# Patient Record
Sex: MALE | Race: WHITE | NOT HISPANIC OR LATINO | URBAN - METROPOLITAN AREA
[De-identification: names, ages, dates, MRNs, and addresses within clinical notes are randomized per-mention and may not be internally consistent; named-entity substitution may affect disease eponyms.]

---

## 2020-04-10 ENCOUNTER — INPATIENT (INPATIENT)
Facility: HOSPITAL | Age: 85
LOS: 9 days | Discharge: NURSING, REHAB OR LONG-TERM CARE | DRG: 871 | End: 2020-04-20
Attending: INTERNAL MEDICINE | Admitting: INTERNAL MEDICINE
Payer: MEDICARE

## 2020-04-10 VITALS
RESPIRATION RATE: 26 BRPM | OXYGEN SATURATION: 97 % | DIASTOLIC BLOOD PRESSURE: 71 MMHG | HEART RATE: 107 BPM | SYSTOLIC BLOOD PRESSURE: 137 MMHG | WEIGHT: 175.05 LBS

## 2020-04-10 DIAGNOSIS — N39.0 URINARY TRACT INFECTION, SITE NOT SPECIFIED: ICD-10-CM

## 2020-04-10 LAB
ALBUMIN SERPL ELPH-MCNC: 3.4 G/DL — LOW (ref 3.5–5)
ALP SERPL-CCNC: 68 U/L — SIGNIFICANT CHANGE UP (ref 40–120)
ALT FLD-CCNC: 18 U/L DA — SIGNIFICANT CHANGE UP (ref 10–60)
ANION GAP SERPL CALC-SCNC: 11 MMOL/L — SIGNIFICANT CHANGE UP (ref 5–17)
APPEARANCE UR: ABNORMAL
APTT BLD: 37.8 SEC — HIGH (ref 27.5–36.3)
AST SERPL-CCNC: 27 U/L — SIGNIFICANT CHANGE UP (ref 10–40)
BACTERIA # UR AUTO: ABNORMAL /HPF
BASE EXCESS BLDV CALC-SCNC: -1.6 MMOL/L — SIGNIFICANT CHANGE UP (ref -2–2)
BASOPHILS # BLD AUTO: 0.02 K/UL — SIGNIFICANT CHANGE UP (ref 0–0.2)
BASOPHILS NFR BLD AUTO: 0.1 % — SIGNIFICANT CHANGE UP (ref 0–2)
BILIRUB SERPL-MCNC: 1.3 MG/DL — HIGH (ref 0.2–1.2)
BILIRUB UR-MCNC: NEGATIVE — SIGNIFICANT CHANGE UP
BUN SERPL-MCNC: 20 MG/DL — HIGH (ref 7–18)
CALCIUM SERPL-MCNC: 9 MG/DL — SIGNIFICANT CHANGE UP (ref 8.4–10.5)
CHLORIDE SERPL-SCNC: 103 MMOL/L — SIGNIFICANT CHANGE UP (ref 96–108)
CO2 SERPL-SCNC: 22 MMOL/L — SIGNIFICANT CHANGE UP (ref 22–31)
COLOR SPEC: YELLOW — SIGNIFICANT CHANGE UP
CREAT SERPL-MCNC: 1.35 MG/DL — HIGH (ref 0.5–1.3)
D DIMER BLD IA.RAPID-MCNC: 1394 NG/ML DDU — HIGH
DIFF PNL FLD: ABNORMAL
EOSINOPHIL # BLD AUTO: 0.01 K/UL — SIGNIFICANT CHANGE UP (ref 0–0.5)
EOSINOPHIL NFR BLD AUTO: 0.1 % — SIGNIFICANT CHANGE UP (ref 0–6)
EPI CELLS # UR: SIGNIFICANT CHANGE UP /HPF
GLUCOSE SERPL-MCNC: 135 MG/DL — HIGH (ref 70–99)
GLUCOSE UR QL: NEGATIVE — SIGNIFICANT CHANGE UP
HCO3 BLDV-SCNC: 22 MMOL/L — SIGNIFICANT CHANGE UP (ref 21–29)
HCT VFR BLD CALC: 39.8 % — SIGNIFICANT CHANGE UP (ref 39–50)
HGB BLD-MCNC: 13.1 G/DL — SIGNIFICANT CHANGE UP (ref 13–17)
HOROWITZ INDEX BLDV+IHG-RTO: 21 — SIGNIFICANT CHANGE UP
IMM GRANULOCYTES NFR BLD AUTO: 0.4 % — SIGNIFICANT CHANGE UP (ref 0–1.5)
INR BLD: 1.4 RATIO — HIGH (ref 0.88–1.16)
KETONES UR-MCNC: ABNORMAL
LACTATE SERPL-SCNC: 1.1 MMOL/L — SIGNIFICANT CHANGE UP (ref 0.7–2)
LDH SERPL L TO P-CCNC: 229 U/L — HIGH (ref 120–225)
LEUKOCYTE ESTERASE UR-ACNC: ABNORMAL
LYMPHOCYTES # BLD AUTO: 0.5 K/UL — LOW (ref 1–3.3)
LYMPHOCYTES # BLD AUTO: 2.7 % — LOW (ref 13–44)
MCHC RBC-ENTMCNC: 28.4 PG — SIGNIFICANT CHANGE UP (ref 27–34)
MCHC RBC-ENTMCNC: 32.9 GM/DL — SIGNIFICANT CHANGE UP (ref 32–36)
MCV RBC AUTO: 86.3 FL — SIGNIFICANT CHANGE UP (ref 80–100)
MONOCYTES # BLD AUTO: 1.17 K/UL — HIGH (ref 0–0.9)
MONOCYTES NFR BLD AUTO: 6.4 % — SIGNIFICANT CHANGE UP (ref 2–14)
NEUTROPHILS # BLD AUTO: 16.5 K/UL — HIGH (ref 1.8–7.4)
NEUTROPHILS NFR BLD AUTO: 90.3 % — HIGH (ref 43–77)
NITRITE UR-MCNC: POSITIVE
NRBC # BLD: 0 /100 WBCS — SIGNIFICANT CHANGE UP (ref 0–0)
NT-PROBNP SERPL-SCNC: 557 PG/ML — HIGH (ref 0–450)
PCO2 BLDV: 36 MMHG — SIGNIFICANT CHANGE UP (ref 35–50)
PH BLDV: 7.4 — SIGNIFICANT CHANGE UP (ref 7.35–7.45)
PH UR: 5 — SIGNIFICANT CHANGE UP (ref 5–8)
PLATELET # BLD AUTO: 200 K/UL — SIGNIFICANT CHANGE UP (ref 150–400)
PO2 BLDV: 31 MMHG — SIGNIFICANT CHANGE UP (ref 25–45)
POTASSIUM SERPL-MCNC: 3.2 MMOL/L — LOW (ref 3.5–5.3)
POTASSIUM SERPL-SCNC: 3.2 MMOL/L — LOW (ref 3.5–5.3)
PROT SERPL-MCNC: 8.1 G/DL — SIGNIFICANT CHANGE UP (ref 6–8.3)
PROT UR-MCNC: 100
PROTHROM AB SERPL-ACNC: 16 SEC — HIGH (ref 10–12.9)
RBC # BLD: 4.61 M/UL — SIGNIFICANT CHANGE UP (ref 4.2–5.8)
RBC # FLD: 14.5 % — SIGNIFICANT CHANGE UP (ref 10.3–14.5)
RBC CASTS # UR COMP ASSIST: ABNORMAL /HPF (ref 0–2)
SAO2 % BLDV: 98 % — HIGH (ref 67–88)
SODIUM SERPL-SCNC: 136 MMOL/L — SIGNIFICANT CHANGE UP (ref 135–145)
SP GR SPEC: 1.01 — SIGNIFICANT CHANGE UP (ref 1.01–1.02)
TROPONIN I SERPL-MCNC: 0.03 NG/ML — SIGNIFICANT CHANGE UP (ref 0–0.04)
UROBILINOGEN FLD QL: 1
WBC # BLD: 18.28 K/UL — HIGH (ref 3.8–10.5)
WBC # FLD AUTO: 18.28 K/UL — HIGH (ref 3.8–10.5)
WBC UR QL: >50 /HPF (ref 0–5)

## 2020-04-10 PROCEDURE — 74177 CT ABD & PELVIS W/CONTRAST: CPT | Mod: 26

## 2020-04-10 PROCEDURE — 99285 EMERGENCY DEPT VISIT HI MDM: CPT | Mod: CS

## 2020-04-10 PROCEDURE — 71045 X-RAY EXAM CHEST 1 VIEW: CPT | Mod: 26

## 2020-04-10 RX ORDER — CEFTRIAXONE 500 MG/1
1000 INJECTION, POWDER, FOR SOLUTION INTRAMUSCULAR; INTRAVENOUS ONCE
Refills: 0 | Status: COMPLETED | OUTPATIENT
Start: 2020-04-10 | End: 2020-04-10

## 2020-04-10 RX ORDER — SODIUM CHLORIDE 9 MG/ML
500 INJECTION INTRAMUSCULAR; INTRAVENOUS; SUBCUTANEOUS ONCE
Refills: 0 | Status: COMPLETED | OUTPATIENT
Start: 2020-04-10 | End: 2020-04-10

## 2020-04-10 RX ADMIN — CEFTRIAXONE 1000 MILLIGRAM(S): 500 INJECTION, POWDER, FOR SOLUTION INTRAMUSCULAR; INTRAVENOUS at 21:58

## 2020-04-10 RX ADMIN — CEFTRIAXONE 100 MILLIGRAM(S): 500 INJECTION, POWDER, FOR SOLUTION INTRAMUSCULAR; INTRAVENOUS at 20:58

## 2020-04-10 RX ADMIN — SODIUM CHLORIDE 1000 MILLILITER(S): 9 INJECTION INTRAMUSCULAR; INTRAVENOUS; SUBCUTANEOUS at 16:58

## 2020-04-10 NOTE — ED ADULT NURSE REASSESSMENT NOTE - NS ED NURSE REASSESS COMMENT FT1
Received pt from [facility]ERA Vincent, pt is observed laying in bed, breathing room air, in no respiratory distress at time of assessment. Pt is &AO x1-2, able to make needs known when asked, denies any distress/discomfort at this time except that he requests for water. Pt not observed ambulating at this time, meds administered and STAT labs sent as ordered. Skin intact, right wrist #[id number] in place.  Admitted to [facility], awaiting bed, endorsed to GUERA Nolasco for holding. No family at bedside at this time.

## 2020-04-10 NOTE — ED ADULT NURSE NOTE - NSIMPLEMENTINTERV_GEN_ALL_ED
Implemented All Universal Safety Interventions:  Hunt to call system. Call bell, personal items and telephone within reach. Instruct patient to call for assistance. Room bathroom lighting operational. Non-slip footwear when patient is off stretcher. Physically safe environment: no spills, clutter or unnecessary equipment. Stretcher in lowest position, wheels locked, appropriate side rails in place.

## 2020-04-10 NOTE — ED PROVIDER NOTE - PHYSICAL EXAMINATION
General: Patient awake alert NAD.   HEENT: normocephalic, atraumatic, EOMI, + dry MM   Cardiac: RRR, S1, S2, no murmur.   LUNGS: + crackles in b/l bases, + diffuse diminished breath sounds, no wheeze, rhonchi, + speaking in short phrases, + tachypnea.     Abdomen: soft + reducible umbilical hernia, +RUQ and periumbilical, no rebound no guarding.   EXT: Moving all extremities, no edema.   Neuro: A&Ox1, no focal neurological deficits, CN 2-12 grossly intact  Skin: warm, dry, no rash.

## 2020-04-10 NOTE — ED PROVIDER NOTE - CLINICAL SUMMARY MEDICAL DECISION MAKING FREE TEXT BOX
Elderly male with unknown PMHx, pw sob congestion and abd pain. Possible COVID PNA or intraabdominal pathology. Low threshold for abd CT given prior surgical history. COVID labs, CXR, CT abd and pelvis. Likely require admission for difficulty breathing.

## 2020-04-10 NOTE — ED PROVIDER NOTE - CARE PLAN
Principal Discharge DX:	Shortness of breath Principal Discharge DX:	UTI (urinary tract infection)  Secondary Diagnosis:	Shortness of breath

## 2020-04-10 NOTE — ED PROVIDER NOTE - ATTENDING CONTRIBUTION TO CARE
Attending MD Mendez.  Agree with above.  Pt sent reportedly for SOB/cough however disoriented to time/place and situation.  Pt is sating 96% ORA in ED.  NRB had been started on arrival or by EMS (unclear) however when O2 removed pt is maintaining sats without assistance.  Pt has diffuse abdominal TTP R>L.  Has umbilical hernia noted but this is reducible and minimally tender, no surrounding discoloration.  Extremities are cool and non-edematous.  Plan to eval for COVID and attempt to get collateral information.  Initially paperwork from whereever he came/EMS not able to be found.  Will continue to check in ED.

## 2020-04-10 NOTE — ED PROVIDER NOTE - OBJECTIVE STATEMENT
85 yo M, AOx2, poor historian, unable to recall his pmhx, meds, or a family member name or phone number, presents with congestion and SOB. Pt was on 15 L NRB in triage 98%, 95% on RA, increased WOB speaking in few words. Mild abd tenderness in RUQ and periumbilical on exam. No prior visits in chart. 87 yo M, AOx2, poor historian, unable to recall his pmhx, meds, or a family member name or phone number, presents with congestion and SOB. Pt was on 15 L NRB in triage 98%, 95% on RA, increased WOB speaking in few words. Mild abd tenderness in RUQ and periumbilical on exam. No prior visits in chart.    PMHX from transfer paperwork: Hypothyroid, BPH, Divertitifulitis, HLD, HTN, Glaucoma, Porastate CA,

## 2020-04-10 NOTE — ED PROVIDER NOTE - PROGRESS NOTE DETAILS
Cammie Salmon M.D. Resident  Pt off NRB, on RA, SPO2 96% with increased WOB Cammie Salmon M.D. Resident  Spoke to  Emmy from [facility] [place] patient was more confused today (usually able to answer questions, and knows what year it is). called health care proxy, will Serrano,  x 3 no answer. Attending MD Mendez.  Pt endorsed to Dr. Aiden Mejia for UTI +/- COVID. Ceftriaxone administered for UTI.  Pt not requiring O2 at this time.  Sating 96-97% ORA without resp distress.  Of note d-dimer obtained as part of COVID screening and was not obtained for concern for PE.  No CTA ordered or planned at this time.

## 2020-04-11 DIAGNOSIS — R06.02 SHORTNESS OF BREATH: ICD-10-CM

## 2020-04-11 DIAGNOSIS — N17.9 ACUTE KIDNEY FAILURE, UNSPECIFIED: ICD-10-CM

## 2020-04-11 DIAGNOSIS — N30.00 ACUTE CYSTITIS WITHOUT HEMATURIA: ICD-10-CM

## 2020-04-11 DIAGNOSIS — E03.9 HYPOTHYROIDISM, UNSPECIFIED: ICD-10-CM

## 2020-04-11 DIAGNOSIS — N40.0 BENIGN PROSTATIC HYPERPLASIA WITHOUT LOWER URINARY TRACT SYMPTOMS: ICD-10-CM

## 2020-04-11 DIAGNOSIS — Z29.9 ENCOUNTER FOR PROPHYLACTIC MEASURES, UNSPECIFIED: ICD-10-CM

## 2020-04-11 LAB
CRP SERPL-MCNC: 6.81 MG/DL — HIGH (ref 0–0.4)
E COLI DNA BLD POS QL NAA+NON-PROBE: SIGNIFICANT CHANGE UP
FERRITIN SERPL-MCNC: 523 NG/ML — HIGH (ref 30–400)
GRAM STN FLD: SIGNIFICANT CHANGE UP
GRAM STN FLD: SIGNIFICANT CHANGE UP
METHOD TYPE: SIGNIFICANT CHANGE UP
SARS-COV-2 RNA SPEC QL NAA+PROBE: DETECTED
SPECIMEN SOURCE: SIGNIFICANT CHANGE UP
SPECIMEN SOURCE: SIGNIFICANT CHANGE UP

## 2020-04-11 RX ORDER — ACETAMINOPHEN 500 MG
1000 TABLET ORAL ONCE
Refills: 0 | Status: DISCONTINUED | OUTPATIENT
Start: 2020-04-11 | End: 2020-04-11

## 2020-04-11 RX ORDER — LEVOTHYROXINE SODIUM 125 MCG
25 TABLET ORAL DAILY
Refills: 0 | Status: DISCONTINUED | OUTPATIENT
Start: 2020-04-11 | End: 2020-04-20

## 2020-04-11 RX ORDER — DEXTROSE MONOHYDRATE, SODIUM CHLORIDE, AND POTASSIUM CHLORIDE 50; .745; 4.5 G/1000ML; G/1000ML; G/1000ML
1000 INJECTION, SOLUTION INTRAVENOUS
Refills: 0 | Status: DISCONTINUED | OUTPATIENT
Start: 2020-04-11 | End: 2020-04-11

## 2020-04-11 RX ORDER — FINASTERIDE 5 MG/1
5 TABLET, FILM COATED ORAL DAILY
Refills: 0 | Status: DISCONTINUED | OUTPATIENT
Start: 2020-04-11 | End: 2020-04-20

## 2020-04-11 RX ORDER — CEFTRIAXONE 500 MG/1
1 INJECTION, POWDER, FOR SOLUTION INTRAMUSCULAR; INTRAVENOUS
Qty: 0 | Refills: 0 | DISCHARGE
Start: 2020-04-11

## 2020-04-11 RX ORDER — CEFTRIAXONE 500 MG/1
1000 INJECTION, POWDER, FOR SOLUTION INTRAMUSCULAR; INTRAVENOUS EVERY 24 HOURS
Refills: 0 | Status: DISCONTINUED | OUTPATIENT
Start: 2020-04-11 | End: 2020-04-12

## 2020-04-11 RX ORDER — POTASSIUM CHLORIDE 20 MEQ
40 PACKET (EA) ORAL ONCE
Refills: 0 | Status: COMPLETED | OUTPATIENT
Start: 2020-04-11 | End: 2020-04-11

## 2020-04-11 RX ORDER — PANTOPRAZOLE SODIUM 20 MG/1
40 TABLET, DELAYED RELEASE ORAL
Refills: 0 | Status: DISCONTINUED | OUTPATIENT
Start: 2020-04-11 | End: 2020-04-20

## 2020-04-11 RX ORDER — TAMSULOSIN HYDROCHLORIDE 0.4 MG/1
0.4 CAPSULE ORAL AT BEDTIME
Refills: 0 | Status: DISCONTINUED | OUTPATIENT
Start: 2020-04-11 | End: 2020-04-20

## 2020-04-11 RX ORDER — ACETAMINOPHEN 500 MG
650 TABLET ORAL EVERY 6 HOURS
Refills: 0 | Status: DISCONTINUED | OUTPATIENT
Start: 2020-04-11 | End: 2020-04-20

## 2020-04-11 RX ORDER — LORATADINE 10 MG/1
10 TABLET ORAL DAILY
Refills: 0 | Status: DISCONTINUED | OUTPATIENT
Start: 2020-04-11 | End: 2020-04-20

## 2020-04-11 RX ORDER — MONTELUKAST 4 MG/1
10 TABLET, CHEWABLE ORAL DAILY
Refills: 0 | Status: DISCONTINUED | OUTPATIENT
Start: 2020-04-11 | End: 2020-04-20

## 2020-04-11 RX ORDER — SIMVASTATIN 20 MG/1
20 TABLET, FILM COATED ORAL AT BEDTIME
Refills: 0 | Status: DISCONTINUED | OUTPATIENT
Start: 2020-04-11 | End: 2020-04-20

## 2020-04-11 RX ORDER — SODIUM CHLORIDE 9 MG/ML
1000 INJECTION INTRAMUSCULAR; INTRAVENOUS; SUBCUTANEOUS
Refills: 0 | Status: DISCONTINUED | OUTPATIENT
Start: 2020-04-11 | End: 2020-04-20

## 2020-04-11 RX ORDER — ENOXAPARIN SODIUM 100 MG/ML
40 INJECTION SUBCUTANEOUS DAILY
Refills: 0 | Status: DISCONTINUED | OUTPATIENT
Start: 2020-04-11 | End: 2020-04-20

## 2020-04-11 RX ADMIN — CEFTRIAXONE 100 MILLIGRAM(S): 500 INJECTION, POWDER, FOR SOLUTION INTRAMUSCULAR; INTRAVENOUS at 12:02

## 2020-04-11 RX ADMIN — DEXTROSE MONOHYDRATE, SODIUM CHLORIDE, AND POTASSIUM CHLORIDE 50 MILLILITER(S): 50; .745; 4.5 INJECTION, SOLUTION INTRAVENOUS at 12:03

## 2020-04-11 RX ADMIN — SODIUM CHLORIDE 75 MILLILITER(S): 9 INJECTION INTRAMUSCULAR; INTRAVENOUS; SUBCUTANEOUS at 15:50

## 2020-04-11 RX ADMIN — SIMVASTATIN 20 MILLIGRAM(S): 20 TABLET, FILM COATED ORAL at 21:19

## 2020-04-11 RX ADMIN — MONTELUKAST 10 MILLIGRAM(S): 4 TABLET, CHEWABLE ORAL at 12:02

## 2020-04-11 RX ADMIN — FINASTERIDE 5 MILLIGRAM(S): 5 TABLET, FILM COATED ORAL at 12:02

## 2020-04-11 RX ADMIN — ENOXAPARIN SODIUM 40 MILLIGRAM(S): 100 INJECTION SUBCUTANEOUS at 12:02

## 2020-04-11 RX ADMIN — Medication 650 MILLIGRAM(S): at 16:21

## 2020-04-11 RX ADMIN — LORATADINE 10 MILLIGRAM(S): 10 TABLET ORAL at 12:02

## 2020-04-11 RX ADMIN — Medication 40 MILLIEQUIVALENT(S): at 12:02

## 2020-04-11 RX ADMIN — TAMSULOSIN HYDROCHLORIDE 0.4 MILLIGRAM(S): 0.4 CAPSULE ORAL at 21:19

## 2020-04-11 NOTE — ACUTE INTERFACILITY TRANSFER NOTE - HOSPITAL COURSE
85 yo M, AOx2, poor historian, unable to recall his pmhx, meds, or a family member name or phone number, presents with congestion and SOB. Pt was on 15 L NRB in triage 98%, 95% on RA, increased WOB speaking in few words. Mild abd tenderness in RUQ and periumbilical on exam. No prior visits in chart.    Urinaylsis shows UTI  on rocephin IV  r/o COVID     blood cultures prelim showed gram variable rods  c/w Rocephin IV for now as per med attding Dr Aiden Mejia

## 2020-04-11 NOTE — H&P ADULT - PROBLEM SELECTOR PLAN 2
was sent from NH due to Shortness of breath  Pt. denies any shortness of breath  - Respiratory status- Not in distress, S02 97% on RA on my evaluation  - T- afebrile  - WBC: 18, lymphopenia 0.5  - CXR - Minimal left midlung reticular opacities secondary to discoid atelectasis or viral pneumonia.  - Ed course; Rocephin, IV NS 500ml.   - Will rule out covid 19 with contact and airborne isolation precaution   - FU CRP, ferritin   - Blood culture , COVID no

## 2020-04-11 NOTE — H&P ADULT - ASSESSMENT
86M from [facility], PMH of BPH, hemorrhoids, HLD, HTN, osteoarthritis, GERD was sent from NH for shortness of breath as per NH paper. Pt. is AAO X1 and thinks he is in [facility]. He states he does not know why he was sent to hospital. Denies any pain, fever, chills, flu like symptoms, urinary or bowel complaints. 86M from [facility], PMH of BPH, hemorrhoids, HLD, Hypothyroid, osteoarthritis, GERD was sent from NH for shortness of breath as per NH paper. Pt. is AAO X1 and thinks he is in [facility]. He states he does not know why he was sent to hospital. Denies any pain, fever, chills, flu like symptoms, urinary or bowel complaints. 86M from [facility], PMH of BPH, hemorrhoids, HLD, Hypothyroid, osteoarthritis, GERD was sent from NH for shortness of breath as per NH paper. Pt. is AAO X1 and thinks he is in [facility]. He states he does not know why he was sent to hospital. Denies any pain, fever, chills, flu like symptoms, urinary or bowel complaints.      GOC: Full code as per NH paper, unable to reach family

## 2020-04-11 NOTE — CHART NOTE - NSCHARTNOTEFT_GEN_A_CORE
medical addendum:    critical value returned  bld cx show gram variable rods    pt on rocephin iv- c/w with this abx as per dr beavers

## 2020-04-11 NOTE — H&P ADULT - HISTORY OF PRESENT ILLNESS
86M from [facility], PMH of BPH, hemorrhoids, HLD, HTN, osteoarthritis, GERD was sent from NH for shortness of breath as per NH paper. Pt. is AAO X1 and thinks he is in [facility]. He states he does not know why he was sent to hospital. Denies any pain, fever, chills, flu like symptoms, urinary or bowel complaints. 86M from [facility], PMH of BPH, hemorrhoids, HLD, Hypothyroid, osteoarthritis, GERD was sent from NH for shortness of breath as per NH paper. Pt. is AAO X1 and thinks he is in [facility]. He states he does not know why he was sent to hospital. Denies any pain, fever, chills, flu like symptoms, urinary or bowel complaints.

## 2020-04-11 NOTE — ACUTE INTERFACILITY TRANSFER NOTE - NS MD INTERFACILITY TRANSFER INST FT
85 yo M, AOx2, poor historian, unable to recall his pmhx, meds, or a family member name or phone number, presents with congestion and SOB. Pt was on 15 L NRB in triage 98%, 95% on RA, increased WOB speaking in few words. Mild abd tenderness in RUQ and periumbilical on exam. No prior visits in chart.    May transfer with 3L oxygen via nc during transport. R/o covid admission   being treated for UTI with rocephin iv

## 2020-04-11 NOTE — ACUTE INTERFACILITY TRANSFER NOTE - OTHER SIGNIFICANT FINDINGS
IMPRESSION:   Severe bladder wall thickening concerning for cystitis. Enlarged prostate compressing bladder base. No hydronephrosis. No renal calculus.  Appendix normal.

## 2020-04-11 NOTE — H&P ADULT - NSHPPHYSICALEXAM_GEN_ALL_CORE
Vital Signs Last 24 Hrs  T(C): 37.4 (11 Apr 2020 08:47), Max: 37.4 (10 Apr 2020 21:46)  T(F): 99.4 (11 Apr 2020 08:47), Max: 99.4 (10 Apr 2020 21:46)  HR: 113 (11 Apr 2020 08:47) (97 - 124)  BP: 165/77 (11 Apr 2020 08:47) (135/81 - 165/77)  BP(mean): --  RR: 22 (11 Apr 2020 08:47) (20 - 26)  SpO2: 97% (11 Apr 2020 13:37) (96% - 100%)    PHYSICAL EXAM:  GENERAL: NAD  HEENT: Normocephalic;  conjunctivae and sclerae clear; moist mucous membranes;   NECK : supple  CHEST/LUNG: Clear to auscultation bilaterally with good air entry   HEART: S1 S2  regular; no murmurs, gallops or rubs  ABDOMEN: Soft, Nontender, Nondistended; Bowel sounds present  EXTREMITIES: no cyanosis; no edema; no calf tenderness  SKIN: warm and dry; no rash  NERVOUS SYSTEM:  Awake and alert; no new deficits

## 2020-04-11 NOTE — ACUTE INTERFACILITY TRANSFER NOTE - CARE PROVIDER_API CALL
Aiden Mejia)  Cardiology  [address] [id number]  Phone: [phone]  Fax: [phone]  Follow Up Time:

## 2020-04-12 LAB
ALBUMIN SERPL ELPH-MCNC: 3 G/DL — LOW (ref 3.5–5)
ALP SERPL-CCNC: 69 U/L — SIGNIFICANT CHANGE UP (ref 40–120)
ALT FLD-CCNC: 34 U/L DA — SIGNIFICANT CHANGE UP (ref 10–60)
ANION GAP SERPL CALC-SCNC: 6 MMOL/L — SIGNIFICANT CHANGE UP (ref 5–17)
AST SERPL-CCNC: 80 U/L — HIGH (ref 10–40)
BASOPHILS # BLD AUTO: 0.02 K/UL — SIGNIFICANT CHANGE UP (ref 0–0.2)
BASOPHILS NFR BLD AUTO: 0.1 % — SIGNIFICANT CHANGE UP (ref 0–2)
BILIRUB SERPL-MCNC: 0.9 MG/DL — SIGNIFICANT CHANGE UP (ref 0.2–1.2)
BUN SERPL-MCNC: 22 MG/DL — HIGH (ref 7–18)
CALCIUM SERPL-MCNC: 8.7 MG/DL — SIGNIFICANT CHANGE UP (ref 8.4–10.5)
CHLORIDE SERPL-SCNC: 104 MMOL/L — SIGNIFICANT CHANGE UP (ref 96–108)
CHOLEST SERPL-MCNC: 90 MG/DL — SIGNIFICANT CHANGE UP (ref 10–199)
CO2 SERPL-SCNC: 24 MMOL/L — SIGNIFICANT CHANGE UP (ref 22–31)
CREAT SERPL-MCNC: 1.2 MG/DL — SIGNIFICANT CHANGE UP (ref 0.5–1.3)
EOSINOPHIL # BLD AUTO: 0 K/UL — SIGNIFICANT CHANGE UP (ref 0–0.5)
EOSINOPHIL NFR BLD AUTO: 0 % — SIGNIFICANT CHANGE UP (ref 0–6)
FERRITIN SERPL-MCNC: 591 NG/ML — HIGH (ref 30–400)
GLUCOSE SERPL-MCNC: 95 MG/DL — SIGNIFICANT CHANGE UP (ref 70–99)
HBA1C BLD-MCNC: 6.2 % — HIGH (ref 4–5.6)
HCT VFR BLD CALC: 40.6 % — SIGNIFICANT CHANGE UP (ref 39–50)
HDLC SERPL-MCNC: 28 MG/DL — LOW
HGB BLD-MCNC: 13 G/DL — SIGNIFICANT CHANGE UP (ref 13–17)
IMM GRANULOCYTES NFR BLD AUTO: 0.7 % — SIGNIFICANT CHANGE UP (ref 0–1.5)
LDH SERPL L TO P-CCNC: 274 U/L — HIGH (ref 120–225)
LIPID PNL WITH DIRECT LDL SERPL: 45 MG/DL — SIGNIFICANT CHANGE UP
LYMPHOCYTES # BLD AUTO: 0.56 K/UL — LOW (ref 1–3.3)
LYMPHOCYTES # BLD AUTO: 4.1 % — LOW (ref 13–44)
MAGNESIUM SERPL-MCNC: 2.1 MG/DL — SIGNIFICANT CHANGE UP (ref 1.6–2.6)
MCHC RBC-ENTMCNC: 28.3 PG — SIGNIFICANT CHANGE UP (ref 27–34)
MCHC RBC-ENTMCNC: 32 GM/DL — SIGNIFICANT CHANGE UP (ref 32–36)
MCV RBC AUTO: 88.3 FL — SIGNIFICANT CHANGE UP (ref 80–100)
MONOCYTES # BLD AUTO: 0.99 K/UL — HIGH (ref 0–0.9)
MONOCYTES NFR BLD AUTO: 7.3 % — SIGNIFICANT CHANGE UP (ref 2–14)
NEUTROPHILS # BLD AUTO: 11.84 K/UL — HIGH (ref 1.8–7.4)
NEUTROPHILS NFR BLD AUTO: 87.8 % — HIGH (ref 43–77)
NRBC # BLD: 0 /100 WBCS — SIGNIFICANT CHANGE UP (ref 0–0)
PHOSPHATE SERPL-MCNC: 2.4 MG/DL — LOW (ref 2.5–4.5)
PLATELET # BLD AUTO: 215 K/UL — SIGNIFICANT CHANGE UP (ref 150–400)
POTASSIUM SERPL-MCNC: 3.8 MMOL/L — SIGNIFICANT CHANGE UP (ref 3.5–5.3)
POTASSIUM SERPL-SCNC: 3.8 MMOL/L — SIGNIFICANT CHANGE UP (ref 3.5–5.3)
PROT SERPL-MCNC: 7.7 G/DL — SIGNIFICANT CHANGE UP (ref 6–8.3)
RBC # BLD: 4.6 M/UL — SIGNIFICANT CHANGE UP (ref 4.2–5.8)
RBC # FLD: 14.7 % — HIGH (ref 10.3–14.5)
SODIUM SERPL-SCNC: 134 MMOL/L — LOW (ref 135–145)
TOTAL CHOLESTEROL/HDL RATIO MEASUREMENT: 3.2 RATIO — LOW (ref 3.4–9.6)
TRIGL SERPL-MCNC: 83 MG/DL — SIGNIFICANT CHANGE UP (ref 10–149)
TSH SERPL-MCNC: 2.44 UU/ML — SIGNIFICANT CHANGE UP (ref 0.34–4.82)
VIT B12 SERPL-MCNC: 319 PG/ML — SIGNIFICANT CHANGE UP (ref 232–1245)
WBC # BLD: 13.51 K/UL — HIGH (ref 3.8–10.5)
WBC # FLD AUTO: 13.51 K/UL — HIGH (ref 3.8–10.5)

## 2020-04-12 RX ORDER — PIPERACILLIN AND TAZOBACTAM 4; .5 G/20ML; G/20ML
3.38 INJECTION, POWDER, LYOPHILIZED, FOR SOLUTION INTRAVENOUS ONCE
Refills: 0 | Status: COMPLETED | OUTPATIENT
Start: 2020-04-12 | End: 2020-04-12

## 2020-04-12 RX ORDER — PIPERACILLIN AND TAZOBACTAM 4; .5 G/20ML; G/20ML
3.38 INJECTION, POWDER, LYOPHILIZED, FOR SOLUTION INTRAVENOUS EVERY 8 HOURS
Refills: 0 | Status: DISCONTINUED | OUTPATIENT
Start: 2020-04-12 | End: 2020-04-20

## 2020-04-12 RX ADMIN — Medication 25 MICROGRAM(S): at 05:29

## 2020-04-12 RX ADMIN — PIPERACILLIN AND TAZOBACTAM 200 GRAM(S): 4; .5 INJECTION, POWDER, LYOPHILIZED, FOR SOLUTION INTRAVENOUS at 11:43

## 2020-04-12 RX ADMIN — TAMSULOSIN HYDROCHLORIDE 0.4 MILLIGRAM(S): 0.4 CAPSULE ORAL at 21:28

## 2020-04-12 RX ADMIN — LORATADINE 10 MILLIGRAM(S): 10 TABLET ORAL at 11:44

## 2020-04-12 RX ADMIN — PANTOPRAZOLE SODIUM 40 MILLIGRAM(S): 20 TABLET, DELAYED RELEASE ORAL at 05:29

## 2020-04-12 RX ADMIN — PIPERACILLIN AND TAZOBACTAM 25 GRAM(S): 4; .5 INJECTION, POWDER, LYOPHILIZED, FOR SOLUTION INTRAVENOUS at 14:00

## 2020-04-12 RX ADMIN — FINASTERIDE 5 MILLIGRAM(S): 5 TABLET, FILM COATED ORAL at 11:44

## 2020-04-12 RX ADMIN — SIMVASTATIN 20 MILLIGRAM(S): 20 TABLET, FILM COATED ORAL at 21:28

## 2020-04-12 RX ADMIN — MONTELUKAST 10 MILLIGRAM(S): 4 TABLET, CHEWABLE ORAL at 11:44

## 2020-04-12 RX ADMIN — ENOXAPARIN SODIUM 40 MILLIGRAM(S): 100 INJECTION SUBCUTANEOUS at 11:44

## 2020-04-12 RX ADMIN — PIPERACILLIN AND TAZOBACTAM 25 GRAM(S): 4; .5 INJECTION, POWDER, LYOPHILIZED, FOR SOLUTION INTRAVENOUS at 21:28

## 2020-04-12 NOTE — PROGRESS NOTE ADULT - SUBJECTIVE AND OBJECTIVE BOX
PRESENTING CC:Fever    SUBJ:       PMH -reviewed admission note, no change since admission  Heart failure: acute [ ] chronic [ ] acute or chronic [ ] diastolic [ ] systolic [ ] combined systolic and diastolic[ ]  RACHAEL: ATN[ ] renal medullary necrosis [ ] CKD I [ ]CKDII [ ]CKD III [ ]CKD IV [ ]CKD V [ ]Other pathological lesions [ ]    MEDICATIONS  (STANDING):  enoxaparin Injectable 40 milliGRAM(s) SubCutaneous daily  finasteride 5 milliGRAM(s) Oral daily  levothyroxine 25 MICROGram(s) Oral daily  loratadine 10 milliGRAM(s) Oral daily  montelukast 10 milliGRAM(s) Oral daily  pantoprazole    Tablet 40 milliGRAM(s) Oral before breakfast  piperacillin/tazobactam IVPB.. 3.375 Gram(s) IV Intermittent every 8 hours  simvastatin 20 milliGRAM(s) Oral at bedtime  sodium chloride 0.9%. 1000 milliLiter(s) (75 mL/Hr) IV Continuous <Continuous>  tamsulosin 0.4 milliGRAM(s) Oral at bedtime    MEDICATIONS  (PRN):  acetaminophen   Tablet .. 650 milliGRAM(s) Oral every 6 hours PRN Temp greater or equal to 38C (100.4F)                  PHYSICAL EXAM:  General: No acute distress BMI-  HEENT: EOMI, PERRL  Neck: Supple, [ ] JVD  Lungs: Equal air entry bilaterally; [ ] rales [ ] wheezing [ x] rhonchi  Heart: Regular rate and rhythm; [x ] murmur   2/6 [x ] systolic [ ] diastolic [ ] radiation[ ] rubs [ ]  gallops  Abdomen: Nontender, bowel sounds present  Extremities: No clubbing, cyanosis, [ ] edema  Nervous system:  Alert & Oriented X3, no focal deficits  Psychiatric: Normal affect  Skin: No rashes or lesions    LABS:  COVID-19 PCR . (04.10.20 @ 16:31)    COVID-19 PCR: Detected: This test has been validated by [employer] to be accurate;  though it has not been FDA cleared/approved by the usual pathway.  As with all laboratory tests, results should be correlated with clinical  findings.  https://www.fda.gov/media/076789/download  https://www.fda.gov/media/768810/download                   IMPRESSION AND PLAN:      Bacteremia 2/2 UTI-GNR  Start Zosyn

## 2020-04-12 NOTE — PROGRESS NOTE ADULT - ATTENDING COMMENTS
Patient was seen and examined by me on 04/12/2020,interim events noted,labs and radiology studies reviewed.  Aiden Mejia MD,FACC.  [address] [place] [address].  [facility].  [phone]

## 2020-04-13 LAB
-  AMIKACIN: SIGNIFICANT CHANGE UP
-  AMIKACIN: SIGNIFICANT CHANGE UP
-  AMPICILLIN/SULBACTAM: SIGNIFICANT CHANGE UP
-  AMPICILLIN/SULBACTAM: SIGNIFICANT CHANGE UP
-  AMPICILLIN: SIGNIFICANT CHANGE UP
-  AMPICILLIN: SIGNIFICANT CHANGE UP
-  AZTREONAM: SIGNIFICANT CHANGE UP
-  AZTREONAM: SIGNIFICANT CHANGE UP
-  CEFAZOLIN: SIGNIFICANT CHANGE UP
-  CEFAZOLIN: SIGNIFICANT CHANGE UP
-  CEFEPIME: SIGNIFICANT CHANGE UP
-  CEFEPIME: SIGNIFICANT CHANGE UP
-  CEFOXITIN: SIGNIFICANT CHANGE UP
-  CEFOXITIN: SIGNIFICANT CHANGE UP
-  CEFTRIAXONE: SIGNIFICANT CHANGE UP
-  CEFTRIAXONE: SIGNIFICANT CHANGE UP
-  CIPROFLOXACIN: SIGNIFICANT CHANGE UP
-  CIPROFLOXACIN: SIGNIFICANT CHANGE UP
-  ERTAPENEM: SIGNIFICANT CHANGE UP
-  ERTAPENEM: SIGNIFICANT CHANGE UP
-  GENTAMICIN: SIGNIFICANT CHANGE UP
-  GENTAMICIN: SIGNIFICANT CHANGE UP
-  IMIPENEM: SIGNIFICANT CHANGE UP
-  IMIPENEM: SIGNIFICANT CHANGE UP
-  LEVOFLOXACIN: SIGNIFICANT CHANGE UP
-  LEVOFLOXACIN: SIGNIFICANT CHANGE UP
-  MEROPENEM: SIGNIFICANT CHANGE UP
-  MEROPENEM: SIGNIFICANT CHANGE UP
-  NITROFURANTOIN: SIGNIFICANT CHANGE UP
-  PIPERACILLIN/TAZOBACTAM: SIGNIFICANT CHANGE UP
-  PIPERACILLIN/TAZOBACTAM: SIGNIFICANT CHANGE UP
-  TIGECYCLINE: SIGNIFICANT CHANGE UP
-  TOBRAMYCIN: SIGNIFICANT CHANGE UP
-  TOBRAMYCIN: SIGNIFICANT CHANGE UP
-  TRIMETHOPRIM/SULFAMETHOXAZOLE: SIGNIFICANT CHANGE UP
-  TRIMETHOPRIM/SULFAMETHOXAZOLE: SIGNIFICANT CHANGE UP
ALBUMIN SERPL ELPH-MCNC: 2.6 G/DL — LOW (ref 3.5–5)
ALP SERPL-CCNC: 60 U/L — SIGNIFICANT CHANGE UP (ref 40–120)
ALT FLD-CCNC: 36 U/L DA — SIGNIFICANT CHANGE UP (ref 10–60)
ANION GAP SERPL CALC-SCNC: 9 MMOL/L — SIGNIFICANT CHANGE UP (ref 5–17)
AST SERPL-CCNC: 57 U/L — HIGH (ref 10–40)
BASOPHILS # BLD AUTO: 0.02 K/UL — SIGNIFICANT CHANGE UP (ref 0–0.2)
BASOPHILS NFR BLD AUTO: 0.2 % — SIGNIFICANT CHANGE UP (ref 0–2)
BILIRUB SERPL-MCNC: 0.8 MG/DL — SIGNIFICANT CHANGE UP (ref 0.2–1.2)
BUN SERPL-MCNC: 21 MG/DL — HIGH (ref 7–18)
CALCIUM SERPL-MCNC: 8.7 MG/DL — SIGNIFICANT CHANGE UP (ref 8.4–10.5)
CHLORIDE SERPL-SCNC: 103 MMOL/L — SIGNIFICANT CHANGE UP (ref 96–108)
CO2 SERPL-SCNC: 22 MMOL/L — SIGNIFICANT CHANGE UP (ref 22–31)
CREAT SERPL-MCNC: 1.12 MG/DL — SIGNIFICANT CHANGE UP (ref 0.5–1.3)
CULTURE RESULTS: SIGNIFICANT CHANGE UP
EOSINOPHIL # BLD AUTO: 0.04 K/UL — SIGNIFICANT CHANGE UP (ref 0–0.5)
EOSINOPHIL NFR BLD AUTO: 0.5 % — SIGNIFICANT CHANGE UP (ref 0–6)
GLUCOSE SERPL-MCNC: 94 MG/DL — SIGNIFICANT CHANGE UP (ref 70–99)
HCT VFR BLD CALC: 37.1 % — LOW (ref 39–50)
HGB BLD-MCNC: 11.8 G/DL — LOW (ref 13–17)
IMM GRANULOCYTES NFR BLD AUTO: 0.9 % — SIGNIFICANT CHANGE UP (ref 0–1.5)
LYMPHOCYTES # BLD AUTO: 0.62 K/UL — LOW (ref 1–3.3)
LYMPHOCYTES # BLD AUTO: 7.1 % — LOW (ref 13–44)
MAGNESIUM SERPL-MCNC: 2.4 MG/DL — SIGNIFICANT CHANGE UP (ref 1.6–2.6)
MCHC RBC-ENTMCNC: 27.9 PG — SIGNIFICANT CHANGE UP (ref 27–34)
MCHC RBC-ENTMCNC: 31.8 GM/DL — LOW (ref 32–36)
MCV RBC AUTO: 87.7 FL — SIGNIFICANT CHANGE UP (ref 80–100)
METHOD TYPE: SIGNIFICANT CHANGE UP
METHOD TYPE: SIGNIFICANT CHANGE UP
MONOCYTES # BLD AUTO: 0.93 K/UL — HIGH (ref 0–0.9)
MONOCYTES NFR BLD AUTO: 10.7 % — SIGNIFICANT CHANGE UP (ref 2–14)
NEUTROPHILS # BLD AUTO: 7.02 K/UL — SIGNIFICANT CHANGE UP (ref 1.8–7.4)
NEUTROPHILS NFR BLD AUTO: 80.6 % — HIGH (ref 43–77)
NRBC # BLD: 0 /100 WBCS — SIGNIFICANT CHANGE UP (ref 0–0)
ORGANISM # SPEC MICROSCOPIC CNT: SIGNIFICANT CHANGE UP
PHOSPHATE SERPL-MCNC: 2.7 MG/DL — SIGNIFICANT CHANGE UP (ref 2.5–4.5)
PLATELET # BLD AUTO: 242 K/UL — SIGNIFICANT CHANGE UP (ref 150–400)
POTASSIUM SERPL-MCNC: 3.7 MMOL/L — SIGNIFICANT CHANGE UP (ref 3.5–5.3)
POTASSIUM SERPL-SCNC: 3.7 MMOL/L — SIGNIFICANT CHANGE UP (ref 3.5–5.3)
PROT SERPL-MCNC: 7 G/DL — SIGNIFICANT CHANGE UP (ref 6–8.3)
RBC # BLD: 4.23 M/UL — SIGNIFICANT CHANGE UP (ref 4.2–5.8)
RBC # FLD: 14.7 % — HIGH (ref 10.3–14.5)
SODIUM SERPL-SCNC: 134 MMOL/L — LOW (ref 135–145)
SPECIMEN SOURCE: SIGNIFICANT CHANGE UP
WBC # BLD: 8.71 K/UL — SIGNIFICANT CHANGE UP (ref 3.8–10.5)
WBC # FLD AUTO: 8.71 K/UL — SIGNIFICANT CHANGE UP (ref 3.8–10.5)

## 2020-04-13 RX ADMIN — PIPERACILLIN AND TAZOBACTAM 25 GRAM(S): 4; .5 INJECTION, POWDER, LYOPHILIZED, FOR SOLUTION INTRAVENOUS at 05:22

## 2020-04-13 RX ADMIN — TAMSULOSIN HYDROCHLORIDE 0.4 MILLIGRAM(S): 0.4 CAPSULE ORAL at 21:50

## 2020-04-13 RX ADMIN — PIPERACILLIN AND TAZOBACTAM 25 GRAM(S): 4; .5 INJECTION, POWDER, LYOPHILIZED, FOR SOLUTION INTRAVENOUS at 13:14

## 2020-04-13 RX ADMIN — PIPERACILLIN AND TAZOBACTAM 25 GRAM(S): 4; .5 INJECTION, POWDER, LYOPHILIZED, FOR SOLUTION INTRAVENOUS at 21:50

## 2020-04-13 RX ADMIN — PANTOPRAZOLE SODIUM 40 MILLIGRAM(S): 20 TABLET, DELAYED RELEASE ORAL at 05:20

## 2020-04-13 RX ADMIN — FINASTERIDE 5 MILLIGRAM(S): 5 TABLET, FILM COATED ORAL at 14:18

## 2020-04-13 RX ADMIN — ENOXAPARIN SODIUM 40 MILLIGRAM(S): 100 INJECTION SUBCUTANEOUS at 13:16

## 2020-04-13 RX ADMIN — SIMVASTATIN 20 MILLIGRAM(S): 20 TABLET, FILM COATED ORAL at 21:49

## 2020-04-13 RX ADMIN — MONTELUKAST 10 MILLIGRAM(S): 4 TABLET, CHEWABLE ORAL at 13:14

## 2020-04-13 RX ADMIN — LORATADINE 10 MILLIGRAM(S): 10 TABLET ORAL at 13:14

## 2020-04-13 RX ADMIN — Medication 25 MICROGRAM(S): at 05:20

## 2020-04-13 NOTE — PROGRESS NOTE ADULT - SUBJECTIVE AND OBJECTIVE BOX
Pt s- new complaints. States respiration is improved but "not back to normal' No c/o pain, no n/v  ICU Vital Signs Last 24 Hrs  T(C): 36.6 (13 Apr 2020 00:56), Max: 36.6 (13 Apr 2020 00:56)  T(F): 97.8 (13 Apr 2020 00:56), Max: 97.8 (13 Apr 2020 00:56)  HR: 98 (13 Apr 2020 00:56) (89 - 98)  BP: 135/65 (13 Apr 2020 00:56) (114/73 - 135/65)  BP(mean): --  ABP: --  ABP(mean): --  RR: 29 (13 Apr 2020 00:56) (18 - 29)  SpO2: 99% NC. 3L  Alert nad.  No WRR  No calf tenderness or erythema                          11.8   8.71  )-----------( 242      ( 13 Apr 2020 06:54 )             37.1     04-13    134<L>  |  103  |  21<H>  ----------------------------<  94  3.7   |  22  |  1.12    Ca    8.7      13 Apr 2020 06:54  Phos  2.7     04-13  Mg     2.4     04-13    TPro  7.0  /  Alb  2.6<L>  /  TBili  0.8  /  DBili  x   /  AST  57<H>  /  ALT  36  /  AlkPhos  60  04-13

## 2020-04-13 NOTE — PROGRESS NOTE ADULT - ASSESSMENT
covid pneumonitis  uti/cystitis  leukocytosis resolving on abx  Blood cultures positive    continue flomax  Abx.  trend labs  adjust O2 via nc.  observation

## 2020-04-14 LAB
ALBUMIN SERPL ELPH-MCNC: 2.5 G/DL — LOW (ref 3.5–5)
ALP SERPL-CCNC: 59 U/L — SIGNIFICANT CHANGE UP (ref 40–120)
ALT FLD-CCNC: 38 U/L DA — SIGNIFICANT CHANGE UP (ref 10–60)
ANION GAP SERPL CALC-SCNC: 8 MMOL/L — SIGNIFICANT CHANGE UP (ref 5–17)
AST SERPL-CCNC: 62 U/L — HIGH (ref 10–40)
BILIRUB SERPL-MCNC: 0.9 MG/DL — SIGNIFICANT CHANGE UP (ref 0.2–1.2)
BUN SERPL-MCNC: 17 MG/DL — SIGNIFICANT CHANGE UP (ref 7–18)
CALCIUM SERPL-MCNC: 8.8 MG/DL — SIGNIFICANT CHANGE UP (ref 8.4–10.5)
CHLORIDE SERPL-SCNC: 102 MMOL/L — SIGNIFICANT CHANGE UP (ref 96–108)
CO2 SERPL-SCNC: 26 MMOL/L — SIGNIFICANT CHANGE UP (ref 22–31)
CREAT SERPL-MCNC: 1.05 MG/DL — SIGNIFICANT CHANGE UP (ref 0.5–1.3)
GLUCOSE SERPL-MCNC: 90 MG/DL — SIGNIFICANT CHANGE UP (ref 70–99)
HCT VFR BLD CALC: 41.5 % — SIGNIFICANT CHANGE UP (ref 39–50)
HGB BLD-MCNC: 13 G/DL — SIGNIFICANT CHANGE UP (ref 13–17)
MCHC RBC-ENTMCNC: 27.8 PG — SIGNIFICANT CHANGE UP (ref 27–34)
MCHC RBC-ENTMCNC: 31.3 GM/DL — LOW (ref 32–36)
MCV RBC AUTO: 88.7 FL — SIGNIFICANT CHANGE UP (ref 80–100)
NRBC # BLD: 0 /100 WBCS — SIGNIFICANT CHANGE UP (ref 0–0)
PLATELET # BLD AUTO: 276 K/UL — SIGNIFICANT CHANGE UP (ref 150–400)
POTASSIUM SERPL-MCNC: 4.3 MMOL/L — SIGNIFICANT CHANGE UP (ref 3.5–5.3)
POTASSIUM SERPL-SCNC: 4.3 MMOL/L — SIGNIFICANT CHANGE UP (ref 3.5–5.3)
PROT SERPL-MCNC: 7.4 G/DL — SIGNIFICANT CHANGE UP (ref 6–8.3)
RBC # BLD: 4.68 M/UL — SIGNIFICANT CHANGE UP (ref 4.2–5.8)
RBC # FLD: 14.6 % — HIGH (ref 10.3–14.5)
SODIUM SERPL-SCNC: 136 MMOL/L — SIGNIFICANT CHANGE UP (ref 135–145)
WBC # BLD: 5.65 K/UL — SIGNIFICANT CHANGE UP (ref 3.8–10.5)
WBC # FLD AUTO: 5.65 K/UL — SIGNIFICANT CHANGE UP (ref 3.8–10.5)

## 2020-04-14 RX ADMIN — TAMSULOSIN HYDROCHLORIDE 0.4 MILLIGRAM(S): 0.4 CAPSULE ORAL at 21:28

## 2020-04-14 RX ADMIN — MONTELUKAST 10 MILLIGRAM(S): 4 TABLET, CHEWABLE ORAL at 12:55

## 2020-04-14 RX ADMIN — SIMVASTATIN 20 MILLIGRAM(S): 20 TABLET, FILM COATED ORAL at 21:28

## 2020-04-14 RX ADMIN — PIPERACILLIN AND TAZOBACTAM 25 GRAM(S): 4; .5 INJECTION, POWDER, LYOPHILIZED, FOR SOLUTION INTRAVENOUS at 21:28

## 2020-04-14 RX ADMIN — PIPERACILLIN AND TAZOBACTAM 25 GRAM(S): 4; .5 INJECTION, POWDER, LYOPHILIZED, FOR SOLUTION INTRAVENOUS at 12:57

## 2020-04-14 RX ADMIN — ENOXAPARIN SODIUM 40 MILLIGRAM(S): 100 INJECTION SUBCUTANEOUS at 15:12

## 2020-04-14 RX ADMIN — Medication 25 MICROGRAM(S): at 06:07

## 2020-04-14 RX ADMIN — LORATADINE 10 MILLIGRAM(S): 10 TABLET ORAL at 12:55

## 2020-04-14 RX ADMIN — PIPERACILLIN AND TAZOBACTAM 25 GRAM(S): 4; .5 INJECTION, POWDER, LYOPHILIZED, FOR SOLUTION INTRAVENOUS at 06:06

## 2020-04-14 RX ADMIN — PANTOPRAZOLE SODIUM 40 MILLIGRAM(S): 20 TABLET, DELAYED RELEASE ORAL at 06:07

## 2020-04-14 RX ADMIN — FINASTERIDE 5 MILLIGRAM(S): 5 TABLET, FILM COATED ORAL at 12:55

## 2020-04-14 NOTE — PROGRESS NOTE ADULT - ASSESSMENT
86M from [facility], PMH of BPH, hemorrhoids, HLD, Hypothyroid, osteoarthritis, GERD was sent from NH for shortness of breath as per NH paper. Pt. is AAO X1 and thinks he is in [facility]. He states he does not know why he was sent to hospital. Denies any pain, fever, chills, flu like symptoms, urinary or bowel complaints.      GOC: Full code as per NH paper, unable to reach family

## 2020-04-14 NOTE — PROGRESS NOTE ADULT - SUBJECTIVE AND OBJECTIVE BOX
PGY 1 Note discussed with supervising resident and primary attending    Patient is a 86y old  Male who presents with a chief complaint of shortness of breath (13 Apr 2020 08:17)      INTERVAL HPI/OVERNIGHT EVENTS: offers no new complaints; current symptoms resolving    MEDICATIONS  (STANDING):  enoxaparin Injectable 40 milliGRAM(s) SubCutaneous daily  finasteride 5 milliGRAM(s) Oral daily  levothyroxine 25 MICROGram(s) Oral daily  loratadine 10 milliGRAM(s) Oral daily  montelukast 10 milliGRAM(s) Oral daily  pantoprazole    Tablet 40 milliGRAM(s) Oral before breakfast  piperacillin/tazobactam IVPB.. 3.375 Gram(s) IV Intermittent every 8 hours  simvastatin 20 milliGRAM(s) Oral at bedtime  sodium chloride 0.9%. 1000 milliLiter(s) (75 mL/Hr) IV Continuous <Continuous>  tamsulosin 0.4 milliGRAM(s) Oral at bedtime    MEDICATIONS  (PRN):  acetaminophen   Tablet .. 650 milliGRAM(s) Oral every 6 hours PRN Temp greater or equal to 38C (100.4F)      __________________________________________________  REVIEW OF SYSTEMS:    CONSTITUTIONAL: No fever,   EYES: no acute visual disturbances  NECK: No pain or stiffness  RESPIRATORY: No cough; No shortness of breath  CARDIOVASCULAR: No chest pain, no palpitations  GASTROINTESTINAL: No pain. No nausea or vomiting; No diarrhea   NEUROLOGICAL: No headache or numbness, no tremors  MUSCULOSKELETAL: No joint pain, no muscle pain  GENITOURINARY: no dysuria, no frequency, no hesitancy  PSYCHIATRY: no depression , no anxiety  ALL OTHER  ROS negative        Vital Signs Last 24 Hrs  T(C): 36.3 (13 Apr 2020 23:48), Max: 36.6 (13 Apr 2020 16:08)  T(F): 97.3 (13 Apr 2020 23:48), Max: 97.8 (13 Apr 2020 16:08)  HR: 98 (13 Apr 2020 23:48) (90 - 98)  BP: 104/65 (13 Apr 2020 23:48) (104/65 - 130/77)  BP(mean): --  RR: 22 (13 Apr 2020 23:48) (20 - 22)  SpO2: 95% (13 Apr 2020 23:48) (95% - 97%)    ________________________________________________  PHYSICAL EXAM:  GENERAL: NAD  HEENT: Normocephalic;  conjunctivae and sclerae clear; moist mucous membranes;   NECK : supple  CHEST/LUNG: Clear to auscultation bilaterally with good air entry   HEART: S1 S2  regular; no murmurs, gallops or rubs  ABDOMEN: Soft, Nontender, Nondistended; Bowel sounds present  EXTREMITIES: no cyanosis; no edema; no calf tenderness  SKIN: warm and dry; no rash  NERVOUS SYSTEM:  Awake and alert; Oriented  to place, person and time ; no new deficits    _________________________________________________  LABS:                        13.0   5.65  )-----------( 276      ( 14 Apr 2020 06:17 )             41.5     04-14    136  |  102  |  17  ----------------------------<  90  4.3   |  26  |  1.05    Ca    8.8      14 Apr 2020 06:17  Phos  2.7     04-13  Mg     2.4     04-13    TPro  7.4  /  Alb  2.5<L>  /  TBili  0.9  /  DBili  x   /  AST  62<H>  /  ALT  38  /  AlkPhos  59  04-14        CAPILLARY BLOOD GLUCOSE        < from: Xray Chest 1 View-PORTABLE IMMEDIATE (04.10.20 @ 16:42) >    EXAM:  XR CHEST PORTABLE IMMED 1V                            PROCEDURE DATE:  04/10/2020          INTERPRETATION:  CLINICAL INDICATION: 86 years  Male with Shortness of Breath, Cough,  Fever.    COMPARISON: None    AP view of the chest demonstratesminimal left midlung reticular opacities. There is no pleural effusion. There is no pneumothorax.    The heart is normal in size. The aorta is atherosclerotic. There is no mediastinal or hilar mass.     The pulmonary vasculature is normal.     Mild thoracic degenerative changes are present.    IMPRESSION:    Minimal left midlung reticular opacities secondary to discoid atelectasis or viral pneumonia.                JANETTE NUGENT M.D., ATTENDING RADIOLOGIST  This document has been electronically signed. Apr 10 2020  4:09PM        < end of copied text >      RADIOLOGY & ADDITIONAL TESTS:    Imaging Personally Reviewed:  YES    Consultant(s) Notes Reviewed:   YES    Care Discussed with Consultants :     Plan of care was discussed with patient and /or primary care giver; all questions and concerns were addressed and care was aligned with patient's wishes.

## 2020-04-14 NOTE — PROGRESS NOTE ADULT - PROBLEM SELECTOR PLAN 2
was sent from NH due to Shortness of breath  Pt. denies any shortness of breath  - Respiratory status- 95% on 3L NC  - T- afebrile  - WBC: trending down , lymphopenia 0.5  - CXR - Minimal left midlung reticular opacities secondary to discoid atelectasis or viral pneumonia.   -covid 19 with contact and airborne isolation precaution   - Blood culture + e.coli; continue Zosyn

## 2020-04-15 LAB
ALBUMIN SERPL ELPH-MCNC: 2.7 G/DL — LOW (ref 3.5–5)
ALP SERPL-CCNC: 64 U/L — SIGNIFICANT CHANGE UP (ref 40–120)
ALT FLD-CCNC: 35 U/L DA — SIGNIFICANT CHANGE UP (ref 10–60)
ANION GAP SERPL CALC-SCNC: 8 MMOL/L — SIGNIFICANT CHANGE UP (ref 5–17)
AST SERPL-CCNC: 44 U/L — HIGH (ref 10–40)
BILIRUB SERPL-MCNC: 0.7 MG/DL — SIGNIFICANT CHANGE UP (ref 0.2–1.2)
BUN SERPL-MCNC: 13 MG/DL — SIGNIFICANT CHANGE UP (ref 7–18)
CALCIUM SERPL-MCNC: 9.2 MG/DL — SIGNIFICANT CHANGE UP (ref 8.4–10.5)
CHLORIDE SERPL-SCNC: 103 MMOL/L — SIGNIFICANT CHANGE UP (ref 96–108)
CO2 SERPL-SCNC: 28 MMOL/L — SIGNIFICANT CHANGE UP (ref 22–31)
CREAT SERPL-MCNC: 0.94 MG/DL — SIGNIFICANT CHANGE UP (ref 0.5–1.3)
GLUCOSE SERPL-MCNC: 92 MG/DL — SIGNIFICANT CHANGE UP (ref 70–99)
HCT VFR BLD CALC: 38.4 % — LOW (ref 39–50)
HGB BLD-MCNC: 12.2 G/DL — LOW (ref 13–17)
MCHC RBC-ENTMCNC: 28 PG — SIGNIFICANT CHANGE UP (ref 27–34)
MCHC RBC-ENTMCNC: 31.8 GM/DL — LOW (ref 32–36)
MCV RBC AUTO: 88.1 FL — SIGNIFICANT CHANGE UP (ref 80–100)
NRBC # BLD: 0 /100 WBCS — SIGNIFICANT CHANGE UP (ref 0–0)
PLATELET # BLD AUTO: 329 K/UL — SIGNIFICANT CHANGE UP (ref 150–400)
POTASSIUM SERPL-MCNC: 3.4 MMOL/L — LOW (ref 3.5–5.3)
POTASSIUM SERPL-SCNC: 3.4 MMOL/L — LOW (ref 3.5–5.3)
PROT SERPL-MCNC: 7.4 G/DL — SIGNIFICANT CHANGE UP (ref 6–8.3)
RBC # BLD: 4.36 M/UL — SIGNIFICANT CHANGE UP (ref 4.2–5.8)
RBC # FLD: 14.4 % — SIGNIFICANT CHANGE UP (ref 10.3–14.5)
SODIUM SERPL-SCNC: 139 MMOL/L — SIGNIFICANT CHANGE UP (ref 135–145)
WBC # BLD: 6.64 K/UL — SIGNIFICANT CHANGE UP (ref 3.8–10.5)
WBC # FLD AUTO: 6.64 K/UL — SIGNIFICANT CHANGE UP (ref 3.8–10.5)

## 2020-04-15 RX ORDER — ANAKINRA 100MG/0.67
100 SYRINGE (ML) SUBCUTANEOUS EVERY 12 HOURS
Refills: 0 | Status: DISCONTINUED | OUTPATIENT
Start: 2020-04-18 | End: 2020-04-20

## 2020-04-15 RX ORDER — ANAKINRA 100MG/0.67
SYRINGE (ML) SUBCUTANEOUS
Refills: 0 | Status: DISCONTINUED | OUTPATIENT
Start: 2020-04-15 | End: 2020-04-20

## 2020-04-15 RX ORDER — ANAKINRA 100MG/0.67
100 SYRINGE (ML) SUBCUTANEOUS EVERY 6 HOURS
Refills: 0 | Status: COMPLETED | OUTPATIENT
Start: 2020-04-15 | End: 2020-04-18

## 2020-04-15 RX ORDER — ANAKINRA 100MG/0.67
100 SYRINGE (ML) SUBCUTANEOUS EVERY 24 HOURS
Refills: 0 | Status: CANCELLED | OUTPATIENT
Start: 2020-04-22 | End: 2020-04-20

## 2020-04-15 RX ADMIN — PIPERACILLIN AND TAZOBACTAM 25 GRAM(S): 4; .5 INJECTION, POWDER, LYOPHILIZED, FOR SOLUTION INTRAVENOUS at 05:33

## 2020-04-15 RX ADMIN — PIPERACILLIN AND TAZOBACTAM 25 GRAM(S): 4; .5 INJECTION, POWDER, LYOPHILIZED, FOR SOLUTION INTRAVENOUS at 21:24

## 2020-04-15 RX ADMIN — TAMSULOSIN HYDROCHLORIDE 0.4 MILLIGRAM(S): 0.4 CAPSULE ORAL at 21:24

## 2020-04-15 RX ADMIN — SIMVASTATIN 20 MILLIGRAM(S): 20 TABLET, FILM COATED ORAL at 21:24

## 2020-04-15 RX ADMIN — Medication 100 MILLIGRAM(S): at 18:04

## 2020-04-15 RX ADMIN — Medication 40 MILLIGRAM(S): at 18:04

## 2020-04-15 RX ADMIN — ENOXAPARIN SODIUM 40 MILLIGRAM(S): 100 INJECTION SUBCUTANEOUS at 12:41

## 2020-04-15 RX ADMIN — MONTELUKAST 10 MILLIGRAM(S): 4 TABLET, CHEWABLE ORAL at 12:41

## 2020-04-15 RX ADMIN — PIPERACILLIN AND TAZOBACTAM 25 GRAM(S): 4; .5 INJECTION, POWDER, LYOPHILIZED, FOR SOLUTION INTRAVENOUS at 12:40

## 2020-04-15 RX ADMIN — LORATADINE 10 MILLIGRAM(S): 10 TABLET ORAL at 12:41

## 2020-04-15 RX ADMIN — Medication 100 MILLIGRAM(S): at 12:41

## 2020-04-15 RX ADMIN — FINASTERIDE 5 MILLIGRAM(S): 5 TABLET, FILM COATED ORAL at 12:41

## 2020-04-15 RX ADMIN — Medication 25 MICROGRAM(S): at 05:33

## 2020-04-15 RX ADMIN — PANTOPRAZOLE SODIUM 40 MILLIGRAM(S): 20 TABLET, DELAYED RELEASE ORAL at 05:33

## 2020-04-15 NOTE — PROGRESS NOTE ADULT - PROBLEM SELECTOR PLAN 2
was sent from NH due to Shortness of breath  Pt. denies any shortness of breath  - Respiratory status- 100% on 3L NC  - T- afebrile  - WBC: trending down   - CXR - Minimal left midlung reticular opacities secondary to discoid atelectasis or viral pneumonia.   -covid 19 with contact and airborne isolation precaution   - Blood culture +esbl e.coli; continue Zosyn  f/u repeat blood cx was sent from NH due to Shortness of breath  Pt. denies any shortness of breath  - Respiratory status- 100% on 3L NC, wean to 1L today  - T- afebrile  - WBC: trending down   - CXR - Minimal left midlung reticular opacities secondary to discoid atelectasis or viral pneumonia.   -covid 19 with contact and airborne isolation precaution   - Blood culture +esbl e.coli; continue Zosyn  f/u repeat blood cx  -start Anakinra and solumedrol

## 2020-04-15 NOTE — PROGRESS NOTE ADULT - ASSESSMENT
86M from [facility], PMH of BPH, hemorrhoids, HLD, Hypothyroid, osteoarthritis, GERD was sent from NH for shortness of breath as per NH paper,Covid+, hypokalemia, ESBL ecoli blood cultures      GOC: Full code as per NH paper, unable to reach family

## 2020-04-15 NOTE — PROGRESS NOTE ADULT - SUBJECTIVE AND OBJECTIVE BOX
seen at bedside, NAD,   offers no new complaints; on 3L nasal canula 100% o2 sat    MEDICATIONS  (STANDING):  enoxaparin Injectable 40 milliGRAM(s) SubCutaneous daily  finasteride 5 milliGRAM(s) Oral daily  levothyroxine 25 MICROGram(s) Oral daily  loratadine 10 milliGRAM(s) Oral daily  montelukast 10 milliGRAM(s) Oral daily  pantoprazole    Tablet 40 milliGRAM(s) Oral before breakfast  piperacillin/tazobactam IVPB.. 3.375 Gram(s) IV Intermittent every 8 hours  simvastatin 20 milliGRAM(s) Oral at bedtime  sodium chloride 0.9%. 1000 milliLiter(s) (75 mL/Hr) IV Continuous <Continuous>  tamsulosin 0.4 milliGRAM(s) Oral at bedtime    MEDICATIONS  (PRN):  acetaminophen   Tablet .. 650 milliGRAM(s) Oral every 6 hours PRN Temp greater or equal to 38C (100.4F)      __________________________________________________  REVIEW OF SYSTEMS:    CONSTITUTIONAL: No fever,   EYES: no acute visual disturbances  NECK: No pain or stiffness  RESPIRATORY: No cough; No shortness of breath  CARDIOVASCULAR: No chest pain, no palpitations  GASTROINTESTINAL: No pain. No nausea or vomiting; No diarrhea   NEUROLOGICAL: No headache or numbness, no tremors  MUSCULOSKELETAL: No joint pain, no muscle pain  GENITOURINARY: no dysuria, no frequency, no hesitancy  PSYCHIATRY: no depression , no anxiety  ALL OTHER  ROS negative        Vital Signs Last 24 Hrs  T(C): 36.5 (15 Apr 2020 08:14), Max: 36.5 (14 Apr 2020 16:15)  T(F): 97.7 (15 Apr 2020 08:14), Max: 97.7 (14 Apr 2020 16:15)  HR: 85 (15 Apr 2020 08:14) (79 - 94)  BP: 139/65 (15 Apr 2020 08:14) (126/65 - 158/73)  BP(mean): --  RR: 22 (15 Apr 2020 08:14) (20 - 22)  SpO2: 100% (15 Apr 2020 08:14) (97% - 100%)    ________________________________________________  PHYSICAL EXAM:  GENERAL: NAD  HEENT: Normocephalic;  conjunctivae and sclerae clear; moist mucous membranes;   NECK : supple  CHEST/LUNG: Clear to auscultation bilaterally with good air entry   HEART: S1 S2  regular; no murmurs, gallops or rubs  ABDOMEN: Soft, Nontender, Nondistended; Bowel sounds present  EXTREMITIES: no cyanosis; no edema; no calf tenderness  SKIN: warm and dry; no rash  NERVOUS SYSTEM:  Awake and alert; Oriented  to place, person and time ; no new deficits    _________________________________________________  LABS:                                     12.2   6.64  )-----------( 329      ( 15 Apr 2020 06:50 )             38.4     04-15    139  |  103  |  13  ----------------------------<  92  3.4<L>   |  28  |  0.94    Ca    9.2      15 Apr 2020 06:50    TPro  7.4  /  Alb  2.7<L>  /  TBili  0.7  /  DBili  x   /  AST  44<H>  /  ALT  35  /  AlkPhos  64  04-15

## 2020-04-16 LAB
ALBUMIN SERPL ELPH-MCNC: 2.8 G/DL — LOW (ref 3.5–5)
ALP SERPL-CCNC: 68 U/L — SIGNIFICANT CHANGE UP (ref 40–120)
ALT FLD-CCNC: 44 U/L DA — SIGNIFICANT CHANGE UP (ref 10–60)
ANION GAP SERPL CALC-SCNC: 11 MMOL/L — SIGNIFICANT CHANGE UP (ref 5–17)
AST SERPL-CCNC: 48 U/L — HIGH (ref 10–40)
BASOPHILS # BLD AUTO: 0.02 K/UL — SIGNIFICANT CHANGE UP (ref 0–0.2)
BASOPHILS NFR BLD AUTO: 0.4 % — SIGNIFICANT CHANGE UP (ref 0–2)
BILIRUB SERPL-MCNC: 0.6 MG/DL — SIGNIFICANT CHANGE UP (ref 0.2–1.2)
BUN SERPL-MCNC: 12 MG/DL — SIGNIFICANT CHANGE UP (ref 7–18)
CALCIUM SERPL-MCNC: 9.2 MG/DL — SIGNIFICANT CHANGE UP (ref 8.4–10.5)
CHLORIDE SERPL-SCNC: 105 MMOL/L — SIGNIFICANT CHANGE UP (ref 96–108)
CO2 SERPL-SCNC: 25 MMOL/L — SIGNIFICANT CHANGE UP (ref 22–31)
CREAT SERPL-MCNC: 0.92 MG/DL — SIGNIFICANT CHANGE UP (ref 0.5–1.3)
CRP SERPL-MCNC: 2.31 MG/DL — HIGH (ref 0–0.4)
D DIMER BLD IA.RAPID-MCNC: 944 NG/ML DDU — HIGH
EOSINOPHIL # BLD AUTO: 0 K/UL — SIGNIFICANT CHANGE UP (ref 0–0.5)
EOSINOPHIL NFR BLD AUTO: 0 % — SIGNIFICANT CHANGE UP (ref 0–6)
FERRITIN SERPL-MCNC: 759 NG/ML — HIGH (ref 30–400)
GLUCOSE SERPL-MCNC: 121 MG/DL — HIGH (ref 70–99)
HCT VFR BLD CALC: 42.7 % — SIGNIFICANT CHANGE UP (ref 39–50)
HGB BLD-MCNC: 13.6 G/DL — SIGNIFICANT CHANGE UP (ref 13–17)
IMM GRANULOCYTES NFR BLD AUTO: 1.6 % — HIGH (ref 0–1.5)
LYMPHOCYTES # BLD AUTO: 0.61 K/UL — LOW (ref 1–3.3)
LYMPHOCYTES # BLD AUTO: 12.5 % — LOW (ref 13–44)
MAGNESIUM SERPL-MCNC: 2.6 MG/DL — SIGNIFICANT CHANGE UP (ref 1.6–2.6)
MCHC RBC-ENTMCNC: 27.8 PG — SIGNIFICANT CHANGE UP (ref 27–34)
MCHC RBC-ENTMCNC: 31.9 GM/DL — LOW (ref 32–36)
MCV RBC AUTO: 87.1 FL — SIGNIFICANT CHANGE UP (ref 80–100)
MONOCYTES # BLD AUTO: 0.16 K/UL — SIGNIFICANT CHANGE UP (ref 0–0.9)
MONOCYTES NFR BLD AUTO: 3.3 % — SIGNIFICANT CHANGE UP (ref 2–14)
NEUTROPHILS # BLD AUTO: 4 K/UL — SIGNIFICANT CHANGE UP (ref 1.8–7.4)
NEUTROPHILS NFR BLD AUTO: 82.2 % — HIGH (ref 43–77)
NRBC # BLD: 0 /100 WBCS — SIGNIFICANT CHANGE UP (ref 0–0)
PHOSPHATE SERPL-MCNC: 3 MG/DL — SIGNIFICANT CHANGE UP (ref 2.5–4.5)
PLATELET # BLD AUTO: 348 K/UL — SIGNIFICANT CHANGE UP (ref 150–400)
POTASSIUM SERPL-MCNC: 3.6 MMOL/L — SIGNIFICANT CHANGE UP (ref 3.5–5.3)
POTASSIUM SERPL-SCNC: 3.6 MMOL/L — SIGNIFICANT CHANGE UP (ref 3.5–5.3)
PROT SERPL-MCNC: 7.9 G/DL — SIGNIFICANT CHANGE UP (ref 6–8.3)
RBC # BLD: 4.9 M/UL — SIGNIFICANT CHANGE UP (ref 4.2–5.8)
RBC # FLD: 13.9 % — SIGNIFICANT CHANGE UP (ref 10.3–14.5)
SODIUM SERPL-SCNC: 141 MMOL/L — SIGNIFICANT CHANGE UP (ref 135–145)
WBC # BLD: 4.87 K/UL — SIGNIFICANT CHANGE UP (ref 3.8–10.5)
WBC # FLD AUTO: 4.87 K/UL — SIGNIFICANT CHANGE UP (ref 3.8–10.5)

## 2020-04-16 RX ADMIN — Medication 40 MILLIGRAM(S): at 05:45

## 2020-04-16 RX ADMIN — ENOXAPARIN SODIUM 40 MILLIGRAM(S): 100 INJECTION SUBCUTANEOUS at 12:16

## 2020-04-16 RX ADMIN — MONTELUKAST 10 MILLIGRAM(S): 4 TABLET, CHEWABLE ORAL at 12:16

## 2020-04-16 RX ADMIN — PIPERACILLIN AND TAZOBACTAM 25 GRAM(S): 4; .5 INJECTION, POWDER, LYOPHILIZED, FOR SOLUTION INTRAVENOUS at 13:49

## 2020-04-16 RX ADMIN — Medication 100 MILLIGRAM(S): at 07:19

## 2020-04-16 RX ADMIN — Medication 100 MILLIGRAM(S): at 12:23

## 2020-04-16 RX ADMIN — PIPERACILLIN AND TAZOBACTAM 25 GRAM(S): 4; .5 INJECTION, POWDER, LYOPHILIZED, FOR SOLUTION INTRAVENOUS at 21:42

## 2020-04-16 RX ADMIN — SIMVASTATIN 20 MILLIGRAM(S): 20 TABLET, FILM COATED ORAL at 21:42

## 2020-04-16 RX ADMIN — LORATADINE 10 MILLIGRAM(S): 10 TABLET ORAL at 12:16

## 2020-04-16 RX ADMIN — TAMSULOSIN HYDROCHLORIDE 0.4 MILLIGRAM(S): 0.4 CAPSULE ORAL at 21:42

## 2020-04-16 RX ADMIN — Medication 100 MILLIGRAM(S): at 17:05

## 2020-04-16 RX ADMIN — Medication 40 MILLIGRAM(S): at 17:00

## 2020-04-16 RX ADMIN — FINASTERIDE 5 MILLIGRAM(S): 5 TABLET, FILM COATED ORAL at 12:16

## 2020-04-16 RX ADMIN — PIPERACILLIN AND TAZOBACTAM 25 GRAM(S): 4; .5 INJECTION, POWDER, LYOPHILIZED, FOR SOLUTION INTRAVENOUS at 05:45

## 2020-04-16 RX ADMIN — Medication 25 MICROGRAM(S): at 05:46

## 2020-04-16 RX ADMIN — Medication 100 MILLIGRAM(S): at 02:38

## 2020-04-16 RX ADMIN — PANTOPRAZOLE SODIUM 40 MILLIGRAM(S): 20 TABLET, DELAYED RELEASE ORAL at 05:46

## 2020-04-16 NOTE — PROGRESS NOTE ADULT - SUBJECTIVE AND OBJECTIVE BOX
CRISTY MAGAÑA  86y  [id number]    Patient is a 86y old  Male who presents with a chief complaint of shortness of breath (15 Apr 2020 11:04)    HPI:  86M from [facility], [facility] of BPH, hemorrhoids, HLD, Hypothyroid, osteoarthritis, GERD was sent from NH for shortness of breath as per NH paper. Pt. is AAO X1 and thinks he is in [facility]. He states he does not know why he was sent to hospital. Denies any pain, fever, chills, flu like symptoms, urinary or bowel complaints. (11 Apr 2020 09:30)   (on admission)    LOS day#6  Events last 24 hours: 85yo, full code, patient +covid19, currently on zosyn for esbl + UTI  today patient on day 5/10 of Zosyn IV  to be discharged on [facility] upon completion of abx, likely monday  pt is confused, unable to obtain ros      PAST MEDICAL & SURGICAL HISTORY:  HLD (hyperlipidemia)  HTN (hypertension)  Hypothyroidism  No significant past surgical history        ROS:  EYES: No eye pain, visual disturbances, or discharge  NECK: No pain or stiffness  RESPIRATORY: No cough, wheezing, chills or hemoptysis; No shortness of breath  CARDIOVASCULAR: No chest pain, palpitations, dizziness, or leg swelling  GASTROINTESTINAL: No abdominal or epigastric pain. No nausea, vomiting, or hematemesis; No diarrhea or constipation. No melena or hematochezia.  GENITOURINARY: No dysuria, frequency, hematuria, or incontinence  NEUROLOGICAL: No headaches, memory loss, loss of strength, numbness, or tremors  SKIN: No itching, burning, rashes, or lesions   MUSCULOSKELETAL: No joint pain or swelling; No muscle, back, or extremity pain  PSYCHIATRIC: No depression, anxiety, mood swings, or difficulty sleeping      MEDICATIONS:  piperacillin/tazobactam IVPB.. 3.375 Gram(s) IV Intermittent every 8 hours    tamsulosin 0.4 milliGRAM(s) Oral at bedtime    loratadine 10 milliGRAM(s) Oral daily  montelukast 10 milliGRAM(s) Oral daily    acetaminophen   Tablet .. 650 milliGRAM(s) Oral every 6 hours PRN      enoxaparin Injectable 40 milliGRAM(s) SubCutaneous daily    pantoprazole    Tablet 40 milliGRAM(s) Oral before breakfast      finasteride 5 milliGRAM(s) Oral daily  levothyroxine 25 MICROGram(s) Oral daily  methylPREDNISolone sodium succinate Injectable 40 milliGRAM(s) IV Push every 12 hours  simvastatin 20 milliGRAM(s) Oral at bedtime    sodium chloride 0.9%. 1000 milliLiter(s) IV Continuous <Continuous>        anakinra Injectable 100 milliGRAM(s) SubCutaneous every 6 hours  anakinra Injectable   SubCutaneous       VITALS:  T(C): 36.4 (04-16-20 @ 08:15), Max: 36.4 (04-15-20 @ 16:09)  HR: 78 (04-16-20 @ 08:15) (78 - 84)  BP: 158/75 (04-16-20 @ 08:15) (130/62 - 158/75)  RR: 20 (04-16-20 @ 08:15) (20 - 22)  SpO2: 98% (04-16-20 @ 08:15) (95% - 98%)  I&O's Detail      PHYSICAL EXAM:  General: No acute distress.  Alert, oriented, interactive, nonfocal  HEENT: Pupils equal, reactive to light.  Symmetric.  PULM: diminished bilaterally, no significant sputum production  CVS: Regular rate and rhythm, no murmurs, rubs, or gallops  ABD: Soft, nondistended, nontender, normoactive bowel sounds, no masses  EXT: No edema, nontender. 2+pulses upper/lower ext  SKIN: Warm and well perfused, no rashes noted.  NEURO: A&Ox2 strength 5/5 all extremities, cranial nerves grossly intact, no focal deficits, not following verbal commands    LABS:                        13.6   4.87  )-----------( 348      ( 16 Apr 2020 06:22 )             42.7     04-16    141  |  105  |  12  ----------------------------<  121<H>  3.6   |  25  |  0.92    Ca    9.2      16 Apr 2020 06:22  Phos  3.0     04-16  Mg     2.6     04-16    TPro  7.9  /  Alb  2.8<L>  /  TBili  0.6  /  DBili  x   /  AST  48<H>  /  ALT  44  /  AlkPhos  68  04-16          CAPILLARY BLOOD GLUCOSE            CULTURES:  Culture Results:   No growth to date. (04-14-20 @ 10:14)  Culture Results:   Growth in aerobic bottle: Escherichia coli ESBL  "Due to technical problems, Proteus sp. will Not be reported as part of  the BCID panel until further notice"  ***Blood Panel PCR results on this specimen are available  approximately 3 hours after the Gram stain result.***  Gram stain, PCR, and/or culture results may not always  correspond due to difference in methodologies.  ************************************************************  This PCR assay was performed using [employer].  The following targets are tested for: Enterococcus,  vancomycin resistant enterococci, Listeria monocytogenes,  coagulase negative staphylococci, S. aureus,  methicillin resistant S. aureus, Streptococcus agalactiae  (Group B), S. pneumoniae, S. pyogenes (Group A),  Acinetobacter baumannii, Enterobacter cloacae, E. coli,  Klebsiella oxytoca, K. pneumoniae, Proteus sp.,  Serratia marcescens, Haemophilus influenzae,  Neisseria meningitidis, Pseudomonas aeruginosa, Candida  albicans, C. glabrata, C krusei, C parapsilosis,  C. tropicalis and the KPC resistance gene. (04-11-20 @ 00:58)  Culture Results:   Growth in anaerobic bottle: Escherichia coli ESBL  See previous culture [id number] (04-11-20 @ 00:58)  Culture Results:   >100,000 CFU/ml Escherichia coli ESBL (04-11-20 @ 00:49)      RADIOLOGY: ***  < from: Xray Chest 1 View-PORTABLE IMMEDIATE (04.10.20 @ 16:42) >    EXAM:  XR CHEST PORTABLE IMMED 1V                            PROCEDURE DATE:  04/10/2020          INTERPRETATION:  CLINICAL INDICATION: 86 years  Male with Shortness of Breath, Cough,  Fever.    COMPARISON: None    AP view of the chest demonstratesminimal left midlung reticular opacities. There is no pleural effusion. There is no pneumothorax.    The heart is normal in size. The aorta is atherosclerotic. There is no mediastinal or hilar mass.     The pulmonary vasculature is normal.     Mild thoracic degenerative changes are present.    IMPRESSION:    Minimal left midlung reticular opacities secondary to discoid atelectasis or viral pneumonia.                JANETTE NUGENT M.D., ATTENDING RADIOLOGIST  This document has been electronically signed. Apr 10 2020  4:09PM                < end of copied text >      A/P: 86yMalewith        #acute hypoxemic respiratory failure, ARDS, COVID-19 viral pneumonia       - c/w oxygenation->  [  XXXXX   ]    Nasal cannula 3L satting 98%                  [      ]  NRB mask __L       - c/w anakinra (day#2/3)       / c/w steroids       - airbourne isolation       - vitals q4hr        - kathya resolved  #ESBL- c/w zosyn (day 5 today) needs 5 more days. for discharge possibly monday to nursing home  case geri beavers

## 2020-04-16 NOTE — DIETITIAN INITIAL EVALUATION ADULT. - OTHER INFO
PT W covid +. Pt is on Airborne isolation Pt seen for LOS. Pt is from NH Diet- TRACIE, NCS diet. Pt w Poor  appetite per NSG. Pt with Stage 2 PU @ L Buttocks May benefit from Pureed  Diet. PT is A & O x 1

## 2020-04-16 NOTE — DIETITIAN INITIAL EVALUATION ADULT. - PROBLEM SELECTOR PLAN 5
Airway  Urgency: elective    Airway not difficult    General Information and Staff    Patient location during procedure: pre-op  CRNA: NIRMAL SMITH    Indications and Patient Condition  Indications for airway management: airway protection    Preoxygenated: yes  Mask difficulty assessment: 1 - vent by mask    Final Airway Details  Final airway type: endotracheal airway      Successful airway: ETT    Successful intubation technique: direct laryngoscopy  Endotracheal tube insertion site: oral  Blade: Mendoza  Blade size: 3.5.  ETT size: 8.5 mm  Cormack-Lehane Classification: grade I - full view of glottis  Placement verified by: chest auscultation and capnometry   Measured from: lips  ETT to lips (cm): 21  Number of attempts at approach: 1               c/w flomax and proscar

## 2020-04-16 NOTE — DIETITIAN INITIAL EVALUATION ADULT. - PROBLEM SELECTOR PLAN 2
was sent from NH due to Shortness of breath  Pt. denies any shortness of breath  - Respiratory status- Not in distress, S02 97% on RA on my evaluation  - T- afebrile  - WBC: 18, lymphopenia 0.5  - CXR - Minimal left midlung reticular opacities secondary to discoid atelectasis or viral pneumonia.  - Ed course; Rocephin, IV NS 500ml.   - Will rule out covid 19 with contact and airborne isolation precaution   - FU CRP, ferritin   - Blood culture , COVID

## 2020-04-17 LAB
ALBUMIN SERPL ELPH-MCNC: 2.7 G/DL — LOW (ref 3.5–5)
ALP SERPL-CCNC: 63 U/L — SIGNIFICANT CHANGE UP (ref 40–120)
ALT FLD-CCNC: 68 U/L DA — HIGH (ref 10–60)
ANION GAP SERPL CALC-SCNC: 8 MMOL/L — SIGNIFICANT CHANGE UP (ref 5–17)
AST SERPL-CCNC: 70 U/L — HIGH (ref 10–40)
BASOPHILS # BLD AUTO: 0.02 K/UL — SIGNIFICANT CHANGE UP (ref 0–0.2)
BASOPHILS NFR BLD AUTO: 0.2 % — SIGNIFICANT CHANGE UP (ref 0–2)
BILIRUB SERPL-MCNC: 0.6 MG/DL — SIGNIFICANT CHANGE UP (ref 0.2–1.2)
BUN SERPL-MCNC: 17 MG/DL — SIGNIFICANT CHANGE UP (ref 7–18)
CALCIUM SERPL-MCNC: 9.2 MG/DL — SIGNIFICANT CHANGE UP (ref 8.4–10.5)
CHLORIDE SERPL-SCNC: 105 MMOL/L — SIGNIFICANT CHANGE UP (ref 96–108)
CO2 SERPL-SCNC: 28 MMOL/L — SIGNIFICANT CHANGE UP (ref 22–31)
CREAT SERPL-MCNC: 0.91 MG/DL — SIGNIFICANT CHANGE UP (ref 0.5–1.3)
CRP SERPL-MCNC: 1.27 MG/DL — HIGH (ref 0–0.4)
EOSINOPHIL # BLD AUTO: 0 K/UL — SIGNIFICANT CHANGE UP (ref 0–0.5)
EOSINOPHIL NFR BLD AUTO: 0 % — SIGNIFICANT CHANGE UP (ref 0–6)
GLUCOSE SERPL-MCNC: 131 MG/DL — HIGH (ref 70–99)
HCT VFR BLD CALC: 41.6 % — SIGNIFICANT CHANGE UP (ref 39–50)
HGB BLD-MCNC: 13.4 G/DL — SIGNIFICANT CHANGE UP (ref 13–17)
IMM GRANULOCYTES NFR BLD AUTO: 1.9 % — HIGH (ref 0–1.5)
LYMPHOCYTES # BLD AUTO: 0.84 K/UL — LOW (ref 1–3.3)
LYMPHOCYTES # BLD AUTO: 9.2 % — LOW (ref 13–44)
MAGNESIUM SERPL-MCNC: 2.4 MG/DL — SIGNIFICANT CHANGE UP (ref 1.6–2.6)
MCHC RBC-ENTMCNC: 27.5 PG — SIGNIFICANT CHANGE UP (ref 27–34)
MCHC RBC-ENTMCNC: 32.2 GM/DL — SIGNIFICANT CHANGE UP (ref 32–36)
MCV RBC AUTO: 85.4 FL — SIGNIFICANT CHANGE UP (ref 80–100)
MONOCYTES # BLD AUTO: 0.38 K/UL — SIGNIFICANT CHANGE UP (ref 0–0.9)
MONOCYTES NFR BLD AUTO: 4.2 % — SIGNIFICANT CHANGE UP (ref 2–14)
NEUTROPHILS # BLD AUTO: 7.68 K/UL — HIGH (ref 1.8–7.4)
NEUTROPHILS NFR BLD AUTO: 84.5 % — HIGH (ref 43–77)
NRBC # BLD: 0 /100 WBCS — SIGNIFICANT CHANGE UP (ref 0–0)
PHOSPHATE SERPL-MCNC: 2.9 MG/DL — SIGNIFICANT CHANGE UP (ref 2.5–4.5)
PLATELET # BLD AUTO: 371 K/UL — SIGNIFICANT CHANGE UP (ref 150–400)
POTASSIUM SERPL-MCNC: 3.4 MMOL/L — LOW (ref 3.5–5.3)
POTASSIUM SERPL-SCNC: 3.4 MMOL/L — LOW (ref 3.5–5.3)
PROT SERPL-MCNC: 7.5 G/DL — SIGNIFICANT CHANGE UP (ref 6–8.3)
RBC # BLD: 4.87 M/UL — SIGNIFICANT CHANGE UP (ref 4.2–5.8)
RBC # FLD: 14.2 % — SIGNIFICANT CHANGE UP (ref 10.3–14.5)
SODIUM SERPL-SCNC: 141 MMOL/L — SIGNIFICANT CHANGE UP (ref 135–145)
WBC # BLD: 9.09 K/UL — SIGNIFICANT CHANGE UP (ref 3.8–10.5)
WBC # FLD AUTO: 9.09 K/UL — SIGNIFICANT CHANGE UP (ref 3.8–10.5)

## 2020-04-17 RX ADMIN — Medication 25 MICROGRAM(S): at 06:01

## 2020-04-17 RX ADMIN — Medication 100 MILLIGRAM(S): at 12:27

## 2020-04-17 RX ADMIN — ENOXAPARIN SODIUM 40 MILLIGRAM(S): 100 INJECTION SUBCUTANEOUS at 12:25

## 2020-04-17 RX ADMIN — PIPERACILLIN AND TAZOBACTAM 25 GRAM(S): 4; .5 INJECTION, POWDER, LYOPHILIZED, FOR SOLUTION INTRAVENOUS at 21:07

## 2020-04-17 RX ADMIN — TAMSULOSIN HYDROCHLORIDE 0.4 MILLIGRAM(S): 0.4 CAPSULE ORAL at 21:07

## 2020-04-17 RX ADMIN — PIPERACILLIN AND TAZOBACTAM 25 GRAM(S): 4; .5 INJECTION, POWDER, LYOPHILIZED, FOR SOLUTION INTRAVENOUS at 06:00

## 2020-04-17 RX ADMIN — Medication 40 MILLIGRAM(S): at 06:01

## 2020-04-17 RX ADMIN — PANTOPRAZOLE SODIUM 40 MILLIGRAM(S): 20 TABLET, DELAYED RELEASE ORAL at 06:00

## 2020-04-17 RX ADMIN — FINASTERIDE 5 MILLIGRAM(S): 5 TABLET, FILM COATED ORAL at 12:25

## 2020-04-17 RX ADMIN — Medication 100 MILLIGRAM(S): at 17:54

## 2020-04-17 RX ADMIN — MONTELUKAST 10 MILLIGRAM(S): 4 TABLET, CHEWABLE ORAL at 12:25

## 2020-04-17 RX ADMIN — Medication 100 MILLIGRAM(S): at 23:09

## 2020-04-17 RX ADMIN — SIMVASTATIN 20 MILLIGRAM(S): 20 TABLET, FILM COATED ORAL at 21:07

## 2020-04-17 RX ADMIN — Medication 100 MILLIGRAM(S): at 00:01

## 2020-04-17 RX ADMIN — Medication 100 MILLIGRAM(S): at 06:01

## 2020-04-17 RX ADMIN — LORATADINE 10 MILLIGRAM(S): 10 TABLET ORAL at 12:25

## 2020-04-17 RX ADMIN — Medication 40 MILLIGRAM(S): at 17:53

## 2020-04-17 RX ADMIN — PIPERACILLIN AND TAZOBACTAM 25 GRAM(S): 4; .5 INJECTION, POWDER, LYOPHILIZED, FOR SOLUTION INTRAVENOUS at 14:29

## 2020-04-17 NOTE — PROGRESS NOTE ADULT - SUBJECTIVE AND OBJECTIVE BOX
CRISTY MAGAÑA  86y  [id number]    Patient is a 86y old  Male who presents with a chief complaint of shortness of breath (15 Apr 2020 11:04)    HPI:  86M from [facility], [facility] of BPH, hemorrhoids, HLD, Hypothyroid, osteoarthritis, GERD was sent from NH for shortness of breath as per NH paper. Pt. is AAO X1 and thinks he is in [facility]. He states he does not know why he was sent to hospital. Denies any pain, fever, chills, flu like symptoms, urinary or bowel complaints. (11 Apr 2020 09:30)   (on admission)    LOS day#7  Events last 24 hours: 85yo, full code, patient +covid19, currently on zosyn for esbl + UTI  today patient on day 6/10 of Zosyn IV  to be discharged on [facility] upon completion of abx, likely monday  satting 98% RA  pt is confused, unable to obtain ros      PAST MEDICAL & SURGICAL HISTORY:  HLD (hyperlipidemia)  HTN (hypertension)  Hypothyroidism  No significant past surgical history        ROS:  EYES: No eye pain, visual disturbances, or discharge  NECK: No pain or stiffness  RESPIRATORY: No cough, wheezing, chills or hemoptysis; No shortness of breath  CARDIOVASCULAR: No chest pain, palpitations, dizziness, or leg swelling  GASTROINTESTINAL: No abdominal or epigastric pain. No nausea, vomiting, or hematemesis; No diarrhea or constipation. No melena or hematochezia.  GENITOURINARY: No dysuria, frequency, hematuria, or incontinence  NEUROLOGICAL: No headaches, memory loss, loss of strength, numbness, or tremors  SKIN: No itching, burning, rashes, or lesions   MUSCULOSKELETAL: No joint pain or swelling; No muscle, back, or extremity pain  PSYCHIATRIC: No depression, anxiety, mood swings, or difficulty sleeping      MEDICATIONS:  piperacillin/tazobactam IVPB.. 3.375 Gram(s) IV Intermittent every 8 hours    tamsulosin 0.4 milliGRAM(s) Oral at bedtime    loratadine 10 milliGRAM(s) Oral daily  montelukast 10 milliGRAM(s) Oral daily    acetaminophen   Tablet .. 650 milliGRAM(s) Oral every 6 hours PRN      enoxaparin Injectable 40 milliGRAM(s) SubCutaneous daily    pantoprazole    Tablet 40 milliGRAM(s) Oral before breakfast      finasteride 5 milliGRAM(s) Oral daily  levothyroxine 25 MICROGram(s) Oral daily  methylPREDNISolone sodium succinate Injectable 40 milliGRAM(s) IV Push every 12 hours  simvastatin 20 milliGRAM(s) Oral at bedtime    sodium chloride 0.9%. 1000 milliLiter(s) IV Continuous <Continuous>        anakinra Injectable 100 milliGRAM(s) SubCutaneous every 6 hours  anakinra Injectable   SubCutaneous     Vital Signs Last 24 Hrs  T(C): 36.4 (17 Apr 2020 08:43), Max: 36.7 (17 Apr 2020 06:18)  T(F): 97.6 (17 Apr 2020 08:43), Max: 98 (17 Apr 2020 06:18)  HR: 83 (17 Apr 2020 08:43) (73 - 94)  BP: 154/83 (17 Apr 2020 08:43) (131/65 - 156/81)  BP(mean): --  RR: 20 (17 Apr 2020 08:43) (17 - 20)  SpO2: 98% (17 Apr 2020 08:43) (97% - 98%)    PHYSICAL EXAM:  General: No acute distress.  Alert, oriented, interactive, nonfocal  HEENT: Pupils equal, reactive to light.  Symmetric.  PULM: diminished bilaterally, no significant sputum production  CVS: Regular rate and rhythm, no murmurs, rubs, or gallops  ABD: Soft, nondistended, nontender, normoactive bowel sounds, no masses  EXT: No edema, nontender. 2+pulses upper/lower ext  SKIN: Warm and well perfused, no rashes noted.  NEURO: A&Ox2 strength 5/5 all extremities, cranial nerves grossly intact, no focal deficits, not following verbal commands    LABS:                                   13.4   9.09  )-----------( 371      ( 17 Apr 2020 06:18 )             41.6   04-17    141  |  105  |  17  ----------------------------<  131<H>  3.4<L>   |  28  |  0.91    Ca    9.2      17 Apr 2020 06:18  Phos  2.9     04-17    Mg     2.4     04-17    TPro  7.5  /  Alb  2.7<L>  /  TBili  0.6  /  DBili  x   /  AST  70<H>  /  ALT  68<H>  /  AlkPhos  63  04-17        CULTURES:  Culture Results:   No growth to date. (04-14-20 @ 10:14)  Culture Results:   Growth in aerobic bottle: Escherichia coli ESBL  "Due to technical problems, Proteus sp. will Not be reported as part of  the BCID panel until further notice"  ***Blood Panel PCR results on this specimen are available  approximately 3 hours after the Gram stain result.***  Gram stain, PCR, and/or culture results may not always  correspond due to difference in methodologies.  ************************************************************  This PCR assay was performed using [employer].  The following targets are tested for: Enterococcus,  vancomycin resistant enterococci, Listeria monocytogenes,  coagulase negative staphylococci, S. aureus,  methicillin resistant S. aureus, Streptococcus agalactiae  (Group B), S. pneumoniae, S. pyogenes (Group A),  Acinetobacter baumannii, Enterobacter cloacae, E. coli,  Klebsiella oxytoca, K. pneumoniae, Proteus sp.,  Serratia marcescens, Haemophilus influenzae,  Neisseria meningitidis, Pseudomonas aeruginosa, Candida  albicans, C. glabrata, C krusei, C parapsilosis,  C. tropicalis and the KPC resistance gene. (04-11-20 @ 00:58)  Culture Results:   Growth in anaerobic bottle: Escherichia coli ESBL  See previous culture [id number] (04-11-20 @ 00:58)  Culture Results:   >100,000 CFU/ml Escherichia coli ESBL (04-11-20 @ 00:49)      RADIOLOGY: ***  < from: Xray Chest 1 View-PORTABLE IMMEDIATE (04.10.20 @ 16:42) >    EXAM:  XR CHEST PORTABLE IMMED 1V                            PROCEDURE DATE:  04/10/2020          INTERPRETATION:  CLINICAL INDICATION: 86 years  Male with Shortness of Breath, Cough,  Fever.    COMPARISON: None    AP view of the chest demonstratesminimal left midlung reticular opacities. There is no pleural effusion. There is no pneumothorax.    The heart is normal in size. The aorta is atherosclerotic. There is no mediastinal or hilar mass.     The pulmonary vasculature is normal.     Mild thoracic degenerative changes are present.    IMPRESSION:    Minimal left midlung reticular opacities secondary to discoid atelectasis or viral pneumonia.                JANETTE NUGENT M.D., ATTENDING RADIOLOGIST  This document has been electronically signed. Apr 10 2020  4:09PM                < end of copied text >      A/P: 86yMalewith        #acute hypoxemic respiratory failure, ARDS, COVID-19 viral pneumonia       - satting well room air 98%       - c/w anakinra (day#3/3)       / c/w steroids       - airbourne isolation       - vitals q4hr        - kathya resolved  #ESBL- c/w zosyn (day 6 today) needs 5 more days. for discharge possibly monday to nursing home        - case dw dr beavers and dr pedraza ID

## 2020-04-18 ENCOUNTER — TRANSCRIPTION ENCOUNTER (OUTPATIENT)
Age: 85
End: 2020-04-18

## 2020-04-18 RX ADMIN — TAMSULOSIN HYDROCHLORIDE 0.4 MILLIGRAM(S): 0.4 CAPSULE ORAL at 22:41

## 2020-04-18 RX ADMIN — MONTELUKAST 10 MILLIGRAM(S): 4 TABLET, CHEWABLE ORAL at 12:02

## 2020-04-18 RX ADMIN — Medication 100 MILLIGRAM(S): at 17:01

## 2020-04-18 RX ADMIN — LORATADINE 10 MILLIGRAM(S): 10 TABLET ORAL at 12:02

## 2020-04-18 RX ADMIN — PANTOPRAZOLE SODIUM 40 MILLIGRAM(S): 20 TABLET, DELAYED RELEASE ORAL at 05:20

## 2020-04-18 RX ADMIN — ENOXAPARIN SODIUM 40 MILLIGRAM(S): 100 INJECTION SUBCUTANEOUS at 12:02

## 2020-04-18 RX ADMIN — Medication 100 MILLIGRAM(S): at 05:20

## 2020-04-18 RX ADMIN — PIPERACILLIN AND TAZOBACTAM 25 GRAM(S): 4; .5 INJECTION, POWDER, LYOPHILIZED, FOR SOLUTION INTRAVENOUS at 05:19

## 2020-04-18 RX ADMIN — Medication 25 MICROGRAM(S): at 05:19

## 2020-04-18 RX ADMIN — SIMVASTATIN 20 MILLIGRAM(S): 20 TABLET, FILM COATED ORAL at 22:41

## 2020-04-18 RX ADMIN — PIPERACILLIN AND TAZOBACTAM 25 GRAM(S): 4; .5 INJECTION, POWDER, LYOPHILIZED, FOR SOLUTION INTRAVENOUS at 14:31

## 2020-04-18 RX ADMIN — PIPERACILLIN AND TAZOBACTAM 25 GRAM(S): 4; .5 INJECTION, POWDER, LYOPHILIZED, FOR SOLUTION INTRAVENOUS at 22:41

## 2020-04-18 RX ADMIN — FINASTERIDE 5 MILLIGRAM(S): 5 TABLET, FILM COATED ORAL at 12:02

## 2020-04-19 LAB
CULTURE RESULTS: SIGNIFICANT CHANGE UP
SPECIMEN SOURCE: SIGNIFICANT CHANGE UP

## 2020-04-19 RX ADMIN — Medication 100 MILLIGRAM(S): at 18:01

## 2020-04-19 RX ADMIN — MONTELUKAST 10 MILLIGRAM(S): 4 TABLET, CHEWABLE ORAL at 11:53

## 2020-04-19 RX ADMIN — SIMVASTATIN 20 MILLIGRAM(S): 20 TABLET, FILM COATED ORAL at 22:10

## 2020-04-19 RX ADMIN — FINASTERIDE 5 MILLIGRAM(S): 5 TABLET, FILM COATED ORAL at 11:53

## 2020-04-19 RX ADMIN — ENOXAPARIN SODIUM 40 MILLIGRAM(S): 100 INJECTION SUBCUTANEOUS at 11:53

## 2020-04-19 RX ADMIN — PIPERACILLIN AND TAZOBACTAM 25 GRAM(S): 4; .5 INJECTION, POWDER, LYOPHILIZED, FOR SOLUTION INTRAVENOUS at 13:03

## 2020-04-19 RX ADMIN — PANTOPRAZOLE SODIUM 40 MILLIGRAM(S): 20 TABLET, DELAYED RELEASE ORAL at 05:18

## 2020-04-19 RX ADMIN — PIPERACILLIN AND TAZOBACTAM 25 GRAM(S): 4; .5 INJECTION, POWDER, LYOPHILIZED, FOR SOLUTION INTRAVENOUS at 05:18

## 2020-04-19 RX ADMIN — PIPERACILLIN AND TAZOBACTAM 25 GRAM(S): 4; .5 INJECTION, POWDER, LYOPHILIZED, FOR SOLUTION INTRAVENOUS at 22:10

## 2020-04-19 RX ADMIN — LORATADINE 10 MILLIGRAM(S): 10 TABLET ORAL at 11:53

## 2020-04-19 RX ADMIN — Medication 25 MICROGRAM(S): at 05:17

## 2020-04-19 RX ADMIN — Medication 100 MILLIGRAM(S): at 05:18

## 2020-04-19 RX ADMIN — TAMSULOSIN HYDROCHLORIDE 0.4 MILLIGRAM(S): 0.4 CAPSULE ORAL at 22:10

## 2020-04-19 NOTE — DISCHARGE NOTE PROVIDER - NSDCCPCAREPLAN_GEN_ALL_CORE_FT
PRINCIPAL DISCHARGE DIAGNOSIS  Diagnosis: Bacteremia, escherichia coli  Assessment and Plan of Treatment: you were admitted for fever and shortness of breath and found to have blood stream infection and UTI with ESBL Ecoli bacteria, you were started on IV antibiotics and blood cultures were repeated and were negative.  antibiotics were completed      SECONDARY DISCHARGE DIAGNOSES  Diagnosis: Pneumonia due to COVID-19 virus  Assessment and Plan of Treatment: You were found to have Covid 19. You were treated with Anakinra. Seek immediate medical attention if you develop shortness of breath. See additional instructions below.   CORONAVIRUS INSTRUCTIONS:   Based on your current clinical status and stability, it has been determined that you no longer need hospitalization and can recover while remaining in self-quarantine at home. You should follow the prevention steps below until a healthcare provider or local or state health department says you can return to your normal activities.   1. You should restrict activities outside your home, except for getting medical care.   2. Do not go to work, school, or public areas.   3. Avoid using public transportation, ride-sharing, or taxis.   4. Separate yourself from other people and animals in your home as much as possible.  When you are around other people (e.g., sharing a room or vehicle) you should wear a facemask.  5. Wash your hands often with soap and water for at least 20 seconds, especially after blowing your nose, coughing, or sneezing; going to the bathroom; and before eating or preparing food.  6. Cover your mouth and nose with a tissue when you cough or sneeze. Throw used tissues in a lined trash can. Immediately wash your hands with soap and water for at least 20 seconds  7. High touch surfaces include counters, tabletops, doorknobs, bathroom fixtures, toilets, phones, keyboards, tablets, and bedside tables.  8. Avoid sharing dishes, drinking glasses, cups, eating utensils, towels, or bedding with other people or pets in your home. After using these items, they should be washed thoroughly with soap and water.  You are strongly advised to seek prompt medical attention if your illness worsens or you develop new symptoms like fever or difficulty breathing.   Please call   to speak with your discharging physician if you have any questions.      Diagnosis: BPH (benign prostatic hyperplasia)  Assessment and Plan of Treatment: continue home medications

## 2020-04-19 NOTE — DISCHARGE NOTE PROVIDER - CARE PROVIDER_API CALL
Jd Ray; MBBS)  Internal Medicine  [address] [place] [address]  Phone: [phone]  Fax: [phone]  Follow Up Time: 1 week

## 2020-04-19 NOTE — DISCHARGE NOTE PROVIDER - HOSPITAL COURSE
86M from [facility], PMH of BPH, hemorrhoids, HLD, Hypothyroid, osteoarthritis, GERD was sent from NH for shortness of breath as per NH paper. found to have UTI and bacteremia  sec to ESBL ecoli, started on Zosyn for total of 10 days. also found be positive for COVID 19 with acute hypoxemic respiratory failure and COVID-19 viral pneumonia, started on Anakinra and steroids, airborne precautions and supportive treatment.     clinically improving, sats >93% on room air, medically optimized for discharge back to facility 86M from [facility], PMH of BPH, hemorrhoids, HLD, Hypothyroid, osteoarthritis, GERD was sent from NH for shortness of breath as per NH paper. found to have UTI and bacteremia  sec to ESBL ecoli, started on Zosyn for total of 10 days. also found be positive for COVID 19 with acute hypoxemic respiratory failure and COVID-19 viral pneumonia, started on Anakinra and steroids, airborne precautions and supportive treatment.     clinically improving, sats >93% on room air, medically optimized for discharge to facility

## 2020-04-19 NOTE — DISCHARGE NOTE PROVIDER - NSDCMRMEDTOKEN_GEN_ALL_CORE_FT
cefTRIAXone 1 g intravenous injection: 1 gram(s) intravenous every 24 hours  finasteride 5 mg oral tablet: 1 tab(s) orally once a day  levothyroxine 25 mcg (0.025 mg) oral tablet: 1 tab(s) orally once a day  loratadine 10 mg oral tablet: 1 tab(s) orally once a day  omeprazole 20 mg oral delayed release capsule: 1 cap(s) orally once a day  simvastatin 20 mg oral tablet: 1 tab(s) orally once a day (at bedtime)  Singulair 10 mg oral tablet: 1 tab(s) orally once a day  tamsulosin 0.4 mg oral capsule: 1 cap(s) orally once a day

## 2020-04-20 ENCOUNTER — TRANSCRIPTION ENCOUNTER (OUTPATIENT)
Age: 85
End: 2020-04-20

## 2020-04-20 VITALS
SYSTOLIC BLOOD PRESSURE: 124 MMHG | OXYGEN SATURATION: 97 % | RESPIRATION RATE: 18 BRPM | HEART RATE: 82 BPM | DIASTOLIC BLOOD PRESSURE: 62 MMHG

## 2020-04-20 LAB
ALBUMIN SERPL ELPH-MCNC: 2.8 G/DL — LOW (ref 3.5–5)
ALP SERPL-CCNC: 57 U/L — SIGNIFICANT CHANGE UP (ref 40–120)
ALT FLD-CCNC: 104 U/L DA — HIGH (ref 10–60)
ANION GAP SERPL CALC-SCNC: 9 MMOL/L — SIGNIFICANT CHANGE UP (ref 5–17)
ANISOCYTOSIS BLD QL: SLIGHT — SIGNIFICANT CHANGE UP
AST SERPL-CCNC: 55 U/L — HIGH (ref 10–40)
BASOPHILS # BLD AUTO: 0 K/UL — SIGNIFICANT CHANGE UP (ref 0–0.2)
BASOPHILS NFR BLD AUTO: 0 % — SIGNIFICANT CHANGE UP (ref 0–2)
BILIRUB SERPL-MCNC: 0.7 MG/DL — SIGNIFICANT CHANGE UP (ref 0.2–1.2)
BUN SERPL-MCNC: 22 MG/DL — HIGH (ref 7–18)
CALCIUM SERPL-MCNC: 9 MG/DL — SIGNIFICANT CHANGE UP (ref 8.4–10.5)
CHLORIDE SERPL-SCNC: 104 MMOL/L — SIGNIFICANT CHANGE UP (ref 96–108)
CO2 SERPL-SCNC: 27 MMOL/L — SIGNIFICANT CHANGE UP (ref 22–31)
CREAT SERPL-MCNC: 1.14 MG/DL — SIGNIFICANT CHANGE UP (ref 0.5–1.3)
CRP SERPL-MCNC: 0.45 MG/DL — HIGH (ref 0–0.4)
ELLIPTOCYTES BLD QL SMEAR: SLIGHT — SIGNIFICANT CHANGE UP
EOSINOPHIL # BLD AUTO: 0.19 K/UL — SIGNIFICANT CHANGE UP (ref 0–0.5)
EOSINOPHIL NFR BLD AUTO: 3 % — SIGNIFICANT CHANGE UP (ref 0–6)
FERRITIN SERPL-MCNC: 614 NG/ML — HIGH (ref 30–400)
GLUCOSE SERPL-MCNC: 111 MG/DL — HIGH (ref 70–99)
HCT VFR BLD CALC: 43 % — SIGNIFICANT CHANGE UP (ref 39–50)
HGB BLD-MCNC: 13.5 G/DL — SIGNIFICANT CHANGE UP (ref 13–17)
LDH SERPL L TO P-CCNC: 158 U/L — SIGNIFICANT CHANGE UP (ref 120–225)
LYMPHOCYTES # BLD AUTO: 1.29 K/UL — SIGNIFICANT CHANGE UP (ref 1–3.3)
LYMPHOCYTES # BLD AUTO: 20 % — SIGNIFICANT CHANGE UP (ref 13–44)
MAGNESIUM SERPL-MCNC: 2.7 MG/DL — HIGH (ref 1.6–2.6)
MANUAL SMEAR VERIFICATION: SIGNIFICANT CHANGE UP
MCHC RBC-ENTMCNC: 27.5 PG — SIGNIFICANT CHANGE UP (ref 27–34)
MCHC RBC-ENTMCNC: 31.4 GM/DL — LOW (ref 32–36)
MCV RBC AUTO: 87.6 FL — SIGNIFICANT CHANGE UP (ref 80–100)
MONOCYTES # BLD AUTO: 0.52 K/UL — SIGNIFICANT CHANGE UP (ref 0–0.9)
MONOCYTES NFR BLD AUTO: 8 % — SIGNIFICANT CHANGE UP (ref 2–14)
MYELOCYTES NFR BLD: 1 % — HIGH (ref 0–0)
NEUTROPHILS # BLD AUTO: 4.39 K/UL — SIGNIFICANT CHANGE UP (ref 1.8–7.4)
NEUTROPHILS NFR BLD AUTO: 68 % — SIGNIFICANT CHANGE UP (ref 43–77)
NRBC # BLD: 0 /100 — SIGNIFICANT CHANGE UP (ref 0–0)
PHOSPHATE SERPL-MCNC: 3.3 MG/DL — SIGNIFICANT CHANGE UP (ref 2.5–4.5)
PLAT MORPH BLD: NORMAL — SIGNIFICANT CHANGE UP
PLATELET # BLD AUTO: 282 K/UL — SIGNIFICANT CHANGE UP (ref 150–400)
POTASSIUM SERPL-MCNC: 3.5 MMOL/L — SIGNIFICANT CHANGE UP (ref 3.5–5.3)
POTASSIUM SERPL-SCNC: 3.5 MMOL/L — SIGNIFICANT CHANGE UP (ref 3.5–5.3)
PROT SERPL-MCNC: 7.1 G/DL — SIGNIFICANT CHANGE UP (ref 6–8.3)
RBC # BLD: 4.91 M/UL — SIGNIFICANT CHANGE UP (ref 4.2–5.8)
RBC # FLD: 14.6 % — HIGH (ref 10.3–14.5)
RBC BLD AUTO: ABNORMAL
SODIUM SERPL-SCNC: 140 MMOL/L — SIGNIFICANT CHANGE UP (ref 135–145)
WBC # BLD: 6.46 K/UL — SIGNIFICANT CHANGE UP (ref 3.8–10.5)
WBC # FLD AUTO: 6.46 K/UL — SIGNIFICANT CHANGE UP (ref 3.8–10.5)

## 2020-04-20 RX ADMIN — FINASTERIDE 5 MILLIGRAM(S): 5 TABLET, FILM COATED ORAL at 12:22

## 2020-04-20 RX ADMIN — Medication 100 MILLIGRAM(S): at 05:18

## 2020-04-20 RX ADMIN — LORATADINE 10 MILLIGRAM(S): 10 TABLET ORAL at 12:22

## 2020-04-20 RX ADMIN — Medication 25 MICROGRAM(S): at 05:18

## 2020-04-20 RX ADMIN — Medication 100 MILLIGRAM(S): at 17:58

## 2020-04-20 RX ADMIN — PIPERACILLIN AND TAZOBACTAM 25 GRAM(S): 4; .5 INJECTION, POWDER, LYOPHILIZED, FOR SOLUTION INTRAVENOUS at 05:18

## 2020-04-20 RX ADMIN — PANTOPRAZOLE SODIUM 40 MILLIGRAM(S): 20 TABLET, DELAYED RELEASE ORAL at 08:28

## 2020-04-20 RX ADMIN — ENOXAPARIN SODIUM 40 MILLIGRAM(S): 100 INJECTION SUBCUTANEOUS at 12:22

## 2020-04-20 RX ADMIN — PIPERACILLIN AND TAZOBACTAM 25 GRAM(S): 4; .5 INJECTION, POWDER, LYOPHILIZED, FOR SOLUTION INTRAVENOUS at 12:22

## 2020-04-20 RX ADMIN — MONTELUKAST 10 MILLIGRAM(S): 4 TABLET, CHEWABLE ORAL at 12:22

## 2020-04-20 NOTE — DISCHARGE NOTE NURSING/CASE MANAGEMENT/SOCIAL WORK - PATIENT PORTAL LINK FT
You can access the FollowMyHealth Patient Portal offered by [facility] by registering at the following website: [email or web address]. By joining [facility]’s FollowMyHealth portal, you will also be able to view your health information using other applications (apps) compatible with our system.

## 2020-04-20 NOTE — PROGRESS NOTE ADULT - ASSESSMENT
86 year old male with PMH HTN, HLD, hypothyroidism. COVID19+. ESBL E.coli UTI and bacteremia    - discharge planning to rehab  - no further antibiotic needs after discharge  - Discuss with Dr. Mejia

## 2020-04-20 NOTE — PROGRESS NOTE ADULT - SUBJECTIVE AND OBJECTIVE BOX
Patient seen and examined at bedside with no complaints.     Vital Signs Last 24 Hrs  T(F): 97.7 (04-20-20 @ 08:34), Max: 98.4 (04-19-20 @ 17:11)  HR: 87 (04-20-20 @ 08:34)  BP: 131/76 (04-20-20 @ 08:34)  RR: 20 (04-20-20 @ 08:34)  SpO2: 96% (04-20-20 @ 08:34) on RA    GENERAL: Alert, NAD  CHEST/LUNG: Clear to auscultation bilaterally, respirations nonlabored  ABDOMEN: soft, Nontender, Nondistended    I&O's Detail    LABS:                        13.5   6.46  )-----------( 282      ( 20 Apr 2020 06:30 )             43.0     04-20    140  |  104  |  22<H>  ----------------------------<  111<H>  3.5   |  27  |  1.14    Ca    9.0      20 Apr 2020 06:30  Phos  3.3     04-20  Mg     2.7     04-20    TPro  7.1  /  Alb  2.8<L>  /  TBili  0.7  /  DBili  x   /  AST  55<H>  /  ALT  104<H>  /  AlkPhos  57  04-20

## 2020-06-02 PROCEDURE — 84100 ASSAY OF PHOSPHORUS: CPT

## 2020-06-02 PROCEDURE — 87635 SARS-COV-2 COVID-19 AMP PRB: CPT

## 2020-06-02 PROCEDURE — 85610 PROTHROMBIN TIME: CPT

## 2020-06-02 PROCEDURE — 83615 LACTATE (LD) (LDH) ENZYME: CPT

## 2020-06-02 PROCEDURE — 85027 COMPLETE CBC AUTOMATED: CPT

## 2020-06-02 PROCEDURE — 74177 CT ABD & PELVIS W/CONTRAST: CPT

## 2020-06-02 PROCEDURE — 36415 COLL VENOUS BLD VENIPUNCTURE: CPT

## 2020-06-02 PROCEDURE — 83880 ASSAY OF NATRIURETIC PEPTIDE: CPT

## 2020-06-02 PROCEDURE — 80053 COMPREHEN METABOLIC PANEL: CPT

## 2020-06-02 PROCEDURE — 87150 DNA/RNA AMPLIFIED PROBE: CPT

## 2020-06-02 PROCEDURE — 83735 ASSAY OF MAGNESIUM: CPT

## 2020-06-02 PROCEDURE — 87040 BLOOD CULTURE FOR BACTERIA: CPT

## 2020-06-02 PROCEDURE — 81001 URINALYSIS AUTO W/SCOPE: CPT

## 2020-06-02 PROCEDURE — 96374 THER/PROPH/DIAG INJ IV PUSH: CPT | Mod: XU

## 2020-06-02 PROCEDURE — 82607 VITAMIN B-12: CPT

## 2020-06-02 PROCEDURE — 71045 X-RAY EXAM CHEST 1 VIEW: CPT

## 2020-06-02 PROCEDURE — 80061 LIPID PANEL: CPT

## 2020-06-02 PROCEDURE — 82803 BLOOD GASES ANY COMBINATION: CPT

## 2020-06-02 PROCEDURE — 87086 URINE CULTURE/COLONY COUNT: CPT

## 2020-06-02 PROCEDURE — 84443 ASSAY THYROID STIM HORMONE: CPT

## 2020-06-02 PROCEDURE — 85730 THROMBOPLASTIN TIME PARTIAL: CPT

## 2020-06-02 PROCEDURE — 87186 SC STD MICRODIL/AGAR DIL: CPT

## 2020-06-02 PROCEDURE — 84484 ASSAY OF TROPONIN QUANT: CPT

## 2020-06-02 PROCEDURE — 99285 EMERGENCY DEPT VISIT HI MDM: CPT | Mod: 25

## 2020-06-02 PROCEDURE — 85379 FIBRIN DEGRADATION QUANT: CPT

## 2020-06-02 PROCEDURE — 86140 C-REACTIVE PROTEIN: CPT

## 2020-06-02 PROCEDURE — 83036 HEMOGLOBIN GLYCOSYLATED A1C: CPT

## 2020-06-02 PROCEDURE — 93005 ELECTROCARDIOGRAM TRACING: CPT

## 2020-06-02 PROCEDURE — 83605 ASSAY OF LACTIC ACID: CPT

## 2020-06-02 PROCEDURE — 82728 ASSAY OF FERRITIN: CPT

## 2021-01-03 ENCOUNTER — EMERGENCY (EMERGENCY)
Facility: HOSPITAL | Age: 86
LOS: 1 days | Discharge: HOME | End: 2021-01-03
Attending: EMERGENCY MEDICINE
Payer: MEDICARE

## 2021-01-03 VITALS
HEART RATE: 83 BPM | SYSTOLIC BLOOD PRESSURE: 150 MMHG | OXYGEN SATURATION: 96 % | DIASTOLIC BLOOD PRESSURE: 78 MMHG | TEMPERATURE: 98 F | RESPIRATION RATE: 17 BRPM

## 2021-01-03 PROCEDURE — 99285 EMERGENCY DEPT VISIT HI MDM: CPT

## 2021-01-04 VITALS
SYSTOLIC BLOOD PRESSURE: 176 MMHG | TEMPERATURE: 98 F | DIASTOLIC BLOOD PRESSURE: 86 MMHG | HEART RATE: 84 BPM | OXYGEN SATURATION: 99 % | RESPIRATION RATE: 18 BRPM

## 2021-01-04 PROBLEM — E03.9 HYPOTHYROIDISM, UNSPECIFIED: Chronic | Status: ACTIVE | Noted: 2020-04-11

## 2021-01-04 PROBLEM — I10 ESSENTIAL (PRIMARY) HYPERTENSION: Chronic | Status: ACTIVE | Noted: 2020-04-11

## 2021-01-04 PROBLEM — E78.5 HYPERLIPIDEMIA, UNSPECIFIED: Chronic | Status: ACTIVE | Noted: 2020-04-11

## 2021-01-04 LAB
ALBUMIN SERPL ELPH-MCNC: 4 G/DL — SIGNIFICANT CHANGE UP (ref 3.5–5)
ALP SERPL-CCNC: 61 U/L — SIGNIFICANT CHANGE UP (ref 40–120)
ALT FLD-CCNC: 26 U/L DA — SIGNIFICANT CHANGE UP (ref 10–60)
ANION GAP SERPL CALC-SCNC: 9 MMOL/L — SIGNIFICANT CHANGE UP (ref 5–17)
APPEARANCE UR: CLEAR — SIGNIFICANT CHANGE UP
AST SERPL-CCNC: 18 U/L — SIGNIFICANT CHANGE UP (ref 10–40)
BASOPHILS # BLD AUTO: 0.03 K/UL — SIGNIFICANT CHANGE UP (ref 0–0.2)
BASOPHILS NFR BLD AUTO: 0.3 % — SIGNIFICANT CHANGE UP (ref 0–2)
BILIRUB SERPL-MCNC: 0.5 MG/DL — SIGNIFICANT CHANGE UP (ref 0.2–1.2)
BILIRUB UR-MCNC: NEGATIVE — SIGNIFICANT CHANGE UP
BUN SERPL-MCNC: 15 MG/DL — SIGNIFICANT CHANGE UP (ref 7–18)
CALCIUM SERPL-MCNC: 9.6 MG/DL — SIGNIFICANT CHANGE UP (ref 8.4–10.5)
CHLORIDE SERPL-SCNC: 103 MMOL/L — SIGNIFICANT CHANGE UP (ref 96–108)
CO2 SERPL-SCNC: 26 MMOL/L — SIGNIFICANT CHANGE UP (ref 22–31)
COLOR SPEC: YELLOW — SIGNIFICANT CHANGE UP
CREAT SERPL-MCNC: 1.18 MG/DL — SIGNIFICANT CHANGE UP (ref 0.5–1.3)
DIFF PNL FLD: NEGATIVE — SIGNIFICANT CHANGE UP
EOSINOPHIL # BLD AUTO: 0.12 K/UL — SIGNIFICANT CHANGE UP (ref 0–0.5)
EOSINOPHIL NFR BLD AUTO: 1.1 % — SIGNIFICANT CHANGE UP (ref 0–6)
GLUCOSE SERPL-MCNC: 141 MG/DL — HIGH (ref 70–99)
GLUCOSE UR QL: NEGATIVE — SIGNIFICANT CHANGE UP
HCT VFR BLD CALC: 42.1 % — SIGNIFICANT CHANGE UP (ref 39–50)
HGB BLD-MCNC: 13.4 G/DL — SIGNIFICANT CHANGE UP (ref 13–17)
IMM GRANULOCYTES NFR BLD AUTO: 0.4 % — SIGNIFICANT CHANGE UP (ref 0–1.5)
KETONES UR-MCNC: NEGATIVE — SIGNIFICANT CHANGE UP
LEUKOCYTE ESTERASE UR-ACNC: NEGATIVE — SIGNIFICANT CHANGE UP
LIDOCAIN IGE QN: 309 U/L — SIGNIFICANT CHANGE UP (ref 73–393)
LYMPHOCYTES # BLD AUTO: 0.76 K/UL — LOW (ref 1–3.3)
LYMPHOCYTES # BLD AUTO: 7.2 % — LOW (ref 13–44)
MCHC RBC-ENTMCNC: 29.3 PG — SIGNIFICANT CHANGE UP (ref 27–34)
MCHC RBC-ENTMCNC: 31.8 GM/DL — LOW (ref 32–36)
MCV RBC AUTO: 91.9 FL — SIGNIFICANT CHANGE UP (ref 80–100)
MONOCYTES # BLD AUTO: 0.4 K/UL — SIGNIFICANT CHANGE UP (ref 0–0.9)
MONOCYTES NFR BLD AUTO: 3.8 % — SIGNIFICANT CHANGE UP (ref 2–14)
NEUTROPHILS # BLD AUTO: 9.27 K/UL — HIGH (ref 1.8–7.4)
NEUTROPHILS NFR BLD AUTO: 87.2 % — HIGH (ref 43–77)
NITRITE UR-MCNC: NEGATIVE — SIGNIFICANT CHANGE UP
NRBC # BLD: 0 /100 WBCS — SIGNIFICANT CHANGE UP (ref 0–0)
PH UR: 7 — SIGNIFICANT CHANGE UP (ref 5–8)
PLATELET # BLD AUTO: 260 K/UL — SIGNIFICANT CHANGE UP (ref 150–400)
POTASSIUM SERPL-MCNC: 3.6 MMOL/L — SIGNIFICANT CHANGE UP (ref 3.5–5.3)
POTASSIUM SERPL-SCNC: 3.6 MMOL/L — SIGNIFICANT CHANGE UP (ref 3.5–5.3)
PROT SERPL-MCNC: 7.7 G/DL — SIGNIFICANT CHANGE UP (ref 6–8.3)
PROT UR-MCNC: NEGATIVE — SIGNIFICANT CHANGE UP
RBC # BLD: 4.58 M/UL — SIGNIFICANT CHANGE UP (ref 4.2–5.8)
RBC # FLD: 14.6 % — HIGH (ref 10.3–14.5)
SODIUM SERPL-SCNC: 138 MMOL/L — SIGNIFICANT CHANGE UP (ref 135–145)
SP GR SPEC: 1 — LOW (ref 1.01–1.02)
TROPONIN I SERPL-MCNC: <0.015 NG/ML — SIGNIFICANT CHANGE UP (ref 0–0.04)
UROBILINOGEN FLD QL: NEGATIVE — SIGNIFICANT CHANGE UP
WBC # BLD: 10.62 K/UL — HIGH (ref 3.8–10.5)
WBC # FLD AUTO: 10.62 K/UL — HIGH (ref 3.8–10.5)

## 2021-01-04 PROCEDURE — 71045 X-RAY EXAM CHEST 1 VIEW: CPT | Mod: 26

## 2021-01-04 PROCEDURE — 70450 CT HEAD/BRAIN W/O DYE: CPT | Mod: 26

## 2021-01-04 PROCEDURE — 86901 BLOOD TYPING SEROLOGIC RH(D): CPT

## 2021-01-04 PROCEDURE — 36415 COLL VENOUS BLD VENIPUNCTURE: CPT

## 2021-01-04 PROCEDURE — 83690 ASSAY OF LIPASE: CPT

## 2021-01-04 PROCEDURE — 84484 ASSAY OF TROPONIN QUANT: CPT

## 2021-01-04 PROCEDURE — 71045 X-RAY EXAM CHEST 1 VIEW: CPT

## 2021-01-04 PROCEDURE — 86900 BLOOD TYPING SEROLOGIC ABO: CPT

## 2021-01-04 PROCEDURE — 81003 URINALYSIS AUTO W/O SCOPE: CPT

## 2021-01-04 PROCEDURE — 93005 ELECTROCARDIOGRAM TRACING: CPT

## 2021-01-04 PROCEDURE — 70450 CT HEAD/BRAIN W/O DYE: CPT

## 2021-01-04 PROCEDURE — 85025 COMPLETE CBC W/AUTO DIFF WBC: CPT

## 2021-01-04 PROCEDURE — 74176 CT ABD & PELVIS W/O CONTRAST: CPT

## 2021-01-04 PROCEDURE — 96374 THER/PROPH/DIAG INJ IV PUSH: CPT

## 2021-01-04 PROCEDURE — 86850 RBC ANTIBODY SCREEN: CPT

## 2021-01-04 PROCEDURE — 87086 URINE CULTURE/COLONY COUNT: CPT

## 2021-01-04 PROCEDURE — 80053 COMPREHEN METABOLIC PANEL: CPT

## 2021-01-04 PROCEDURE — 96361 HYDRATE IV INFUSION ADD-ON: CPT

## 2021-01-04 PROCEDURE — 96375 TX/PRO/DX INJ NEW DRUG ADDON: CPT

## 2021-01-04 PROCEDURE — 99284 EMERGENCY DEPT VISIT MOD MDM: CPT | Mod: 25

## 2021-01-04 PROCEDURE — 74176 CT ABD & PELVIS W/O CONTRAST: CPT | Mod: 26

## 2021-01-04 RX ORDER — KETOROLAC TROMETHAMINE 30 MG/ML
15 SYRINGE (ML) INJECTION ONCE
Refills: 0 | Status: DISCONTINUED | OUTPATIENT
Start: 2021-01-04 | End: 2021-01-04

## 2021-01-04 RX ORDER — ONDANSETRON 8 MG/1
4 TABLET, FILM COATED ORAL ONCE
Refills: 0 | Status: COMPLETED | OUTPATIENT
Start: 2021-01-04 | End: 2021-01-04

## 2021-01-04 RX ORDER — SODIUM CHLORIDE 9 MG/ML
1000 INJECTION INTRAMUSCULAR; INTRAVENOUS; SUBCUTANEOUS ONCE
Refills: 0 | Status: COMPLETED | OUTPATIENT
Start: 2021-01-04 | End: 2021-01-04

## 2021-01-04 RX ADMIN — Medication 15 MILLIGRAM(S): at 03:45

## 2021-01-04 RX ADMIN — ONDANSETRON 4 MILLIGRAM(S): 8 TABLET, FILM COATED ORAL at 01:00

## 2021-01-04 RX ADMIN — SODIUM CHLORIDE 1000 MILLILITER(S): 9 INJECTION INTRAMUSCULAR; INTRAVENOUS; SUBCUTANEOUS at 01:01

## 2021-01-04 RX ADMIN — SODIUM CHLORIDE 1000 MILLILITER(S): 9 INJECTION INTRAMUSCULAR; INTRAVENOUS; SUBCUTANEOUS at 03:45

## 2021-01-04 NOTE — ED PROVIDER NOTE - NSFOLLOWUPINSTRUCTIONS_ED_ALL_ED_FT
Take tylenol 650mg every 6 hours for headache.    General Headache    WHAT YOU NEED TO KNOW:    Headache pain may be mild or severe. Common causes include stress, medicines, and head injuries. Sleep problems, allergies, and hormone changes can also cause a headache. You may have frequent headaches that have no clear cause. Pain may start in another part of your body and move to your head. Headache pain can also move to other parts of your body. A headache can cause other symptoms, such as nausea and vomiting. A severe headache may be a sign of a stroke or other serious problem that needs immediate treatment.    DISCHARGE INSTRUCTIONS:    Call 911 for any of the following:   •You have any of the following signs of a stroke: ?Numbness or drooping on one side of your face       ?Weakness in an arm or leg      ?Confusion or difficulty speaking      ?Dizziness, a severe headache, or vision loss          Return to the emergency department if:   •You have a headache with neck stiffness and a fever.      •You have a constant headache and are vomiting.      •You have severe pain that does not get better after you take pain medicine.      •You have a headache and the pain worsens when you look into light.      •You have a headache and vision changes, such as blurred vision.      •You have a headache and are forgetful or confused.      Contact your healthcare provider if:   •You have a headache each day that does not get better, even after treatment.      •You have changes in your headaches, or new symptoms that occur when you have a headache.      •Others you live or work with also have headaches.      •You have questions or concerns about your condition or care.      Medicines: You may need any of the following:   •Medicines may be given to prevent or treat headache pain. Do not wait until the pain is severe to take your medicine. Ask your healthcare provider how to take the medicine safely.       •NSAIDs, such as ibuprofen, help decrease swelling, pain, and fever. This medicine is available with or without a doctor's order. NSAIDs can cause stomach bleeding or kidney problems in certain people. If you take blood thinner medicine, always ask if NSAIDs are safe for you. Always read the medicine label and follow directions. Do not give these medicines to children under 6 months of age without direction from your child's healthcare provider.      •Acetaminophen decreases pain and fever. It is available without a doctor's order. Ask how much to take and how often to take it. Follow directions. Read the labels of all other medicines you are using to see if they also contain acetaminophen, or ask your doctor or pharmacist. Acetaminophen can cause liver damage if not taken correctly. Do not use more than 4 grams (4,000 milligrams) total of acetaminophen in one day.       •Antinausea medicine may be given to calm your stomach and help prevent vomiting.      •Take your medicine as directed. Contact your healthcare provider if you think your medicine is not helping or if you have side effects. Tell him of her if you are allergic to any medicine. Keep a list of the medicines, vitamins, and herbs you take. Include the amounts, and when and why you take them. Bring the list or the pill bottles to follow-up visits. Carry your medicine list with you in case of an emergency.      Manage your symptoms:   •Rest in a dark and quiet room. This may help decrease your pain.      •Apply heat or ice as directed. Heat or ice may help decrease pain or muscle spasms. Apply heat or ice on the area for 20 minutes every 2 hours for as many days as directed. Your healthcare provider may recommend that you alternate heat and ice.      •Relax your muscles to help relieve a headache. Lie down in a comfortable position and close your eyes. Relax your muscles slowly. Start at your toes and work your way up your body. A massage or warm bath may also help relax your muscles.      Keep a headache record: Record the dates and times that you get headaches, and what you were doing before the headache started. Also record what you ate and drank in the 24 hours before the headache started. This might help your healthcare provider find the cause of your headaches and make a treatment plan. The record can also help you avoid headache triggers or manage your symptoms.    Get enough sleep: You should get 8 to 10 hours of sleep each night. Create a sleep schedule. Go to bed and wake up at the same times each day. It may be helpful to do something relaxing before bed. Do not watch television right before bed.    Do not smoke: Nicotine and other chemicals in cigarettes and cigars can trigger a headache or make it worse. Ask your healthcare provider for information if you currently smoke and need help to quit. E-cigarettes or smokeless tobacco still contain nicotine. Talk to your healthcare provider before you use these products.     Drink liquids as directed: You may need to drink more liquid to prevent dehydration. Dehydration can cause a headache. Ask your healthcare provider how much liquid to drink each day and which liquids are best for you.     Limit caffeine and alcohol as directed: Your headaches may be triggered by caffeine or alcohol. You may also develop a headache if you drink caffeine regularly and suddenly stop.    Eat a variety of healthy foods: Do not skip meals. Too little food can trigger a headache. Include fruits, vegetables, whole-grain breads, low-fat dairy products, beans, lean meat, and fish. Do not have trigger foods, such as chocolate and red wine. Foods that contain gluten, nitrates, MSG, or artificial sweeteners may also trigger a headache.    Follow up with your healthcare provider as directed: Write down your questions so you remember to ask them during your visits.

## 2021-01-04 NOTE — ED ADULT NURSE NOTE - OBJECTIVE STATEMENT
vomiting on and off and dizziness for a week then today severe headache denies syncope or recent travel or diarrhea

## 2021-01-04 NOTE — ED PROVIDER NOTE - CLINICAL SUMMARY MEDICAL DECISION MAKING FREE TEXT BOX
87 year old male with hypertension and hyperlipidemia presents to the ED with complaints of headache, dizziness, and vomiting. Will check EKG, labs, chest x-ray, CT head, and CT abdomen. Will reassess. 87 year old male with hypertension and hyperlipidemia presents to the ED with complaints of headache, dizziness, and vomiting. Will check EKG, labs, chest x-ray, CT head, and CT abdomen. Will reassess.    labs unremarkable, CXR shows left lower lobe atelectasis  CT head unremarkable, CT A/P shows moderate hiatal hernia, no evidence of obstruction  discussed above with patient. On reeval patient sleeping comfortably without recurrence of vomiting. patient stable for discharge to [facility].

## 2021-01-04 NOTE — ED PROVIDER NOTE - OBJECTIVE STATEMENT
87 year old male with PMHx of hypertension, hyperlipidemia. glaucoma, GERD, BPH, hypothyroidism, and prostate cancer and no significant PSHx sent into the ED from his assisted living facility for evaluation of a headache and dizziness. Patient reports having reflux-like symptoms including vomiting over the last couple of days. Patient states that tonight he then developed some dizziness and a headache, causing him to be sent to the ED for evaluation. In the ED, patient endorses that his symptoms have improved. Patient otherwise denies any focal numbness or weakness. Patient notes that he has developed similar headaches and dizziness in the past since suffering a traumatic head injury a few years ago. NKDA.

## 2021-01-04 NOTE — ED ADULT NURSE NOTE - ED STAT RN HANDOFF DETAILS
pt.remained  stable.  denies pain. re-eval by  with order to back to [facility].awaiting transportation pt.remained  stable.  denies pain. re-eval by  with order to back to NH.awaiting transportation.endorsed to rn irish

## 2021-01-04 NOTE — ED PROVIDER NOTE - PATIENT PORTAL LINK FT
You can access the FollowMyHealth Patient Portal offered by [facility] by registering at the following website: [email or web address]. By joining [employer]’s FollowMyHealth portal, you will also be able to view your health information using other applications (apps) compatible with our system.

## 2021-01-05 LAB
CULTURE RESULTS: SIGNIFICANT CHANGE UP
SPECIMEN SOURCE: SIGNIFICANT CHANGE UP

## 2021-07-20 NOTE — ED ADULT TRIAGE NOTE - CADM TRG TX PRIOR TO ARRIVAL
none
Detail Level: Detailed
Initiate Treatment: Take 1 Spironolactone PO QHS and apply Kalaron lotion prior to moisturizer in the mornings
Plan: Recommended OTC Differin gel to use nightly

## 2021-10-20 ENCOUNTER — EMERGENCY (EMERGENCY)
Facility: HOSPITAL | Age: 86
LOS: 1 days | Discharge: HOME | End: 2021-10-20
Attending: EMERGENCY MEDICINE
Payer: MEDICARE

## 2021-10-20 VITALS
WEIGHT: 160.06 LBS | TEMPERATURE: 98 F | HEART RATE: 93 BPM | RESPIRATION RATE: 16 BRPM | DIASTOLIC BLOOD PRESSURE: 79 MMHG | HEIGHT: 67 IN | SYSTOLIC BLOOD PRESSURE: 137 MMHG | OXYGEN SATURATION: 97 %

## 2021-10-20 PROBLEM — N40.0 BENIGN PROSTATIC HYPERPLASIA WITHOUT LOWER URINARY TRACT SYMPTOMS: Chronic | Status: ACTIVE | Noted: 2021-01-04

## 2021-10-20 PROBLEM — H40.9 UNSPECIFIED GLAUCOMA: Chronic | Status: ACTIVE | Noted: 2021-01-04

## 2021-10-20 PROBLEM — K21.9 GASTRO-ESOPHAGEAL REFLUX DISEASE WITHOUT ESOPHAGITIS: Chronic | Status: ACTIVE | Noted: 2021-01-04

## 2021-10-20 PROBLEM — C61 MALIGNANT NEOPLASM OF PROSTATE: Chronic | Status: ACTIVE | Noted: 2021-01-04

## 2021-10-20 PROCEDURE — 70450 CT HEAD/BRAIN W/O DYE: CPT | Mod: 26,MA

## 2021-10-20 PROCEDURE — 73590 X-RAY EXAM OF LOWER LEG: CPT | Mod: 26,LT

## 2021-10-20 PROCEDURE — 99284 EMERGENCY DEPT VISIT MOD MDM: CPT

## 2021-10-20 PROCEDURE — 93005 ELECTROCARDIOGRAM TRACING: CPT

## 2021-10-20 PROCEDURE — 70450 CT HEAD/BRAIN W/O DYE: CPT | Mod: MA

## 2021-10-20 PROCEDURE — 72125 CT NECK SPINE W/O DYE: CPT | Mod: MA

## 2021-10-20 PROCEDURE — 93010 ELECTROCARDIOGRAM REPORT: CPT

## 2021-10-20 PROCEDURE — 99284 EMERGENCY DEPT VISIT MOD MDM: CPT | Mod: 25

## 2021-10-20 PROCEDURE — 73590 X-RAY EXAM OF LOWER LEG: CPT

## 2021-10-20 PROCEDURE — 90714 TD VACC NO PRESV 7 YRS+ IM: CPT

## 2021-10-20 PROCEDURE — 90471 IMMUNIZATION ADMIN: CPT

## 2021-10-20 PROCEDURE — 72125 CT NECK SPINE W/O DYE: CPT | Mod: 26,MA

## 2021-10-20 RX ORDER — ACETAMINOPHEN 500 MG
650 TABLET ORAL ONCE
Refills: 0 | Status: COMPLETED | OUTPATIENT
Start: 2021-10-20 | End: 2021-10-20

## 2021-10-20 RX ORDER — TETANUS AND DIPHTHERIA TOXOIDS ADSORBED 2; 2 [LF]/.5ML; [LF]/.5ML
0.5 INJECTION INTRAMUSCULAR ONCE
Refills: 0 | Status: COMPLETED | OUTPATIENT
Start: 2021-10-20 | End: 2021-10-20

## 2021-10-20 RX ADMIN — Medication 650 MILLIGRAM(S): at 19:31

## 2021-10-20 RX ADMIN — Medication 650 MILLIGRAM(S): at 20:01

## 2021-10-20 RX ADMIN — TETANUS AND DIPHTHERIA TOXOIDS ADSORBED 0.5 MILLILITER(S): 2; 2 INJECTION INTRAMUSCULAR at 19:29

## 2021-10-20 NOTE — ED ADULT NURSE NOTE - NSICDXPASTMEDICALHX_GEN_ALL_CORE_FT
PAST MEDICAL HISTORY:  BPH (benign prostatic hyperplasia)     GERD (gastroesophageal reflux disease)     Glaucoma     HLD (hyperlipidemia)     HTN (hypertension)     Hypothyroidism     Prostate cancer

## 2021-10-20 NOTE — ED ADULT TRIAGE NOTE - CHIEF COMPLAINT QUOTE
slip and fall after coming out of shower, laceration on nose and forehead. having headache, neck pain and left leg pain. Denies any LOC.

## 2021-10-20 NOTE — ED PROVIDER NOTE - MUSCULOSKELETAL, MLM
Spine appears normal, range of motion is not limited, RLE- ant lower 1/3-mild hematoma with swelling, FROM

## 2021-10-20 NOTE — ED ADULT NURSE NOTE - OBJECTIVE STATEMENT
Pt aox3, aox2, c/o slip/fall while coming out of shower, p/w laceration to forehead and nose. Pt denies LOC. Pt also reports left lower leg pain.

## 2021-10-20 NOTE — ED ADULT NURSE NOTE - NSIMPLEMENTINTERV_GEN_ALL_ED
Implemented All Fall with Harm Risk Interventions:  Liberal to call system. Call bell, personal items and telephone within reach. Instruct patient to call for assistance. Room bathroom lighting operational. Non-slip footwear when patient is off stretcher. Physically safe environment: no spills, clutter or unnecessary equipment. Stretcher in lowest position, wheels locked, appropriate side rails in place. Provide visual cue, wrist band, yellow gown, etc. Monitor gait and stability. Monitor for mental status changes and reorient to person, place, and time. Review medications for side effects contributing to fall risk. Reinforce activity limits and safety measures with patient and family. Provide visual clues: red socks.

## 2021-10-20 NOTE — ED PROVIDER NOTE - PHYSICAL EXAMINATION
mid forehead- mid forehead- 2 cm lac, no active bleeding  nose bridge-mild swelling, skin avulsion with 1/2 cm superficial lac, no active bleeding

## 2021-10-20 NOTE — ED ADULT TRIAGE NOTE - NS ED NURSE AMBULANCES
[facility]
Breasts of normal contour, size, color and symmetry, without milk, signs of inflammation or tenderness; nipples with normal size, shape, number and spacing.  Axillary exam normal.

## 2021-10-20 NOTE — ED PROVIDER NOTE - PATIENT PORTAL LINK FT
You can access the FollowMyHealth Patient Portal offered by [facility] by registering at the following website: [email or web address]. By joining [employer]’s FollowMyHealth portal, you will also be able to view your health information using other applications (apps) compatible with our system.

## 2021-10-20 NOTE — ED PROVIDER NOTE - OBJECTIVE STATEMENT
89 y/o male with PMHx GERD, HTN, HLD, BPH presents to ED c/o fall. Patient states was in bathtub at [facility] and fell out onto floor and couldn't get up. Patient denies LOC but states is in a lot of pain in L leg. Patient notes mild headache. Patient denies dizziness or nausea. Patient questionable tetanus vaccine. NKDA.

## 2021-10-21 VITALS
OXYGEN SATURATION: 96 % | TEMPERATURE: 98 F | SYSTOLIC BLOOD PRESSURE: 145 MMHG | RESPIRATION RATE: 18 BRPM | HEART RATE: 86 BPM | DIASTOLIC BLOOD PRESSURE: 70 MMHG

## 2022-04-29 NOTE — ED ADULT TRIAGE NOTE - ACCOMPANIED BY
----- Message from Gwen Dye MA sent at 4/29/2022 12:50 PM CDT -----    ----- Message -----  From: Leandro Silva  Sent: 4/29/2022  12:13 PM CDT  To: [facility] Staff    Type:  Patient Returning Call         Who Called: LEIF FUENTES [[id number]]         Who Left Message for Patient: VEGA Dover         Does the patient know what this is regarding? Yes         Best Call Back Number:[phone]         Additional Information:       
Attempted to reach patient. VM left to call office. Jessica Dover, ARETHAP-BC    
EMT/paramedic

## 2023-03-07 NOTE — ED ADULT TRIAGE NOTE - DIRECT TO ROOM CARE INITIATED:
10-Apr-2020 14:58 Consent was obtained from the patient. The risks and benefits to therapy were discussed in detail. Specifically, the risks of infection, scarring, bleeding, prolonged wound healing, incomplete removal, allergy to anesthesia, nerve injury and recurrence were addressed. Prior to the procedure, the treatment site was clearly identified and confirmed by the patient. All components of Universal Protocol/PAUSE Rule completed.

## 2023-03-28 ENCOUNTER — INPATIENT (INPATIENT)
Facility: HOSPITAL | Age: 88
LOS: 8 days | Discharge: NURSING, REHAB OR LONG-TERM CARE | DRG: 872 | End: 2023-04-06
Attending: INTERNAL MEDICINE | Admitting: INTERNAL MEDICINE
Payer: MEDICARE

## 2023-03-28 VITALS
TEMPERATURE: 98 F | DIASTOLIC BLOOD PRESSURE: 78 MMHG | RESPIRATION RATE: 17 BRPM | SYSTOLIC BLOOD PRESSURE: 154 MMHG | HEART RATE: 74 BPM | OXYGEN SATURATION: 96 %

## 2023-03-28 DIAGNOSIS — I10 ESSENTIAL (PRIMARY) HYPERTENSION: ICD-10-CM

## 2023-03-28 DIAGNOSIS — N39.0 URINARY TRACT INFECTION, SITE NOT SPECIFIED: ICD-10-CM

## 2023-03-28 DIAGNOSIS — E78.5 HYPERLIPIDEMIA, UNSPECIFIED: ICD-10-CM

## 2023-03-28 DIAGNOSIS — E03.9 HYPOTHYROIDISM, UNSPECIFIED: ICD-10-CM

## 2023-03-28 DIAGNOSIS — H40.9 UNSPECIFIED GLAUCOMA: ICD-10-CM

## 2023-03-28 DIAGNOSIS — C61 MALIGNANT NEOPLASM OF PROSTATE: ICD-10-CM

## 2023-03-28 DIAGNOSIS — R13.10 DYSPHAGIA, UNSPECIFIED: ICD-10-CM

## 2023-03-28 DIAGNOSIS — Z29.9 ENCOUNTER FOR PROPHYLACTIC MEASURES, UNSPECIFIED: ICD-10-CM

## 2023-03-28 DIAGNOSIS — N40.0 BENIGN PROSTATIC HYPERPLASIA WITHOUT LOWER URINARY TRACT SYMPTOMS: ICD-10-CM

## 2023-03-28 DIAGNOSIS — K21.9 GASTRO-ESOPHAGEAL REFLUX DISEASE WITHOUT ESOPHAGITIS: ICD-10-CM

## 2023-03-28 PROCEDURE — 99285 EMERGENCY DEPT VISIT HI MDM: CPT

## 2023-03-28 PROCEDURE — 71045 X-RAY EXAM CHEST 1 VIEW: CPT | Mod: 26

## 2023-03-28 RX ORDER — ASCORBIC ACID 60 MG
500 TABLET,CHEWABLE ORAL DAILY
Refills: 0 | Status: DISCONTINUED | OUTPATIENT
Start: 2023-03-28 | End: 2023-04-06

## 2023-03-28 RX ORDER — TAMSULOSIN HYDROCHLORIDE 0.4 MG/1
0.4 CAPSULE ORAL AT BEDTIME
Refills: 0 | Status: DISCONTINUED | OUTPATIENT
Start: 2023-03-28 | End: 2023-04-06

## 2023-03-28 RX ORDER — FINASTERIDE 5 MG/1
5 TABLET, FILM COATED ORAL DAILY
Refills: 0 | Status: DISCONTINUED | OUTPATIENT
Start: 2023-03-28 | End: 2023-04-06

## 2023-03-28 RX ORDER — SODIUM CHLORIDE 9 MG/ML
1000 INJECTION, SOLUTION INTRAVENOUS
Refills: 0 | Status: DISCONTINUED | OUTPATIENT
Start: 2023-03-28 | End: 2023-04-06

## 2023-03-28 RX ORDER — METOPROLOL TARTRATE 50 MG
25 TABLET ORAL DAILY
Refills: 0 | Status: DISCONTINUED | OUTPATIENT
Start: 2023-03-28 | End: 2023-04-06

## 2023-03-28 RX ORDER — MONTELUKAST 4 MG/1
1 TABLET, CHEWABLE ORAL
Qty: 0 | Refills: 0 | DISCHARGE

## 2023-03-28 RX ORDER — ASPIRIN/CALCIUM CARB/MAGNESIUM 324 MG
81 TABLET ORAL DAILY
Refills: 0 | Status: DISCONTINUED | OUTPATIENT
Start: 2023-03-28 | End: 2023-04-06

## 2023-03-28 RX ORDER — CEFTRIAXONE 500 MG/1
1000 INJECTION, POWDER, FOR SOLUTION INTRAMUSCULAR; INTRAVENOUS ONCE
Refills: 0 | Status: COMPLETED | OUTPATIENT
Start: 2023-03-28 | End: 2023-03-28

## 2023-03-28 RX ORDER — MEROPENEM 1 G/30ML
1000 INJECTION INTRAVENOUS EVERY 8 HOURS
Refills: 0 | Status: DISCONTINUED | OUTPATIENT
Start: 2023-03-29 | End: 2023-03-29

## 2023-03-28 RX ORDER — SODIUM CHLORIDE 9 MG/ML
1000 INJECTION INTRAMUSCULAR; INTRAVENOUS; SUBCUTANEOUS ONCE
Refills: 0 | Status: COMPLETED | OUTPATIENT
Start: 2023-03-28 | End: 2023-03-28

## 2023-03-28 RX ORDER — OMEPRAZOLE 10 MG/1
1 CAPSULE, DELAYED RELEASE ORAL
Qty: 0 | Refills: 0 | DISCHARGE

## 2023-03-28 RX ORDER — AZITHROMYCIN 500 MG/1
500 TABLET, FILM COATED ORAL ONCE
Refills: 0 | Status: COMPLETED | OUTPATIENT
Start: 2023-03-28 | End: 2023-03-28

## 2023-03-28 RX ORDER — PANTOPRAZOLE SODIUM 20 MG/1
40 TABLET, DELAYED RELEASE ORAL
Refills: 0 | Status: DISCONTINUED | OUTPATIENT
Start: 2023-03-28 | End: 2023-04-06

## 2023-03-28 RX ORDER — SIMVASTATIN 20 MG/1
20 TABLET, FILM COATED ORAL AT BEDTIME
Refills: 0 | Status: DISCONTINUED | OUTPATIENT
Start: 2023-03-28 | End: 2023-04-06

## 2023-03-28 RX ORDER — LEVOTHYROXINE SODIUM 125 MCG
25 TABLET ORAL DAILY
Refills: 0 | Status: DISCONTINUED | OUTPATIENT
Start: 2023-03-28 | End: 2023-04-06

## 2023-03-28 RX ORDER — LORATADINE 10 MG/1
1 TABLET ORAL
Qty: 0 | Refills: 0 | DISCHARGE

## 2023-03-28 RX ORDER — LATANOPROST 0.05 MG/ML
1 SOLUTION/ DROPS OPHTHALMIC; TOPICAL AT BEDTIME
Refills: 0 | Status: DISCONTINUED | OUTPATIENT
Start: 2023-03-28 | End: 2023-04-06

## 2023-03-28 RX ORDER — HEPARIN SODIUM 5000 [USP'U]/ML
5000 INJECTION INTRAVENOUS; SUBCUTANEOUS EVERY 12 HOURS
Refills: 0 | Status: DISCONTINUED | OUTPATIENT
Start: 2023-03-28 | End: 2023-04-06

## 2023-03-28 RX ADMIN — SODIUM CHLORIDE 75 MILLILITER(S): 9 INJECTION, SOLUTION INTRAVENOUS at 22:58

## 2023-03-28 RX ADMIN — LATANOPROST 1 DROP(S): 0.05 SOLUTION/ DROPS OPHTHALMIC; TOPICAL at 23:26

## 2023-03-28 RX ADMIN — TAMSULOSIN HYDROCHLORIDE 0.4 MILLIGRAM(S): 0.4 CAPSULE ORAL at 22:57

## 2023-03-28 RX ADMIN — SODIUM CHLORIDE 1000 MILLILITER(S): 9 INJECTION INTRAMUSCULAR; INTRAVENOUS; SUBCUTANEOUS at 15:57

## 2023-03-28 RX ADMIN — SIMVASTATIN 20 MILLIGRAM(S): 20 TABLET, FILM COATED ORAL at 22:57

## 2023-03-28 RX ADMIN — SODIUM CHLORIDE 1000 MILLILITER(S): 9 INJECTION INTRAMUSCULAR; INTRAVENOUS; SUBCUTANEOUS at 16:57

## 2023-03-28 NOTE — ED ADULT NURSE NOTE - CHIEF COMPLAINT
The patient is a 89y Male complaining of 
no anemia, no easy bruising, no jaundice, no swollen lymph nodes.

## 2023-03-28 NOTE — H&P ADULT - HISTORY OF PRESENT ILLNESS
87 year old male from [facility] with PMHx of hypertension, hyperlipidemia. glaucoma, GERD, BPH, hypothyroidism, and prostate cancer, baseline A&Ox1,  The blood counts, liver, kidney and CRP inflammation labs are normal.     Sin GUZMAN, CNP, MSN  11/20/2018  2:11 PM       87 year old male from [facility] with PMHx of hypertension, hyperlipidemia. glaucoma, GERD, BPH, hypothyroidism, and prostate cancer, baseline A&Ox1, presenting from NH after difficulty swallowing for x4 weeks. Pt. denies difficulty swallowing during exam, and denies any chest pain, palpitations, headache, urinary discomfort abdominal pain, fever, chills. Only oriented to self, thinks he is in "[facility]", and said the year was "3000". Pt is not a good historian, was unable to get in contact with HCP Ene Serrano. Was admitted in the past for ESBL E.Coli bacteremia in 4/2020, stay complicated by COVID infection, most recent ED trip s/p mechanical fall 10/2021.     ED vitals:154/78 BP, 17 RR, 74 HR, 98.1 F  Labs: WC 19.83, BMP wnl, UA pos nitrites, leuk esterase, 6-10 WBC, 25-50 RBCs   BCx and UCx pending  Barium swallow Xray pending

## 2023-03-28 NOTE — H&P ADULT - ASSESSMENT
87 year old male from [facility] with PMHx of hypertension, hyperlipidemia. glaucoma, GERD, BPH, hypothyroidism, and prostate cancer, baseline A&Ox1, presenting from NH after difficulty swallowing for x4 weeks. Was admitted in the past for ESBL E.Coli bacteremia in 4/2020. ED vital signs are stable, afebrile. Elevated WC to 19.83, BMP wnl, UA pos nitrites, leuk esterase, 6-10 WBC, 25-50 RBCs, lactate, trop, and CK negative. UCx and BCx pending. BCx and UCx pending. Admitted to the floors for dysphagia w/u and treatment of UTI.

## 2023-03-28 NOTE — ED PROVIDER NOTE - OBJECTIVE STATEMENT
89 yr old male from [facility] with hx of H.O.H., Prostate Ca, HTN, HLD, unsteady gait presents to ed for difficulty swallowing x 4 wks per triage. pt not a good historian. asking for water.

## 2023-03-28 NOTE — H&P ADULT - ATTENDING COMMENTS
87 year old male from [facility] with PMHx of hypertension, hyperlipidemia. glaucoma, GERD, BPH, hypothyroidism, and prostate cancer, baseline A&Ox1, presenting from NH after difficulty swallowing for x4 weeks. Pt. denies difficulty swallowing during exam, and denies any chest pain, palpitations, headache, urinary discomfort abdominal pain, fever, chills. Only oriented to self, thinks he is in "[facility]", and said the year was "3000". Pt is not a good historian, was unable to get in contact with HCP Ene Serrano. Was admitted in the past for ESBL E.Coli bacteremia in 4/2020, stay complicated by COVID infection, most recent ED trip s/p mechanical fall 10/2021.     ED vitals:154/78 BP, 17 RR, 74 HR, 98.1 F  Labs: WC 19.83, BMP wnl, UA pos nitrites, leuk esterase, 6-10 WBC, 25-50 RBCs   BCx and UCx pending  Barium swallow Xray pending     # SEPSIS S/T SUSPECTED UTI, HX OF ESBL ECOLI UTI  - PLACED ON MEROPENEM, F/U BCX AND UCX  - ID CONSULT    # DYSPHAGIA  - F/U ST EVAL  - F/U BARIUM ESOPHAGRAM  - GI CONSULT    # HTN  # HLD  # GERD  # BPH, HX OF PROSTATE CA  # HYPOTHYROIDISM  # GLAUCOMA  # GI AND DVT PPX

## 2023-03-28 NOTE — H&P ADULT - NSHPPHYSICALEXAM_GEN_ALL_CORE
T(C): 36.8 (03-28-23 @ 19:00), Max: 36.8 (03-28-23 @ 19:00)  HR: 100 (03-28-23 @ 19:00) (74 - 100)  BP: 133/72 (03-28-23 @ 19:00) (133/72 - 154/78)  RR: 17 (03-28-23 @ 19:00) (17 - 17)  SpO2: 97% (03-28-23 @ 19:00) (96% - 97%)    CONSTITUTIONAL: Well groomed, no apparent distress  EYES: PERRLA and symmetric, EOMI, No conjunctival or scleral injection, non-icteric  ENMT: (+) Oral mucosa with dry mucous membranes. Normal dentition; no pharyngeal injection or exudates  RESP: No respiratory distress, no use of accessory muscles; CTA b/l, no WRR  CV: RRR, +S1S2, no MRG; no JVD; no peripheral edema  GI: Soft, NT, ND, no rebound, no guarding; no palpable masses; no hepatosplenomegaly; no hernia palpated  LYMPH: No cervical LAD or tenderness; no axillary LAD or tenderness; no inguinal LAD or tenderness  MSK: Normal gait; No digital clubbing or cyanosis; examination of the (head/neck/spine/ribs/pelvis, RUE, LUE, RLE, LLE) without misalignment,            Normal ROM without pain, no spinal tenderness, normal muscle strength/tone  SKIN: No rashes or ulcers noted; no subcutaneous nodules or induration palpable  NEURO: CN II-XII intact; normal reflexes in upper and lower extremities, sensation intact in upper and lower extremities b/l to light touch   PSYCH: Appropriate insight/judgment; (+) A+O x 1 oriented to self, mood and affect appropriate, recent/remote memory intact

## 2023-03-28 NOTE — H&P ADULT - PROBLEM SELECTOR PLAN 1
- pt with hx of dysphagia for 4 weeks according to triage  - was unable to contact HCP in order to corroborate the length of dysphagia/whether or not pt is at baseline mental status  - f/u Barium Swallow Xray to check for any signs of obstruction  - Dr. Bebe ARENAS consulted   - f/u Speech and Swallow Eval in the AM   - NPO for now, will start gentle hydration  - f/u w/ HCP in the AM to figure out baseline

## 2023-03-28 NOTE — ED PROVIDER NOTE - CLINICAL SUMMARY MEDICAL DECISION MAKING FREE TEXT BOX
89 yr old male from [facility] with hx of H.O.H., Prostate Ca, HTN, HLD, unsteady gait presents to ed for difficulty swallowing x 4 wks per triage. pt not a good historian. asking for water.     dysphagia- likely structural - clinically not consistent with cva. will get xr to r/o mass- likely will need further work up by Inpatient team. needs speech and swallow as well. labs, cxr, fluids, abx, admit

## 2023-03-28 NOTE — H&P ADULT - PROBLEM SELECTOR PLAN 2
- denies any symptoms, however A&Ox1 (likely baseline), and positive UA for leuk esterase, WC 6-10, RBC 25-20, nitrites   - hx of ESBL E.Coli bacteremia admission 4/2020 treated with 10 day course of zosyn   - f/u UCx and BCx   - s/p ceftriaxone and azithro  - c/w ceftriaxone 1g qd for empiric treatment of UTI - denies any symptoms, however A&Ox1 (likely baseline), and positive UA for leuk esterase, WC 6-10, RBC 25-20, nitrites   - hx of ESBL E.Coli bacteremia admission 4/2020 treated with 10 day course of zosyn   - f/u UCx and BCx   - s/p ceftriaxone and azithro  - start meropenem 1g q8 (starting 3/29) for empiric treatment  - Dr. Calhoun ID consulted

## 2023-03-29 LAB
ALBUMIN SERPL ELPH-MCNC: 2.1 G/DL — LOW (ref 3.5–5)
ALP SERPL-CCNC: 57 U/L — SIGNIFICANT CHANGE UP (ref 40–120)
ALT FLD-CCNC: 13 U/L DA — SIGNIFICANT CHANGE UP (ref 10–60)
ANION GAP SERPL CALC-SCNC: 3 MMOL/L — LOW (ref 5–17)
AST SERPL-CCNC: 18 U/L — SIGNIFICANT CHANGE UP (ref 10–40)
BASOPHILS # BLD AUTO: 0.03 K/UL — SIGNIFICANT CHANGE UP (ref 0–0.2)
BASOPHILS NFR BLD AUTO: 0.2 % — SIGNIFICANT CHANGE UP (ref 0–2)
BILIRUB SERPL-MCNC: 0.4 MG/DL — SIGNIFICANT CHANGE UP (ref 0.2–1.2)
BUN SERPL-MCNC: 12 MG/DL — SIGNIFICANT CHANGE UP (ref 7–18)
CALCIUM SERPL-MCNC: 8.4 MG/DL — SIGNIFICANT CHANGE UP (ref 8.4–10.5)
CHLORIDE SERPL-SCNC: 108 MMOL/L — SIGNIFICANT CHANGE UP (ref 96–108)
CO2 SERPL-SCNC: 28 MMOL/L — SIGNIFICANT CHANGE UP (ref 22–31)
CREAT SERPL-MCNC: 0.92 MG/DL — SIGNIFICANT CHANGE UP (ref 0.5–1.3)
EGFR: 80 ML/MIN/1.73M2 — SIGNIFICANT CHANGE UP
EOSINOPHIL # BLD AUTO: 0.19 K/UL — SIGNIFICANT CHANGE UP (ref 0–0.5)
EOSINOPHIL NFR BLD AUTO: 1.4 % — SIGNIFICANT CHANGE UP (ref 0–6)
GLUCOSE BLDC GLUCOMTR-MCNC: 109 MG/DL — HIGH (ref 70–99)
GLUCOSE SERPL-MCNC: 139 MG/DL — HIGH (ref 70–99)
HCT VFR BLD CALC: 33.5 % — LOW (ref 39–50)
HGB BLD-MCNC: 10.2 G/DL — LOW (ref 13–17)
IMM GRANULOCYTES NFR BLD AUTO: 0.8 % — SIGNIFICANT CHANGE UP (ref 0–0.9)
LYMPHOCYTES # BLD AUTO: 1.01 K/UL — SIGNIFICANT CHANGE UP (ref 1–3.3)
LYMPHOCYTES # BLD AUTO: 7.2 % — LOW (ref 13–44)
MAGNESIUM SERPL-MCNC: 2.3 MG/DL — SIGNIFICANT CHANGE UP (ref 1.6–2.6)
MCHC RBC-ENTMCNC: 26.4 PG — LOW (ref 27–34)
MCHC RBC-ENTMCNC: 30.4 GM/DL — LOW (ref 32–36)
MCV RBC AUTO: 86.8 FL — SIGNIFICANT CHANGE UP (ref 80–100)
MONOCYTES # BLD AUTO: 0.91 K/UL — HIGH (ref 0–0.9)
MONOCYTES NFR BLD AUTO: 6.5 % — SIGNIFICANT CHANGE UP (ref 2–14)
NEUTROPHILS # BLD AUTO: 11.78 K/UL — HIGH (ref 1.8–7.4)
NEUTROPHILS NFR BLD AUTO: 83.9 % — HIGH (ref 43–77)
NRBC # BLD: 0 /100 WBCS — SIGNIFICANT CHANGE UP (ref 0–0)
PHOSPHATE SERPL-MCNC: 2.7 MG/DL — SIGNIFICANT CHANGE UP (ref 2.5–4.5)
PLATELET # BLD AUTO: 369 K/UL — SIGNIFICANT CHANGE UP (ref 150–400)
POTASSIUM SERPL-MCNC: 3.6 MMOL/L — SIGNIFICANT CHANGE UP (ref 3.5–5.3)
POTASSIUM SERPL-SCNC: 3.6 MMOL/L — SIGNIFICANT CHANGE UP (ref 3.5–5.3)
PROT SERPL-MCNC: 6.1 G/DL — SIGNIFICANT CHANGE UP (ref 6–8.3)
RBC # BLD: 3.86 M/UL — LOW (ref 4.2–5.8)
RBC # FLD: 15.9 % — HIGH (ref 10.3–14.5)
SODIUM SERPL-SCNC: 139 MMOL/L — SIGNIFICANT CHANGE UP (ref 135–145)
TSH SERPL-MCNC: 3.28 UU/ML — SIGNIFICANT CHANGE UP (ref 0.34–4.82)
WBC # BLD: 14.03 K/UL — HIGH (ref 3.8–10.5)
WBC # FLD AUTO: 14.03 K/UL — HIGH (ref 3.8–10.5)

## 2023-03-29 PROCEDURE — 74220 X-RAY XM ESOPHAGUS 1CNTRST: CPT | Mod: 26

## 2023-03-29 PROCEDURE — 99222 1ST HOSP IP/OBS MODERATE 55: CPT

## 2023-03-29 RX ORDER — CEFEPIME 1 G/1
INJECTION, POWDER, FOR SOLUTION INTRAMUSCULAR; INTRAVENOUS
Refills: 0 | Status: DISCONTINUED | OUTPATIENT
Start: 2023-03-29 | End: 2023-03-30

## 2023-03-29 RX ORDER — CEFEPIME 1 G/1
1000 INJECTION, POWDER, FOR SOLUTION INTRAMUSCULAR; INTRAVENOUS EVERY 8 HOURS
Refills: 0 | Status: DISCONTINUED | OUTPATIENT
Start: 2023-03-29 | End: 2023-03-30

## 2023-03-29 RX ORDER — CEFEPIME 1 G/1
1000 INJECTION, POWDER, FOR SOLUTION INTRAMUSCULAR; INTRAVENOUS ONCE
Refills: 0 | Status: COMPLETED | OUTPATIENT
Start: 2023-03-29 | End: 2023-03-29

## 2023-03-29 RX ADMIN — HEPARIN SODIUM 5000 UNIT(S): 5000 INJECTION INTRAVENOUS; SUBCUTANEOUS at 06:03

## 2023-03-29 RX ADMIN — Medication 25 MICROGRAM(S): at 06:03

## 2023-03-29 RX ADMIN — CEFEPIME 100 MILLIGRAM(S): 1 INJECTION, POWDER, FOR SOLUTION INTRAMUSCULAR; INTRAVENOUS at 21:56

## 2023-03-29 RX ADMIN — FINASTERIDE 5 MILLIGRAM(S): 5 TABLET, FILM COATED ORAL at 11:34

## 2023-03-29 RX ADMIN — TAMSULOSIN HYDROCHLORIDE 0.4 MILLIGRAM(S): 0.4 CAPSULE ORAL at 21:56

## 2023-03-29 RX ADMIN — HEPARIN SODIUM 5000 UNIT(S): 5000 INJECTION INTRAVENOUS; SUBCUTANEOUS at 17:53

## 2023-03-29 RX ADMIN — PANTOPRAZOLE SODIUM 40 MILLIGRAM(S): 20 TABLET, DELAYED RELEASE ORAL at 06:03

## 2023-03-29 RX ADMIN — MEROPENEM 100 MILLIGRAM(S): 1 INJECTION INTRAVENOUS at 14:56

## 2023-03-29 RX ADMIN — MEROPENEM 100 MILLIGRAM(S): 1 INJECTION INTRAVENOUS at 06:02

## 2023-03-29 RX ADMIN — CEFEPIME 100 MILLIGRAM(S): 1 INJECTION, POWDER, FOR SOLUTION INTRAMUSCULAR; INTRAVENOUS at 17:53

## 2023-03-29 RX ADMIN — LATANOPROST 1 DROP(S): 0.05 SOLUTION/ DROPS OPHTHALMIC; TOPICAL at 21:56

## 2023-03-29 RX ADMIN — Medication 500 MILLIGRAM(S): at 11:34

## 2023-03-29 RX ADMIN — SODIUM CHLORIDE 75 MILLILITER(S): 9 INJECTION, SOLUTION INTRAVENOUS at 11:34

## 2023-03-29 RX ADMIN — Medication 25 MILLIGRAM(S): at 06:03

## 2023-03-29 RX ADMIN — SIMVASTATIN 20 MILLIGRAM(S): 20 TABLET, FILM COATED ORAL at 21:56

## 2023-03-29 RX ADMIN — Medication 81 MILLIGRAM(S): at 11:34

## 2023-03-29 NOTE — PATIENT PROFILE ADULT - CENTRAL VENOUS CATHETER/PICC LINE
Chief Complaint   Patient presents with     Hypertension     Patient is here to follow up on blood pressure.      Jade Garcia LPN at 8:51 AM on 4/20/2018.    
no

## 2023-03-29 NOTE — CONSULT NOTE ADULT - ASSESSMENT
Patient is an 89M with a PMHx of HTn, HLD, glaucoma, GERD, BPH, hypothyroidism, an dprostate CA, baseline A&O x 1, who presented from [facility] with dysphagia x 4 weeks. GI was consulted for dysphagia.    Patient is a poor historian, does not answer questions appropriately, collateral obtained from chart review.   Presented to the ED 10/20/21 s/p fall. Previous admission 4/10/20-4/20/20 for sob, found to have Covid-19 as well as UTI and ESBL e.coli bacteremia s/p Zosyn x 10 days. He endorses thirst otherwise offers no acute complaints, denies abd pain/tenderness.     In the ED, labs notable for: leukocytosis 19.83 with neutrophil dominance 86.8, plt 436, CMP largely unremarkable.     #Dysphagia  #GERD  Unclear if patient has had prior EGD/colo, no report available per chart review nor on [facility]. Hx of GERD. Differentials include stricture vs motility-related dysphagia (achalasia) vs reflux esophagitis vs malignancy vs esophageal diverticula vs others. Patient may benefit from endoscopic evaluation however given advanced age and mental status, will re-evaluate intervention pending esophagram and SLP evaluation prior. Patient is on abx for UTI, pending UCx and BCx. Afebrile.     	- Keep NPO pending SLP eval  	- Esophagram ordered, pending  	- IV Protonix 40mg daily    This note and its recommendations herein are preliminary until such time as cosigned by an attending.    GI will continue to follow.  Thank you for this consult!

## 2023-03-29 NOTE — CONSULT NOTE ADULT - ASSESSMENT
HPI:  87 year old male from [facility] with PMHx of hypertension, hyperlipidemia. glaucoma, GERD, BPH, hypothyroidism, and prostate cancer, baseline A&Ox1, presenting from NH after difficulty swallowing for x4 weeks. Pt. denies difficulty swallowing during exam, and denies any chest pain, palpitations, headache, urinary discomfort abdominal pain, fever, chills. Only oriented to self, thinks he is in "[facility]", and said the year was "3000". Pt is not a good historian, was unable to get in contact with HCP Ene Serrano. Was admitted in the past for ESBL E.Coli bacteremia in 2020, stay complicated by COVID infection, most recent ED trip s/p mechanical fall 10/2021.     ED vitals:154/78 BP, 17 RR, 74 HR, 98.1 F  Labs: WC 19.83, BMP wnl, UA pos nitrites, leuk esterase, 6-10 WBC, 25-50 RBCs   BCx and UCx pending  Barium swallow Xray pending  (28 Mar 2023 19:14)      Allergies: No Known Allergies    PAST MEDICAL & SURGICAL HISTORY:  Hypothyroidism  HTN (hypertension)  HLD (hyperlipidemia)  Glaucoma  GERD (gastroesophageal reflux disease)  BPH (benign prostatic hyperplasia)  Prostate cancer    No significant past surgical history    SOCIAL HISTORY: [ ] smoking [ ] alcohol [ ] IVDU  FAMILY HISTORY:  no pertinent related medical history  Drug Dosing Weight  Height (cm): 170.2 (20 Oct 2021 15:59)  Weight (kg): 72.6 (20 Oct 2021 15:59)  BMI (kg/m2): 25.1 (20 Oct 2021 15:59)  BSA (m2): 1.84 (20 Oct 2021 15:59)  ANTIMICROBIALS:  meropenem  IVPB 1000 every 8 hours    REVIEW OF SYSTEMS:  CONSTITUTIONAL:  No weakness, fevers or chills  EYES/ENT:  No visual changes;  No vertigo or throat pain   NECK:  No pain or stiffness  RESPIRATORY:  No cough, wheezing, hemoptysis; No shortness of breath  CARDIOVASCULAR:  No chest pain or palpitations  GASTROINTESTINAL:  No abdominal or epigastric pain. No nausea, vomiting, or hematemesis; No diarrhea or constipation. No melena or hematochezia.  GENITOURINARY:  No dysuria, frequency or hematuria  NEUROLOGICAL:  No numbness or weakness  MSK: no back pain, no joint pain  SKIN:  No itching, rashes    PE:  Vital Signs Last 24 Hrs  T(C): 36.6 (29 Mar 2023 05:24), Max: 36.8 (28 Mar 2023 19:00)  T(F): 97.9 (29 Mar 2023 05:24), Max: 98.2 (28 Mar 2023 19:00)  HR: 88 (29 Mar 2023 05:24) (74 - 100)  BP: 115/61 (29 Mar 2023 05:24) (115/61 - 154/78)  RR: 17 (29 Mar 2023 05:24) (17 - 18)  SpO2: 97% (29 Mar 2023 05:24) (96% - 97%)    Parameters below as of 29 Mar 2023 05:24  Patient On (Oxygen Delivery Method): room air  Gen: AOx3, NAD, non-toxic, pleasant  HEAD:  Atraumatic, Normocephalic  EYES: EOMI, PERRLA, conjunctiva and sclera clear  ENT: Moist mucous membranes  NECK: Supple, No JVD  CV: S1+S2 normal, nontachycardic  Resp: Clear bilat, no resp distress, no crackles/wheezes  Abd: Soft, nontender, +BS  Ext: No LE edema, no wounds  : No Khanna  IV/Skin: No thrombophlebitis  Msk: No low back pain, no arthralgias, no joint swelling  Neuro: AAOx3. No sensory deficits, no motor deficits    LABS:                        10.2   14.03 )-----------( 369      ( 29 Mar 2023 05:59 )             33.5       03-29    139  |  108  |  12  ----------------------------<  139<H>  3.6   |  28  |  0.92    Ca    8.4      29 Mar 2023 05:59  Phos  2.7     03-29  Mg     2.3     -29    TPro  6.1  /  Alb  2.1<L>  /  TBili  0.4  /  DBili  x   /  AST  18  /  ALT  13  /  AlkPhos  57  03-29      Urinalysis Basic - ( 28 Mar 2023 14:55 )    Color: Brown / Appearance: Slightly Turbid / S.015 / pH: x  Gluc: x / Ketone: Trace  / Bili: Negative / Urobili: 1 mg/dL   Blood: x / Protein: 100 mg/dL / Nitrite: Positive   Leuk Esterase: Trace / RBC: 25-50 /HPF / WBC 6-10 /HPF   Sq Epi: x / Non Sq Epi: x / Bacteria: Trace /HPF      MICROBIOLOGY:  v    Rapid RVP Result: NotDetec ( @ 13:45)    RADIOLOGY:    ANTIBIOTICS:    IMPRESSION:  86 yo male from [facility] with H/O HTN, HLD, GERD, BPH, hypothyroidism, prostate CA sent from NH due to dysphagia, no reported fever or chills, no septic.  AAOx1, weak elderly  WBC Count: 19.83 K/uL (23 @ 13:45)--> WBC Count: 14.03 K/uL (23 @ 05:59) admission labs look hemoconcentrated        PLAN:    Please reach ID with any questions or concerns  Dr. Bridgette Gurrola  Available in Teams     HPI:  87 year old male from [facility] with PMHx of hypertension, hyperlipidemia. glaucoma, GERD, BPH, hypothyroidism, and prostate cancer, baseline A&Ox1, presenting from NH after difficulty swallowing for x4 weeks. Pt. denies difficulty swallowing during exam, and denies any chest pain, palpitations, headache, urinary discomfort abdominal pain, fever, chills. Only oriented to self, thinks he is in "[facility]", and said the year was "3000". Pt is not a good historian, was unable to get in contact with HCP Ene Serrano. Was admitted in the past for ESBL E.Coli bacteremia in 2020, stay complicated by COVID infection, most recent ED trip s/p mechanical fall 10/2021.     ED vitals:154/78 BP, 17 RR, 74 HR, 98.1 F  Labs: WC 19.83, BMP wnl, UA pos nitrites, leuk esterase, 6-10 WBC, 25-50 RBCs   BCx and UCx pending  Barium swallow Xray pending  (28 Mar 2023 19:14)      Allergies: No Known Allergies    PAST MEDICAL & SURGICAL HISTORY:  Hypothyroidism  HTN (hypertension)  HLD (hyperlipidemia)  Glaucoma  GERD (gastroesophageal reflux disease)  BPH (benign prostatic hyperplasia)  Prostate cancer    No significant past surgical history    SOCIAL HISTORY: [ ] smoking [ ] alcohol [ ] IVDU  FAMILY HISTORY:  no pertinent related medical history  Drug Dosing Weight  Height (cm): 170.2 (20 Oct 2021 15:59)  Weight (kg): 72.6 (20 Oct 2021 15:59)  BMI (kg/m2): 25.1 (20 Oct 2021 15:59)  BSA (m2): 1.84 (20 Oct 2021 15:59)  ANTIMICROBIALS:  meropenem  IVPB 1000 every 8 hours    REVIEW OF SYSTEMS:  CONSTITUTIONAL:  No weakness, fevers or chills  EYES/ENT:  No visual changes;  No vertigo or throat pain   NECK:  No pain or stiffness  RESPIRATORY:  No cough, wheezing, hemoptysis; No shortness of breath  CARDIOVASCULAR:  No chest pain or palpitations  GASTROINTESTINAL:  No abdominal or epigastric pain. No nausea, vomiting, or hematemesis; No diarrhea or constipation. No melena or hematochezia.  GENITOURINARY:  No dysuria, frequency or hematuria  NEUROLOGICAL:  No numbness or weakness  MSK: no back pain, no joint pain  SKIN:  No itching, rashes    PE:  Vital Signs Last 24 Hrs  T(C): 36.6 (29 Mar 2023 05:24), Max: 36.8 (28 Mar 2023 19:00)  T(F): 97.9 (29 Mar 2023 05:24), Max: 98.2 (28 Mar 2023 19:00)  HR: 88 (29 Mar 2023 05:24) (74 - 100)  BP: 115/61 (29 Mar 2023 05:24) (115/61 - 154/78)  RR: 17 (29 Mar 2023 05:24) (17 - 18)  SpO2: 97% (29 Mar 2023 05:24) (96% - 97%)    Parameters below as of 29 Mar 2023 05:24  Patient On (Oxygen Delivery Method): room air  Gen: AOx3, NAD, non-toxic, pleasant  HEAD:  Atraumatic, Normocephalic  EYES: EOMI, PERRLA, conjunctiva and sclera clear  ENT: Moist mucous membranes  NECK: Supple, No JVD  CV: S1+S2 normal, nontachycardic  Resp: Clear bilat, no resp distress, no crackles/wheezes  Abd: Soft, nontender, +BS  Ext: No LE edema, no wounds  : No Khanna  IV/Skin: No thrombophlebitis  Msk: No low back pain, no arthralgias, no joint swelling  Neuro: AAOx3. No sensory deficits, no motor deficits    LABS:                        10.2   14.03 )-----------( 369      ( 29 Mar 2023 05:59 )             33.5       03-29    139  |  108  |  12  ----------------------------<  139<H>  3.6   |  28  |  0.92    Ca    8.4      29 Mar 2023 05:59  Phos  2.7     03-29  Mg     2.3     -29    TPro  6.1  /  Alb  2.1<L>  /  TBili  0.4  /  DBili  x   /  AST  18  /  ALT  13  /  AlkPhos  57  03-29      Urinalysis Basic - ( 28 Mar 2023 14:55 )    Color: Brown / Appearance: Slightly Turbid / S.015 / pH: x  Gluc: x / Ketone: Trace  / Bili: Negative / Urobili: 1 mg/dL   Blood: x / Protein: 100 mg/dL / Nitrite: Positive   Leuk Esterase: Trace / RBC: 25-50 /HPF / WBC 6-10 /HPF   Sq Epi: x / Non Sq Epi: x / Bacteria: Trace /HPF      MICROBIOLOGY:  v    Rapid RVP Result: NotDetec ( @ 13:45)    RADIOLOGY:    ANTIBIOTICS:    IMPRESSION:  88 yo male from [facility] with H/O HTN, HLD, GERD, BPH, hypothyroidism, prostate CA sent from NH due to dysphagia, no reported fever or chills, no septic.  AAOx1, weak elderly  WBC Count: 19.83 K/uL (23 @ 13:45)--> WBC Count: 14.03 K/uL (23 @ 05:59) admission labs look hemoconcentrated  UA +, unclear if urinary symptoms    -UTI  -Dysphagia  -H/O prostate CA  -NH resident    PLAN:    Please reach ID with any questions or concerns  Dr. Bridgette Gurrola  Available in Teams     HPI:  87 year old male from [facility] with PMHx of hypertension, hyperlipidemia. glaucoma, GERD, BPH, hypothyroidism, and prostate cancer, baseline A&Ox1, presenting from NH after difficulty swallowing for x4 weeks. Pt. denies difficulty swallowing during exam, and denies any chest pain, palpitations, headache, urinary discomfort abdominal pain, fever, chills. Only oriented to self, thinks he is in "[facility]", and said the year was "3000". Pt is not a good historian, he is voiding freely and denies dysuria, no hendrickson cath present. Was admitted in the past for ESBL E.Coli bacteremia in 2020, stay complicated by COVID infection, most recent ED trip s/p mechanical fall 10/2021.     ED vitals:154/78 BP, 17 RR, 74 HR, 98.1 F  Labs: WC 19.83, BMP wnl, UA pos nitrites, leuk esterase, 6-10 WBC, 25-50 RBCs   BCx and UCx pending  Barium swallow Xray pending  (28 Mar 2023 19:14)      Allergies: No Known Allergies    PAST MEDICAL & SURGICAL HISTORY:  Hypothyroidism  HTN (hypertension)  HLD (hyperlipidemia)  Glaucoma  GERD (gastroesophageal reflux disease)  BPH (benign prostatic hyperplasia)  Prostate cancer    No significant past surgical history    SOCIAL HISTORY: no known toxic habits(no EtOH, no smoking or IVDU) NH resident, pt with dementia  FAMILY HISTORY:  no pertinent related medical history  Drug Dosing Weight  Height (cm): 170.2 (20 Oct 2021 15:59)  Weight (kg): 72.6 (20 Oct 2021 15:59)  BMI (kg/m2): 25.1 (20 Oct 2021 15:59)  BSA (m2): 1.84 (20 Oct 2021 15:59)  ANTIMICROBIALS:  meropenem  IVPB 1000 every 8 hours    REVIEW OF SYSTEMS: pt is poor historian   CONSTITUTIONAL:  No fevers  EYES/ENT: no eye symptoms   NECK:  No pain  RESPIRATORY:  No cough, no shortness of breath  CARDIOVASCULAR:  No chest pain or palpitations  GASTROINTESTINAL:  No abdominal pain  GENITOURINARY:  No dysuria  NEUROLOGICAL:  no headache  MSK: no back pain, no joint pain  SKIN:  No itching, rashes    PE:  Vital Signs Last 24 Hrs  T(C): 36.6 (29 Mar 2023 05:24), Max: 36.8 (28 Mar 2023 19:00)  T(F): 97.9 (29 Mar 2023 05:24), Max: 98.2 (28 Mar 2023 19:00)  HR: 88 (29 Mar 2023 05:24) (74 - 100)  BP: 115/61 (29 Mar 2023 05:24) (115/61 - 154/78)  RR: 17 (29 Mar 2023 05:24) (17 - 18)  SpO2: 97% (29 Mar 2023 05:24) (96% - 97%)    Parameters below as of 29 Mar 2023 05:24  Patient On (Oxygen Delivery Method): room air  Gen: AOx3, NAD, non-toxic, pleasant  HEAD:  Atraumatic, Normocephalic  EYES: EOMI, PERRLA, conjunctiva and sclera clear  ENT: Moist mucous membranes  NECK: Supple, No JVD  CV: S1+S2 normal, no murmurs  Resp: CTA, no rales  Abd: Soft, nontender, +BS  Ext: No LE edema, no cyanosis  : No Hendrickson, voiding well  IV/Skin: No thrombophlebitis  Msk: No low back pain, no sacral lesions, no joint swelling  Neuro: AAOx1. No sensory deficits, no motor deficits    LABS:                        10.2   14.03 )-----------( 369      ( 29 Mar 2023 05:59 )             33.5       03-29    139  |  108  |  12  ----------------------------<  139<H>  3.6   |  28  |  0.92    Ca    8.4      29 Mar 2023 05:59  Phos  2.7       Mg     2.3     29    TPro  6.1  /  Alb  2.1<L>  /  TBili  0.4  /  DBili  x   /  AST  18  /  ALT  13  /  AlkPhos  57  -29      Urinalysis Basic - ( 28 Mar 2023 14:55 )  Color: Brown / Appearance: Slightly Turbid / S.015 / pH: x  Gluc: x / Ketone: Trace  / Bili: Negative / Urobili: 1 mg/dL   Blood: x / Protein: 100 mg/dL / Nitrite: Positive   Leuk Esterase: Trace / RBC: 25-50 /HPF / WBC 6-10 /HPF   Sq Epi: x / Non Sq Epi: x / Bacteria: Trace /HPF      MICROBIOLOGY:  v    Rapid RVP Result: NotDetec ( @ 13:45)    RADIOLOGY:  < from: Xray Esophagram Single Contrast (23 @ 14:12) >  IMPRESSION:    Mildly dilated proximal and midesophagus with mild narrowing of the   distalesophagus with mild irregularity of esophageal wall; an endoscopy   is recommended for further evaluation if feasible.    Mild delay in passage of barium through the esophagus with tertiary   contractions and intraesophageal reflux.    Small to moderate hiatal hernia.    ANTIBIOTICS: s/p meropenem(3/28-3/29)  Cefepime 3/29-    IMPRESSION:  86 yo male from [facility] with H/O HTN, HLD, GERD, BPH, hypothyroidism, prostate CA sent from NH due to dysphagia, no reported fever or chills, no septic.  AAOx1, weak elderly  WBC Count: 19.83 K/uL (23 @ 13:45)--> WBC Count: 14.03 K/uL (23 @ 05:59) admission labs look hemoconcentrated  UA +, unclear if urinary symptoms    -UTI(cystitis)  -Dysphagia  -H/O prostate CA  -Dementia  -NH resident    PLAN:  d/c meropenem  start cefepime 1g q8 hrs IV 3-5 days  follow up cultures  dysphagia lezama as per primary team, ensure aspiration precautions  Nutrition consult  Discussed with NP and medicine attending    Please reach ID with any questions or concerns  Dr. Bridgette Gurrola  Available in Teams     HPI:  87 year old male from [facility] with PMHx of hypertension, hyperlipidemia. glaucoma, GERD, BPH, hypothyroidism, and prostate cancer, baseline A&Ox1, presenting from NH after difficulty swallowing for x4 weeks. Pt. denies difficulty swallowing during exam, and denies any chest pain, palpitations, headache, urinary discomfort abdominal pain, fever, chills. Only oriented to self, thinks he is in "[facility]", and said the year was "3000". Pt is not a good historian, he is voiding freely and denies dysuria, no hendrickson cath present. Was admitted in the past for ESBL E.Coli bacteremia in 2020, stay complicated by COVID infection, most recent ED trip s/p mechanical fall 10/2021.     ED vitals:154/78 BP, 17 RR, 74 HR, 98.1 F  Labs: WC 19.83, BMP wnl, UA pos nitrites, leuk esterase, 6-10 WBC, 25-50 RBCs   BCx and UCx pending  Barium swallow Xray pending  (28 Mar 2023 19:14)      Allergies: No Known Allergies    PAST MEDICAL & SURGICAL HISTORY:  Hypothyroidism  HTN (hypertension)  HLD (hyperlipidemia)  Glaucoma  GERD (gastroesophageal reflux disease)  BPH (benign prostatic hyperplasia)  Prostate cancer    No significant past surgical history    SOCIAL HISTORY: no known toxic habits(no EtOH, no smoking or IVDU) NH resident, pt with dementia  FAMILY HISTORY:  no pertinent related medical history  Drug Dosing Weight  Height (cm): 170.2 (20 Oct 2021 15:59)  Weight (kg): 72.6 (20 Oct 2021 15:59)  BMI (kg/m2): 25.1 (20 Oct 2021 15:59)  BSA (m2): 1.84 (20 Oct 2021 15:59)  ANTIMICROBIALS:  meropenem  IVPB 1000 every 8 hours    REVIEW OF SYSTEMS: pt is poor historian   CONSTITUTIONAL:  No fevers  EYES/ENT: no eye symptoms   NECK:  No pain  RESPIRATORY:  No cough, no shortness of breath  CARDIOVASCULAR:  No chest pain or palpitations  GASTROINTESTINAL:  No abdominal pain  GENITOURINARY:  No dysuria  NEUROLOGICAL:  no headache  MSK: no back pain, no joint pain  SKIN:  No itching, rashes    PE:  Vital Signs Last 24 Hrs  T(C): 36.6 (29 Mar 2023 05:24), Max: 36.8 (28 Mar 2023 19:00)  T(F): 97.9 (29 Mar 2023 05:24), Max: 98.2 (28 Mar 2023 19:00)  HR: 88 (29 Mar 2023 05:24) (74 - 100)  BP: 115/61 (29 Mar 2023 05:24) (115/61 - 154/78)  RR: 17 (29 Mar 2023 05:24) (17 - 18)  SpO2: 97% (29 Mar 2023 05:24) (96% - 97%)    Parameters below as of 29 Mar 2023 05:24  Patient On (Oxygen Delivery Method): room air  Gen: AOx3, NAD, non-toxic, pleasant  HEAD:  Atraumatic, Normocephalic  EYES: EOMI, PERRLA, conjunctiva and sclera clear  ENT: Moist mucous membranes  NECK: Supple, No JVD  CV: S1+S2 normal, no murmurs  Resp: CTA, no rales  Abd: Soft, nontender, +BS  Ext: No LE edema, no cyanosis  : No Hendrickson, voiding well  IV/Skin: No thrombophlebitis  Msk: No low back pain, no sacral lesions, no joint swelling  Neuro: AAOx1. No sensory deficits, no motor deficits    LABS:                        10.2   14.03 )-----------( 369      ( 29 Mar 2023 05:59 )             33.5       03-29    139  |  108  |  12  ----------------------------<  139<H>  3.6   |  28  |  0.92    Ca    8.4      29 Mar 2023 05:59  Phos  2.7       Mg     2.3     29    TPro  6.1  /  Alb  2.1<L>  /  TBili  0.4  /  DBili  x   /  AST  18  /  ALT  13  /  AlkPhos  57  -29      Urinalysis Basic - ( 28 Mar 2023 14:55 )  Color: Brown / Appearance: Slightly Turbid / S.015 / pH: x  Gluc: x / Ketone: Trace  / Bili: Negative / Urobili: 1 mg/dL   Blood: x / Protein: 100 mg/dL / Nitrite: Positive   Leuk Esterase: Trace / RBC: 25-50 /HPF / WBC 6-10 /HPF   Sq Epi: x / Non Sq Epi: x / Bacteria: Trace /HPF      MICROBIOLOGY:  v    Rapid RVP Result: NotDetec ( @ 13:45)    RADIOLOGY:  < from: Xray Esophagram Single Contrast (23 @ 14:12) >  IMPRESSION:    Mildly dilated proximal and midesophagus with mild narrowing of the   distalesophagus with mild irregularity of esophageal wall; an endoscopy   is recommended for further evaluation if feasible.    Mild delay in passage of barium through the esophagus with tertiary   contractions and intraesophageal reflux.    Small to moderate hiatal hernia.    ANTIBIOTICS: s/p meropenem(3/28-3/29)  Cefepime 3/29-    IMPRESSION:  86 yo male from [facility] with H/O HTN, HLD, GERD, BPH, hypothyroidism, prostate CA sent from NH due to dysphagia, no reported fever or chills, no septic.  AAOx1, weak elderly  WBC Count: 19.83 K/uL (23 @ 13:45)--> WBC Count: 14.03 K/uL (23 @ 05:59) admission labs look hemoconcentrated  UA +, unclear if urinary symptoms    -Acute UTI  -Dysphagia  -H/O prostate CA  -Dementia  -NH resident    PLAN:  d/c meropenem  start cefepime 1g q8 hrs IV 5-7 days  follow up cultures  dysphagia lezama as per primary team, ensure aspiration precautions  Nutrition consult  Discussed with NP and medicine attending    Please reach ID with any questions or concerns  Dr. Bridgette Gurrola  Available in Teams

## 2023-03-29 NOTE — CONSULT NOTE ADULT - SUBJECTIVE AND OBJECTIVE BOX
[facility]    Patient is a 89y old  Male who presents with a chief complaint of   HPI:  87 year old male from [facility] with PMHx of hypertension, hyperlipidemia. glaucoma, GERD, BPH, hypothyroidism, and prostate cancer, baseline A&Ox1, presenting from NH after difficulty swallowing for x4 weeks. Pt. denies difficulty swallowing during exam, and denies any chest pain, palpitations, headache, urinary discomfort abdominal pain, fever, chills. Only oriented to self, thinks he is in "[facility]", and said the year was "3000". Pt is not a good historian, was unable to get in contact with HCP Ene Serrano. Was admitted in the past for ESBL E.Coli bacteremia in 4/2020, stay complicated by COVID infection, most recent ED trip s/p mechanical fall 10/2021.     ED vitals:154/78 BP, 17 RR, 74 HR, 98.1 F  Labs: WC 19.83, BMP wnl, UA pos nitrites, leuk esterase, 6-10 WBC, 25-50 RBCs   BCx and UCx pending  Barium swallow Xray pending  (28 Mar 2023 19:14)      PMH/PSH:  PAST MEDICAL & SURGICAL HISTORY:  Hypothyroidism  HTN (hypertension)  HLD (hyperlipidemia)  Glaucoma  GERD (gastroesophageal reflux disease)  BPH (benign prostatic hyperplasia)  Prostate cancer  No significant past surgical history        FH:  FAMILY HISTORY:        MEDS:  MEDICATIONS  (STANDING):  ascorbic acid 500 milliGRAM(s) Oral daily  aspirin  chewable 81 milliGRAM(s) Oral daily  dextrose 5% + sodium chloride 0.9%. 1000 milliLiter(s) (75 mL/Hr) IV Continuous <Continuous>  finasteride 5 milliGRAM(s) Oral daily  heparin   Injectable 5000 Unit(s) SubCutaneous every 12 hours  latanoprost 0.005% Ophthalmic Solution 1 Drop(s) Both EYES at bedtime  levothyroxine 25 MICROGram(s) Oral daily  meropenem  IVPB 1000 milliGRAM(s) IV Intermittent every 8 hours  metoprolol succinate ER 25 milliGRAM(s) Oral daily  pantoprazole    Tablet 40 milliGRAM(s) Oral before breakfast  simvastatin 20 milliGRAM(s) Oral at bedtime  tamsulosin 0.4 milliGRAM(s) Oral at bedtime    MEDICATIONS  (PRN):    Allergies  No Known Allergies  Intolerances      ROS: Unable to obtain ROS given baseline A&O x self.  ______________________________________________________________________  PHYSICAL EXAM:  T(C): 36.6 (03-29-23 @ 05:24), Max: 36.8 (03-28-23 @ 19:00)  HR: 88 (03-29-23 @ 05:24)  BP: 115/61 (03-29-23 @ 05:24)  RR: 17 (03-29-23 @ 05:24)  SpO2: 97% (03-29-23 @ 05:24)  Wt(kg): --      GEN: NAD  HEENT: EOMI, conjunctivae anicteric, neck supple, dry mucous membranes  PULM: LSCTAB, no wheezing, rales, or rhonchi  CV: RRR, no m/r/b  GI: Soft, NT, ND; +BS in all four quadrants, no ascites, no Colon's sign  MSK: RIZO, no edema  NEURO: A&O x 1  ______________________________________________________________________  LABS:                        10.2   14.03 )-----------( 369      ( 29 Mar 2023 05:59 )             33.5     03-29    139  |  108  |  12  ----------------------------<  139<H>  3.6   |  28  |  0.92    Ca    8.4      29 Mar 2023 05:59  Phos  2.7     03-29  Mg     2.3     03-29    TPro  6.1  /  Alb  2.1<L>  /  TBili  0.4  /  DBili  x   /  AST  18  /  ALT  13  /  AlkPhos  57  03-29    LIVER FUNCTIONS - ( 29 Mar 2023 05:59 )  Alb: 2.1 g/dL / Pro: 6.1 g/dL / ALK PHOS: 57 U/L / ALT: 13 U/L DA / AST: 18 U/L / GGT: x           PT/INR - ( 28 Mar 2023 13:45 )   PT: 14.1 sec;   INR: 1.18 ratio         PTT - ( 28 Mar 2023 13:45 )  PTT:38.7 sec  ____________________________________________    IMAGING:    CXR pending.   Esophagram ordered.

## 2023-03-29 NOTE — PROGRESS NOTE ADULT - SUBJECTIVE AND OBJECTIVE BOX
Patient is a 89y old  Male who presents with a chief complaint of   PATIENT IS SEEN AND EXAMINED IN MEDICAL FLOOR.  JOANNT [    ]    IVETT [   ]      GT [   ]    ALLERGIES:  No Known Allergies      Daily     Daily     VITALS:    Vital Signs Last 24 Hrs  T(C): 36.6 (29 Mar 2023 05:24), Max: 36.8 (28 Mar 2023 19:00)  T(F): 97.9 (29 Mar 2023 05:24), Max: 98.2 (28 Mar 2023 19:00)  HR: 88 (29 Mar 2023 05:24) (74 - 100)  BP: 115/61 (29 Mar 2023 05:24) (115/61 - 154/78)  BP(mean): --  RR: 17 (29 Mar 2023 05:24) (17 - 18)  SpO2: 97% (29 Mar 2023 05:24) (96% - 97%)    Parameters below as of 29 Mar 2023 05:24  Patient On (Oxygen Delivery Method): room air        LABS:    CBC Full  -  ( 29 Mar 2023 05:59 )  WBC Count : 14.03 K/uL  RBC Count : 3.86 M/uL  Hemoglobin : 10.2 g/dL  Hematocrit : 33.5 %  Platelet Count - Automated : 369 K/uL  Mean Cell Volume : 86.8 fl  Mean Cell Hemoglobin : 26.4 pg  Mean Cell Hemoglobin Concentration : 30.4 gm/dL  Auto Neutrophil # : 11.78 K/uL  Auto Lymphocyte # : 1.01 K/uL  Auto Monocyte # : 0.91 K/uL  Auto Eosinophil # : 0.19 K/uL  Auto Basophil # : 0.03 K/uL  Auto Neutrophil % : 83.9 %  Auto Lymphocyte % : 7.2 %  Auto Monocyte % : 6.5 %  Auto Eosinophil % : 1.4 %  Auto Basophil % : 0.2 %    PT/INR - ( 28 Mar 2023 13:45 )   PT: 14.1 sec;   INR: 1.18 ratio         PTT - ( 28 Mar 2023 13:45 )  PTT:38.7 sec  03-29    139  |  108  |  12  ----------------------------<  139<H>  3.6   |  28  |  0.92    Ca    8.4      29 Mar 2023 05:59  Phos  2.7     03-29  Mg     2.3     03-29    TPro  6.1  /  Alb  2.1<L>  /  TBili  0.4  /  DBili  x   /  AST  18  /  ALT  13  /  AlkPhos  57  03-29    CAPILLARY BLOOD GLUCOSE      POCT Blood Glucose.: 109 mg/dL (29 Mar 2023 07:58)    CARDIAC MARKERS ( 28 Mar 2023 13:45 )  x     / x     / 53 U/L / x     / x          LIVER FUNCTIONS - ( 29 Mar 2023 05:59 )  Alb: 2.1 g/dL / Pro: 6.1 g/dL / ALK PHOS: 57 U/L / ALT: 13 U/L DA / AST: 18 U/L / GGT: x           Creatinine Trend: 0.92<--, 1.09<--  I&O's Summary              MEDICATIONS:    MEDICATIONS  (STANDING):  ascorbic acid 500 milliGRAM(s) Oral daily  aspirin  chewable 81 milliGRAM(s) Oral daily  dextrose 5% + sodium chloride 0.9%. 1000 milliLiter(s) (75 mL/Hr) IV Continuous <Continuous>  finasteride 5 milliGRAM(s) Oral daily  heparin   Injectable 5000 Unit(s) SubCutaneous every 12 hours  latanoprost 0.005% Ophthalmic Solution 1 Drop(s) Both EYES at bedtime  levothyroxine 25 MICROGram(s) Oral daily  meropenem  IVPB 1000 milliGRAM(s) IV Intermittent every 8 hours  metoprolol succinate ER 25 milliGRAM(s) Oral daily  pantoprazole    Tablet 40 milliGRAM(s) Oral before breakfast  simvastatin 20 milliGRAM(s) Oral at bedtime  tamsulosin 0.4 milliGRAM(s) Oral at bedtime      MEDICATIONS  (PRN):      REVIEW OF SYSTEMS:                           ALL ROS DONE [ X   ]    CONSTITUTIONAL:  LETHARGIC [   ], FEVER [   ], UNRESPONSIVE [   ]  CVS:  CP  [   ], SOB, [   ], PALPITATIONS [   ], DIZZYNESS [   ]  RS: COUGH [   ], SPUTUM [   ]  GI: ABDOMINAL PAIN [   ], NAUSEA [   ], VOMITINGS [   ], DIARRHEA [   ], CONSTIPATION [   ]  :  DYSURIA [   ], NOCTURIA [   ], INCREASED FREQUENCY [   ], DRIBLING [   ],  SKELETAL: PAINFUL JOINTS [   ], SWOLLEN JOINTS [   ], NECK ACHE [   ], LOW BACK ACHE [   ],  SKIN : ULCERS [   ], RASH [   ], ITCHING [   ]  CNS: HEAD ACHE [   ], DOUBLE VISION [   ], BLURRED VISION [   ], AMS / CONFUSION [   ], SEIZURES [   ], WEAKNESS [   ],TINGLING / NUMBNESS [   ]    PHYSICAL EXAMINATION:  GENERAL APPEARANCE: NO DISTRESS  HEENT:  NO PALLOR, NO  JVD,  NO   NODES, NECK SUPPLE  CVS: S1 +, S2 +,   RS: AEEB,  OCCASIONAL  RALES +,   NO RONCHI  ABD: SOFT, NT, NO, BS +  EXT: NO PE  SKIN: WARM,   SKELETAL:  ROM ACCEPTABLE  CNS:  AAO X    ,   DEFICITS    RADIOLOGY :      ASSESSMENT :     Urinary tract infection    No pertinent past medical history    Hypothyroidism    HTN (hypertension)    HLD (hyperlipidemia)    Glaucoma    GERD (gastroesophageal reflux disease)    BPH (benign prostatic hyperplasia)    Prostate cancer    No significant past surgical history        PLAN:  HPI:  87 year old male from [facility] with PMHx of hypertension, hyperlipidemia. glaucoma, GERD, BPH, hypothyroidism, and prostate cancer, baseline A&Ox1, presenting from NH after difficulty swallowing for x4 weeks. Pt. denies difficulty swallowing during exam, and denies any chest pain, palpitations, headache, urinary discomfort abdominal pain, fever, chills. Only oriented to self, thinks he is in "[facility]", and said the year was "3000". Pt is not a good historian, was unable to get in contact with HCP Ene Serrano. Was admitted in the past for ESBL E.Coli bacteremia in 4/2020, stay complicated by COVID infection, most recent ED trip s/p mechanical fall 10/2021.     ED vitals:154/78 BP, 17 RR, 74 HR, 98.1 F  Labs: WC 19.83, BMP wnl, UA pos nitrites, leuk esterase, 6-10 WBC, 25-50 RBCs   BCx and UCx pending  Barium swallow Xray pending  (28 Mar 2023 19:14)    # SEPSIS S/T SUSPECTED UTI, HX OF ESBL ECOLI UTI  - PLACED ON MEROPENEM, F/U BCX AND UCX  - ID CONSULT    # DYSPHAGIA  - F/U ST EVAL  - F/U BARIUM ESOPHAGRAM  - GI CONSULT    # HTN  # HLD  # GERD  # BPH, HX OF PROSTATE CA  # HYPOTHYROIDISM  # GLAUCOMA  # GI AND DVT PPX .       Patient is a 89y old  Male who presents with a chief complaint of dysphagia.    PATIENT IS SEEN AND EXAMINED IN MEDICAL FLOOR.    ALLERGIES:  No Known Allergies    VITALS:    Vital Signs Last 24 Hrs  T(C): 36.6 (29 Mar 2023 05:24), Max: 36.8 (28 Mar 2023 19:00)  T(F): 97.9 (29 Mar 2023 05:24), Max: 98.2 (28 Mar 2023 19:00)  HR: 88 (29 Mar 2023 05:24) (74 - 100)  BP: 115/61 (29 Mar 2023 05:24) (115/61 - 154/78)  BP(mean): --  RR: 17 (29 Mar 2023 05:24) (17 - 18)  SpO2: 97% (29 Mar 2023 05:24) (96% - 97%)    Parameters below as of 29 Mar 2023 05:24  Patient On (Oxygen Delivery Method): room air        LABS:    CBC Full  -  ( 29 Mar 2023 05:59 )  WBC Count : 14.03 K/uL  RBC Count : 3.86 M/uL  Hemoglobin : 10.2 g/dL  Hematocrit : 33.5 %  Platelet Count - Automated : 369 K/uL  Mean Cell Volume : 86.8 fl  Mean Cell Hemoglobin : 26.4 pg  Mean Cell Hemoglobin Concentration : 30.4 gm/dL  Auto Neutrophil # : 11.78 K/uL  Auto Lymphocyte # : 1.01 K/uL  Auto Monocyte # : 0.91 K/uL  Auto Eosinophil # : 0.19 K/uL  Auto Basophil # : 0.03 K/uL  Auto Neutrophil % : 83.9 %  Auto Lymphocyte % : 7.2 %  Auto Monocyte % : 6.5 %  Auto Eosinophil % : 1.4 %  Auto Basophil % : 0.2 %    PT/INR - ( 28 Mar 2023 13:45 )   PT: 14.1 sec;   INR: 1.18 ratio         PTT - ( 28 Mar 2023 13:45 )  PTT:38.7 sec  03-29    139  |  108  |  12  ----------------------------<  139<H>  3.6   |  28  |  0.92    Ca    8.4      29 Mar 2023 05:59  Phos  2.7     03-29  Mg     2.3     03-29    TPro  6.1  /  Alb  2.1<L>  /  TBili  0.4  /  DBili  x   /  AST  18  /  ALT  13  /  AlkPhos  57  03-29    CAPILLARY BLOOD GLUCOSE      POCT Blood Glucose.: 109 mg/dL (29 Mar 2023 07:58)    CARDIAC MARKERS ( 28 Mar 2023 13:45 )  x     / x     / 53 U/L / x     / x          LIVER FUNCTIONS - ( 29 Mar 2023 05:59 )  Alb: 2.1 g/dL / Pro: 6.1 g/dL / ALK PHOS: 57 U/L / ALT: 13 U/L DA / AST: 18 U/L / GGT: x           Creatinine Trend: 0.92<--, 1.09<--  I&O's Summary              MEDICATIONS:    MEDICATIONS  (STANDING):  ascorbic acid 500 milliGRAM(s) Oral daily  aspirin  chewable 81 milliGRAM(s) Oral daily  dextrose 5% + sodium chloride 0.9%. 1000 milliLiter(s) (75 mL/Hr) IV Continuous <Continuous>  finasteride 5 milliGRAM(s) Oral daily  heparin   Injectable 5000 Unit(s) SubCutaneous every 12 hours  latanoprost 0.005% Ophthalmic Solution 1 Drop(s) Both EYES at bedtime  levothyroxine 25 MICROGram(s) Oral daily  meropenem  IVPB 1000 milliGRAM(s) IV Intermittent every 8 hours  metoprolol succinate ER 25 milliGRAM(s) Oral daily  pantoprazole    Tablet 40 milliGRAM(s) Oral before breakfast  simvastatin 20 milliGRAM(s) Oral at bedtime  tamsulosin 0.4 milliGRAM(s) Oral at bedtime      MEDICATIONS  (PRN):      REVIEW OF SYSTEMS:                           ALL ROS DONE [ X   ]    CONSTITUTIONAL:  LETHARGIC [   ], FEVER [   ], UNRESPONSIVE [   ]  CVS:  CP  [   ], SOB, [   ], PALPITATIONS [   ], DIZZYNESS [   ]  RS: COUGH [   ], SPUTUM [   ]  GI: ABDOMINAL PAIN [   ], NAUSEA [   ], VOMITINGS [   ], DIARRHEA [   ], CONSTIPATION [   ]  :  DYSURIA [   ], NOCTURIA [   ], INCREASED FREQUENCY [   ], DRIBLING [   ],  SKELETAL: PAINFUL JOINTS [   ], SWOLLEN JOINTS [   ], NECK ACHE [   ], LOW BACK ACHE [   ],  SKIN : ULCERS [   ], RASH [   ], ITCHING [   ]  CNS: HEAD ACHE [   ], DOUBLE VISION [   ], BLURRED VISION [   ], AMS / CONFUSION [   ], SEIZURES [   ], WEAKNESS [   ],TINGLING / NUMBNESS [   ]    PHYSICAL EXAMINATION:  GENERAL APPEARANCE: NO DISTRESS  HEENT:  NO PALLOR, NO  JVD,  NO   NODES, NECK SUPPLE  CVS: S1 +, S2 +,   RS: AEEB,  OCCASIONAL  RALES +,   NO RONCHI  ABD: SOFT, NT, NO, BS +  EXT: NO PE  SKIN: WARM,   SKELETAL:  ROM ACCEPTABLE  CNS:  AAO X 1 ,   DEFICITS    RADIOLOGY :      ASSESSMENT :     Urinary tract infection    No pertinent past medical history    Hypothyroidism    HTN (hypertension)    HLD (hyperlipidemia)    Glaucoma    GERD (gastroesophageal reflux disease)    BPH (benign prostatic hyperplasia)    Prostate cancer    No significant past surgical history        PLAN:  HPI:  87 year old male from [facility] with PMHx of hypertension, hyperlipidemia. glaucoma, GERD, BPH, hypothyroidism, and prostate cancer, baseline A&Ox1, presenting from NH after difficulty swallowing for x4 weeks. Pt. denies difficulty swallowing during exam, and denies any chest pain, palpitations, headache, urinary discomfort abdominal pain, fever, chills. Only oriented to self, thinks he is in "[facility]", and said the year was "3000". Pt is not a good historian, was unable to get in contact with HCP Ene Serrano. Was admitted in the past for ESBL E.Coli bacteremia in 4/2020, stay complicated by COVID infection, most recent ED trip s/p mechanical fall 10/2021.     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

## 2023-03-29 NOTE — PATIENT PROFILE ADULT - VISION (WITH CORRECTIVE LENSES IF THE PATIENT USUALLY WEARS THEM):
Partially impaired: cannot see medication labels or newsprint, but can see obstacles in path, and the surrounding layout; can count fingers at arm's length no

## 2023-03-30 DIAGNOSIS — Z02.9 ENCOUNTER FOR ADMINISTRATIVE EXAMINATIONS, UNSPECIFIED: ICD-10-CM

## 2023-03-30 PROCEDURE — 99232 SBSQ HOSP IP/OBS MODERATE 35: CPT

## 2023-03-30 RX ORDER — CEFTRIAXONE 500 MG/1
1000 INJECTION, POWDER, FOR SOLUTION INTRAMUSCULAR; INTRAVENOUS EVERY 24 HOURS
Refills: 0 | Status: COMPLETED | OUTPATIENT
Start: 2023-03-31 | End: 2023-03-31

## 2023-03-30 RX ADMIN — Medication 25 MILLIGRAM(S): at 05:40

## 2023-03-30 RX ADMIN — SIMVASTATIN 20 MILLIGRAM(S): 20 TABLET, FILM COATED ORAL at 21:42

## 2023-03-30 RX ADMIN — LATANOPROST 1 DROP(S): 0.05 SOLUTION/ DROPS OPHTHALMIC; TOPICAL at 21:42

## 2023-03-30 RX ADMIN — PANTOPRAZOLE SODIUM 40 MILLIGRAM(S): 20 TABLET, DELAYED RELEASE ORAL at 05:40

## 2023-03-30 RX ADMIN — Medication 25 MICROGRAM(S): at 05:40

## 2023-03-30 RX ADMIN — FINASTERIDE 5 MILLIGRAM(S): 5 TABLET, FILM COATED ORAL at 11:30

## 2023-03-30 RX ADMIN — TAMSULOSIN HYDROCHLORIDE 0.4 MILLIGRAM(S): 0.4 CAPSULE ORAL at 21:42

## 2023-03-30 RX ADMIN — Medication 500 MILLIGRAM(S): at 11:34

## 2023-03-30 RX ADMIN — HEPARIN SODIUM 5000 UNIT(S): 5000 INJECTION INTRAVENOUS; SUBCUTANEOUS at 05:39

## 2023-03-30 RX ADMIN — CEFEPIME 100 MILLIGRAM(S): 1 INJECTION, POWDER, FOR SOLUTION INTRAMUSCULAR; INTRAVENOUS at 05:39

## 2023-03-30 RX ADMIN — Medication 81 MILLIGRAM(S): at 11:31

## 2023-03-30 RX ADMIN — HEPARIN SODIUM 5000 UNIT(S): 5000 INJECTION INTRAVENOUS; SUBCUTANEOUS at 17:09

## 2023-03-30 RX ADMIN — CEFEPIME 100 MILLIGRAM(S): 1 INJECTION, POWDER, FOR SOLUTION INTRAMUSCULAR; INTRAVENOUS at 17:09

## 2023-03-30 NOTE — PROGRESS NOTE ADULT - SUBJECTIVE AND OBJECTIVE BOX
NP Note discussed with  primary attending    Patient is a 89y old  Male who presents with a chief complaint of Dysphagia (30 Mar 2023 11:53)      INTERVAL HPI/OVERNIGHT EVENTS: Pt reports, "I'm alright."  Offers no complaints or concerns.      MEDICATIONS  (STANDING):  ascorbic acid 500 milliGRAM(s) Oral daily  aspirin  chewable 81 milliGRAM(s) Oral daily  cefepime   IVPB 1000 milliGRAM(s) IV Intermittent every 8 hours  cefepime   IVPB      dextrose 5% + sodium chloride 0.9%. 1000 milliLiter(s) (75 mL/Hr) IV Continuous <Continuous>  finasteride 5 milliGRAM(s) Oral daily  heparin   Injectable 5000 Unit(s) SubCutaneous every 12 hours  latanoprost 0.005% Ophthalmic Solution 1 Drop(s) Both EYES at bedtime  levothyroxine 25 MICROGram(s) Oral daily  metoprolol succinate ER 25 milliGRAM(s) Oral daily  pantoprazole    Tablet 40 milliGRAM(s) Oral before breakfast  simvastatin 20 milliGRAM(s) Oral at bedtime  tamsulosin 0.4 milliGRAM(s) Oral at bedtime    MEDICATIONS  (PRN):      __________________________________________________  REVIEW OF SYSTEMS:    CONSTITUTIONAL: No fever  EYES: No acute visual disturbances  NECK: No pain or stiffness  RESPIRATORY: (+) Intermittent cough; No shortness of breath  CARDIOVASCULAR: No chest pain, no palpitations  GASTROINTESTINAL: No pain. No nausea or vomiting.  No diarrhea   NEUROLOGICAL: No headache or numbness, no tremors  MUSCULOSKELETAL: No joint pain, no muscle pain  GENITOURINARY: No dysuria, no frequency, no hesitancy  PSYCHIATRY: No depression , no anxiety  ALL OTHER  ROS negative        Vital Signs Last 24 Hrs  T(C): 36.7 (30 Mar 2023 04:58), Max: 37.4 (29 Mar 2023 20:40)  T(F): 98.1 (30 Mar 2023 04:58), Max: 99.3 (29 Mar 2023 20:40)  HR: 86 (30 Mar 2023 04:58) (86 - 89)  BP: 116/79 (30 Mar 2023 04:58) (102/46 - 116/79)  RR: 18 (30 Mar 2023 04:58) (18 - 18)  SpO2: 96% (30 Mar 2023 04:58) (95% - 96%)    Parameters below as of 30 Mar 2023 04:58  Patient On (Oxygen Delivery Method): room air        ________________________________________________  PHYSICAL EXAM:  GENERAL: NAD, thin appearing  HEENT: Normocephalic;  conjunctivae and sclerae clear; moist mucous membranes   NECK : Supple  CHEST/LUNG: Clear to auscultation bilaterally with good air entry   HEART: S1 S2  regular; no murmurs, gallops or rubs  ABDOMEN: Soft, Nontender, Nondistended; Bowel sounds present x 4 quad  EXTREMITIES: No cyanosis; no edema; no calf tenderness  SKIN: Warm and dry; no rash  NERVOUS SYSTEM:  Awake and alert; Oriented to place & person, not time; no new deficits    _________________________________________________  LABS:                        10.2   14.03 )-----------( 369      ( 29 Mar 2023 05:59 )             33.5         139  |  108  |  12  ----------------------------<  139<H>  3.6   |  28  |  0.92    Ca    8.4      29 Mar 2023 05:59  Phos  2.7       Mg     2.3         TPro  6.1  /  Alb  2.1<L>  /  TBili  0.4  /  DBili  x   /  AST  18  /  ALT  13  /  AlkPhos  57        Urinalysis Basic - ( 28 Mar 2023 14:55 )    Color: Brown / Appearance: Slightly Turbid / S.015 / pH: x  Gluc: x / Ketone: Trace  / Bili: Negative / Urobili: 1 mg/dL   Blood: x / Protein: 100 mg/dL / Nitrite: Positive   Leuk Esterase: Trace / RBC: 25-50 /HPF / WBC 6-10 /HPF   Sq Epi: x / Non Sq Epi: x / Bacteria: Trace /HPF      CAPILLARY BLOOD GLUCOSE            RADIOLOGY & ADDITIONAL TESTS:  < from: Xray Esophagram Single Contrast (23 @ 14:12) >  ACC: [id number] EXAM:  XR ESOPH SNGL CON STUDY   ORDERED BY: LEON JUNE     PROCEDURE DATE:  2023          INTERPRETATION:  XR ESOPHAGRAM    HISTORY: Dysphagia.    TECHNIQUE: Single contrast esophagram. Total fluoroscopic time of 1.9   minutes.    FINDINGS:    Lungs are clear on the  view. Cardiomediastinal silhouette within   normal limits. No acute osseous abnormality.    The examination was technically limited secondary to difficulty with   patient positioning; patient was imagedwith the bed at approximately 45   degrees with the patient in the right posterior oblique position.    Mildly dilated proximal and midesophagus. Mild irregularity of the distal   esophageal wall with mild associated narrowing. There is a mild delay in   passage of contrast through the esophagus. Tertiary contractions and   intraesophageal reflux are noted.    There is a small to moderate hiatal hernia.    IMPRESSION:    Mildly dilated proximal and midesophagus with mild narrowing of the   distal esophagus with mild irregularity of esophageal wall; an endoscopy   is recommended for further evaluation if feasible.    Mild delay in passage of barium through the esophagus with tertiary   contractions and intraesophageal reflux.    Small to moderate hiatal hernia.    --- End of Report ---            SURY MCLAUGHLIN MD; Attending Radiologist  This document has been electronically signed. Mar 29 2023  2:56PM    < end of copied text >      Imaging Personally Reviewed:  YES    Consultant(s) Notes Reviewed:   YES    Care Discussed with Consultants :     Plan of care was discussed with patient and /or primary care giver; all questions and concerns were addressed and care was aligned with patient's wishes.

## 2023-03-30 NOTE — SWALLOW BEDSIDE ASSESSMENT ADULT - ASR SWALLOW LINGUAL MOBILITY
+fissured tongue/impaired protrusion/impaired anterior elevation/impaired left lateral movement/impaired right lateral movement

## 2023-03-30 NOTE — DIETITIAN INITIAL EVALUATION ADULT - OTHER INFO
Pt from [facility], confused per chart, Limited intake/wt change history data available at present; Pt asleep when visited today, NPO x 2 to 3d in-house, tolerating crushed medications per nursing, pending Swallow evaluation; GI consulted; Unknown food allergies per Chart;

## 2023-03-30 NOTE — SWALLOW BEDSIDE ASSESSMENT ADULT - ADDITIONAL RECOMMENDATIONS
Safe feeding techniques: feed slowly, alternate small bites & sips, upright during meals & for 45 minutes after.

## 2023-03-30 NOTE — SWALLOW BEDSIDE ASSESSMENT ADULT - SLP PERTINENT HISTORY OF CURRENT PROBLEM
89 year old male from [facility] with PMHx of hypertension, hyperlipidemia. glaucoma, GERD, BPH, hypothyroidism, and prostate cancer, baseline A&Ox1, presenting from NH after difficulty swallowing for x4 weeks. Pt. denies difficulty swallowing during exam, and denies any chest pain, palpitations, headache, urinary discomfort abdominal pain, fever, chills. As per chart review, pt is not a good historian. Was admitted in the past for ESBL E.Coli bacteremia in 4/2020, stay complicated by COVID infection, most recent ED trip s/p mechanical fall 10/2021. S/p esophagram on 3/29: shows mildly dilated proximal and midesophagus. Mild irregularity of the distal

## 2023-03-30 NOTE — SWALLOW BEDSIDE ASSESSMENT ADULT - CONSISTENCIES ADMINISTERED
x3/mildly thick x3/minced & moist x1 cup sip; rapid straw sips/thin liquid x1 bite/regular solid ice x3 x2/soft & bite-sized x4/pureed

## 2023-03-30 NOTE — PROGRESS NOTE ADULT - PROBLEM SELECTOR PLAN 1
H/o dysphagia x 4 weeks according to triage and [facility]  - S/p Barium Swallow shows narrowing and negative for obstruction  - Possible EGD   - NPO w/ IV hydration ?  - S/p Speech and Swallow and diet advanced per recommendation  - GI-Dr. Sumner consulted, appreciated H/o dysphagia x 4 weeks according to triage and [facility]  - S/p Barium Swallow showed narrowing and negative for obstruction  - Possible EGD-f/u GI   - NPO w/ IV hydration.  S/p NPO. Diet advanced but lunch uneaten at bedside.  Therefore, C/w IVF until pt eating.  F/u IVF in AM   - S/p Speech and Swallow and diet advanced per recommendation  - GI-Dr. Sumner consulted, appreciated

## 2023-03-30 NOTE — SWALLOW BEDSIDE ASSESSMENT ADULT - SWALLOW EVAL: DIAGNOSIS
Pt p/w s&s of presbyphagia c/b prolonged mastication, decreased A-P transport of bolus, mild oral residue, and multiple swallows. Efficient hyolaryngeal elevation upon palpation. Oral residue cleared by independent dry swallows. Pt belching with increased PO trials. No overt s&s of aspiration/penetration observed on any trial. However, pt had delayed cough following exam after lowering HOB, with thick, copious, apparent phlegm; suctioned by SLP.

## 2023-03-30 NOTE — SWALLOW BEDSIDE ASSESSMENT ADULT - SWALLOW EVAL: RECOMMENDED FEEDING/EATING TECHNIQUES
Main upright for at least 45 min/allow for swallow between intakes/alternate food with liquid/check mouth frequently for oral residue/pocketing/oral hygiene/position upright (90 degrees)/small sips/bites

## 2023-03-30 NOTE — PROGRESS NOTE ADULT - SUBJECTIVE AND OBJECTIVE BOX
Patient is a 89y old  Male who presents with a chief complaint of Urinary tract infection     (30 Mar 2023 09:06)    PATIENT IS SEEN AND EXAMINED IN MEDICAL FLOOR.  NGT [    ]    ROOT [   ]      GT [   ]    ALLERGIES:  No Known Allergies      Daily     Daily     VITALS:    Vital Signs Last 24 Hrs  T(C): 36.7 (30 Mar 2023 04:58), Max: 37.4 (29 Mar 2023 20:40)  T(F): 98.1 (30 Mar 2023 04:58), Max: 99.3 (29 Mar 2023 20:40)  HR: 86 (30 Mar 2023 04:58) (86 - 90)  BP: 116/79 (30 Mar 2023 04:58) (102/46 - 137/64)  BP(mean): --  RR: 18 (30 Mar 2023 04:58) (18 - 18)  SpO2: 96% (30 Mar 2023 04:58) (95% - 96%)    Parameters below as of 30 Mar 2023 04:58  Patient On (Oxygen Delivery Method): room air        LABS:    CBC Full  -  ( 29 Mar 2023 05:59 )  WBC Count : 14.03 K/uL  RBC Count : 3.86 M/uL  Hemoglobin : 10.2 g/dL  Hematocrit : 33.5 %  Platelet Count - Automated : 369 K/uL  Mean Cell Volume : 86.8 fl  Mean Cell Hemoglobin : 26.4 pg  Mean Cell Hemoglobin Concentration : 30.4 gm/dL  Auto Neutrophil # : 11.78 K/uL  Auto Lymphocyte # : 1.01 K/uL  Auto Monocyte # : 0.91 K/uL  Auto Eosinophil # : 0.19 K/uL  Auto Basophil # : 0.03 K/uL  Auto Neutrophil % : 83.9 %  Auto Lymphocyte % : 7.2 %  Auto Monocyte % : 6.5 %  Auto Eosinophil % : 1.4 %  Auto Basophil % : 0.2 %    PT/INR - ( 28 Mar 2023 13:45 )   PT: 14.1 sec;   INR: 1.18 ratio         PTT - ( 28 Mar 2023 13:45 )  PTT:38.7 sec  03-29    139  |  108  |  12  ----------------------------<  139<H>  3.6   |  28  |  0.92    Ca    8.4      29 Mar 2023 05:59  Phos  2.7     03-29  Mg     2.3     03-29    TPro  6.1  /  Alb  2.1<L>  /  TBili  0.4  /  DBili  x   /  AST  18  /  ALT  13  /  AlkPhos  57  03-29    CAPILLARY BLOOD GLUCOSE        CARDIAC MARKERS ( 28 Mar 2023 13:45 )  x     / x     / 53 U/L / x     / x          LIVER FUNCTIONS - ( 29 Mar 2023 05:59 )  Alb: 2.1 g/dL / Pro: 6.1 g/dL / ALK PHOS: 57 U/L / ALT: 13 U/L DA / AST: 18 U/L / GGT: x           Creatinine Trend: 0.92<--, 1.09<--  I&O's Summary          .Urine  03-28 @ 14:55   No growth  --  --      .Blood  03-28 @ 13:45   No growth to date.  --  --          MEDICATIONS:    MEDICATIONS  (STANDING):  ascorbic acid 500 milliGRAM(s) Oral daily  aspirin  chewable 81 milliGRAM(s) Oral daily  cefepime   IVPB      cefepime   IVPB 1000 milliGRAM(s) IV Intermittent every 8 hours  dextrose 5% + sodium chloride 0.9%. 1000 milliLiter(s) (75 mL/Hr) IV Continuous <Continuous>  finasteride 5 milliGRAM(s) Oral daily  heparin   Injectable 5000 Unit(s) SubCutaneous every 12 hours  latanoprost 0.005% Ophthalmic Solution 1 Drop(s) Both EYES at bedtime  levothyroxine 25 MICROGram(s) Oral daily  metoprolol succinate ER 25 milliGRAM(s) Oral daily  pantoprazole    Tablet 40 milliGRAM(s) Oral before breakfast  simvastatin 20 milliGRAM(s) Oral at bedtime  tamsulosin 0.4 milliGRAM(s) Oral at bedtime      MEDICATIONS  (PRN):      REVIEW OF SYSTEMS:                           ALL ROS DONE [ X   ]    CONSTITUTIONAL:  LETHARGIC [   ], FEVER [   ], UNRESPONSIVE [   ]  CVS:  CP  [   ], SOB, [   ], PALPITATIONS [   ], DIZZYNESS [   ]  RS: COUGH [   ], SPUTUM [   ]  GI: ABDOMINAL PAIN [   ], NAUSEA [   ], VOMITINGS [   ], DIARRHEA [   ], CONSTIPATION [   ]  :  DYSURIA [   ], NOCTURIA [   ], INCREASED FREQUENCY [   ], DRIBLING [   ],  SKELETAL: PAINFUL JOINTS [   ], SWOLLEN JOINTS [   ], NECK ACHE [   ], LOW BACK ACHE [   ],  SKIN : ULCERS [   ], RASH [   ], ITCHING [   ]  CNS: HEAD ACHE [   ], DOUBLE VISION [   ], BLURRED VISION [   ], AMS / CONFUSION [   ], SEIZURES [   ], WEAKNESS [   ],TINGLING / NUMBNESS [   ]      PHYSICAL EXAMINATION:  GENERAL APPEARANCE: NO DISTRESS  HEENT:  NO PALLOR, NO  JVD,  NO   NODES, NECK SUPPLE  CVS: S1 +, S2 +,   RS: AEEB,  OCCASIONAL  RALES +,   NO RONCHI  ABD: SOFT, NT, NO, BS +  EXT: NO PE  SKIN: WARM,   SKELETAL:  ROM ACCEPTABLE  CNS:  AAO X 1 ,   DEFICITS    RADIOLOGY :      ASSESSMENT :     Urinary tract infection    No pertinent past medical history    Hypothyroidism    HTN (hypertension)    HLD (hyperlipidemia)    Glaucoma    GERD (gastroesophageal reflux disease)    BPH (benign prostatic hyperplasia)    Prostate cancer    No significant past surgical history        PLAN:  HPI:  87 year old male from [facility] with PMHx of hypertension, hyperlipidemia. glaucoma, GERD, BPH, hypothyroidism, and prostate cancer, baseline A&Ox1, presenting from NH after difficulty swallowing for x4 weeks. Pt. denies difficulty swallowing during exam, and denies any chest pain, palpitations, headache, urinary discomfort abdominal pain, fever, chills. Only oriented to self, thinks he is in "[facility]", and said the year was "3000". Pt is not a good historian, was unable to get in contact with HCP Ene Serrano. Was admitted in the past for ESBL E.Coli bacteremia in 4/2020, stay complicated by COVID infection, most recent ED trip s/p mechanical fall 10/2021.     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   Patient is a 89y old  Male who presents with a chief complaint of Urinary tract infection     (30 Mar 2023 09:06)    PATIENT IS SEEN AND EXAMINED IN MEDICAL FLOOR.    ALLERGIES:  No Known Allergies    VITALS:    Vital Signs Last 24 Hrs  T(C): 36.7 (30 Mar 2023 04:58), Max: 37.4 (29 Mar 2023 20:40)  T(F): 98.1 (30 Mar 2023 04:58), Max: 99.3 (29 Mar 2023 20:40)  HR: 86 (30 Mar 2023 04:58) (86 - 90)  BP: 116/79 (30 Mar 2023 04:58) (102/46 - 137/64)  BP(mean): --  RR: 18 (30 Mar 2023 04:58) (18 - 18)  SpO2: 96% (30 Mar 2023 04:58) (95% - 96%)    Parameters below as of 30 Mar 2023 04:58  Patient On (Oxygen Delivery Method): room air        LABS:    CBC Full  -  ( 29 Mar 2023 05:59 )  WBC Count : 14.03 K/uL  RBC Count : 3.86 M/uL  Hemoglobin : 10.2 g/dL  Hematocrit : 33.5 %  Platelet Count - Automated : 369 K/uL  Mean Cell Volume : 86.8 fl  Mean Cell Hemoglobin : 26.4 pg  Mean Cell Hemoglobin Concentration : 30.4 gm/dL  Auto Neutrophil # : 11.78 K/uL  Auto Lymphocyte # : 1.01 K/uL  Auto Monocyte # : 0.91 K/uL  Auto Eosinophil # : 0.19 K/uL  Auto Basophil # : 0.03 K/uL  Auto Neutrophil % : 83.9 %  Auto Lymphocyte % : 7.2 %  Auto Monocyte % : 6.5 %  Auto Eosinophil % : 1.4 %  Auto Basophil % : 0.2 %    PT/INR - ( 28 Mar 2023 13:45 )   PT: 14.1 sec;   INR: 1.18 ratio         PTT - ( 28 Mar 2023 13:45 )  PTT:38.7 sec  03-29    139  |  108  |  12  ----------------------------<  139<H>  3.6   |  28  |  0.92    Ca    8.4      29 Mar 2023 05:59  Phos  2.7     03-29  Mg     2.3     03-29    TPro  6.1  /  Alb  2.1<L>  /  TBili  0.4  /  DBili  x   /  AST  18  /  ALT  13  /  AlkPhos  57  03-29    CAPILLARY BLOOD GLUCOSE        CARDIAC MARKERS ( 28 Mar 2023 13:45 )  x     / x     / 53 U/L / x     / x          LIVER FUNCTIONS - ( 29 Mar 2023 05:59 )  Alb: 2.1 g/dL / Pro: 6.1 g/dL / ALK PHOS: 57 U/L / ALT: 13 U/L DA / AST: 18 U/L / GGT: x           Creatinine Trend: 0.92<--, 1.09<--  I&O's Summary          .Urine  03-28 @ 14:55   No growth  --  --      .Blood  03-28 @ 13:45   No growth to date.  --  --          MEDICATIONS:    MEDICATIONS  (STANDING):  ascorbic acid 500 milliGRAM(s) Oral daily  aspirin  chewable 81 milliGRAM(s) Oral daily  cefepime   IVPB      cefepime   IVPB 1000 milliGRAM(s) IV Intermittent every 8 hours  dextrose 5% + sodium chloride 0.9%. 1000 milliLiter(s) (75 mL/Hr) IV Continuous <Continuous>  finasteride 5 milliGRAM(s) Oral daily  heparin   Injectable 5000 Unit(s) SubCutaneous every 12 hours  latanoprost 0.005% Ophthalmic Solution 1 Drop(s) Both EYES at bedtime  levothyroxine 25 MICROGram(s) Oral daily  metoprolol succinate ER 25 milliGRAM(s) Oral daily  pantoprazole    Tablet 40 milliGRAM(s) Oral before breakfast  simvastatin 20 milliGRAM(s) Oral at bedtime  tamsulosin 0.4 milliGRAM(s) Oral at bedtime      MEDICATIONS  (PRN):      REVIEW OF SYSTEMS:                           ALL ROS DONE [ X   ]    CONSTITUTIONAL:  LETHARGIC [   ], FEVER [   ], UNRESPONSIVE [   ]  CVS:  CP  [   ], SOB, [   ], PALPITATIONS [   ], DIZZYNESS [   ]  RS: COUGH [   ], SPUTUM [   ]  GI: ABDOMINAL PAIN [   ], NAUSEA [   ], VOMITINGS [   ], DIARRHEA [   ], CONSTIPATION [   ]  :  DYSURIA [   ], NOCTURIA [   ], INCREASED FREQUENCY [   ], DRIBLING [   ],  SKELETAL: PAINFUL JOINTS [   ], SWOLLEN JOINTS [   ], NECK ACHE [   ], LOW BACK ACHE [   ],  SKIN : ULCERS [   ], RASH [   ], ITCHING [   ]  CNS: HEAD ACHE [   ], DOUBLE VISION [   ], BLURRED VISION [   ], AMS / CONFUSION [   ], SEIZURES [   ], WEAKNESS [   ],TINGLING / NUMBNESS [   ]      PHYSICAL EXAMINATION:  GENERAL APPEARANCE: NO DISTRESS  HEENT:  NO PALLOR, NO  JVD,  NO   NODES, NECK SUPPLE  CVS: S1 +, S2 +,   RS: AEEB,  OCCASIONAL  RALES +,   NO RONCHI  ABD: SOFT, NT, NO, BS +  EXT: NO PE  SKIN: WARM,   SKELETAL:  ROM ACCEPTABLE  CNS:  AAO X 1 ,   DEFICITS    RADIOLOGY :    RADIOLOGY AND READINGS REVIEWED      ASSESSMENT :     Urinary tract infection    No pertinent past medical history    Hypothyroidism    HTN (hypertension)    HLD (hyperlipidemia)    Glaucoma    GERD (gastroesophageal reflux disease)    BPH (benign prostatic hyperplasia)    Prostate cancer    No significant past surgical history        PLAN:  HPI:  87 year old male from [facility] with PMHx of hypertension, hyperlipidemia. glaucoma, GERD, BPH, hypothyroidism, and prostate cancer, baseline A&Ox1, presenting from NH after difficulty swallowing for x4 weeks. Pt. denies difficulty swallowing during exam, and denies any chest pain, palpitations, headache, urinary discomfort abdominal pain, fever, chills. Only oriented to self, thinks he is in "[facility]", and said the year was "3000". Pt is not a good historian, was unable to get in contact with HCP Ene Serrano. Was admitted in the past for ESBL E.Coli bacteremia in 4/2020, stay complicated by COVID infection, most recent ED trip s/p mechanical fall 10/2021.     ED vitals:154/78 BP, 17 RR, 74 HR, 98.1 F  Labs: WC 19.83, BMP wnl, UA pos nitrites, leuk esterase, 6-10 WBC, 25-50 RBCs   BCx and UCx pending  Barium swallow Xray pending  (28 Mar 2023 19:14)    # SEPSIS S/T SUSPECTED UTI, HX OF ESBL ECOLI UTI  - SWITCHED CEFEPIME TO ROCEPHIN, F/U BCX AND UCX - NGTD  - ID CONSULT    # DYSPHAGIA  - NPO  - IVF  - ASPIRATION PRECAUTIONS  - ST EVAL - SOFT AND BITE SIZED AND THIN LIQUIDS  - F/U BARIUM ESOPHAGRAM - MILDLY DILATED PROXIMAL AND MIDESOPHAGUS WITH MILD NARROWING OF THE DISTAL ESOPHAGUS WITH MILD IRREGULARITY OF ESOPHAGEAL WALL. MILD DELAY IN PASSAGE OF BARIUM THROUGH THE ESOPHAGUS WITH TERTIARY CONTRACTIONS AND INTRA-ESOPHAGEAL REFLUX.  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

## 2023-03-30 NOTE — DIETITIAN INITIAL EVALUATION ADULT - PERTINENT LABORATORY DATA
03-29    139  |  108  |  12  ----------------------------<  139<H>  3.6   |  28  |  0.92    Ca    8.4      29 Mar 2023 05:59  Phos  2.7     03-29  Mg     2.3     03-29    TPro  6.1  /  Alb  2.1<L>  /  TBili  0.4  /  DBili  x   /  AST  18  /  ALT  13  /  AlkPhos  57  03-29

## 2023-03-30 NOTE — DIETITIAN INITIAL EVALUATION ADULT - PROBLEM SELECTOR PLAN 2
- denies any symptoms, however A&Ox1 (likely baseline), and positive UA for leuk esterase, WC 6-10, RBC 25-20, nitrites   - hx of ESBL E.Coli bacteremia admission 4/2020 treated with 10 day course of zosyn   - f/u UCx and BCx   - s/p ceftriaxone and azithro  - start meropenem 1g q8 (starting 3/29) for empiric treatment  - Dr. Calhoun ID consulted

## 2023-03-30 NOTE — PROGRESS NOTE ADULT - ASSESSMENT
Subjective: NAD, afebrile, clinically stable, no N/V or diarrhea, he denies any pain, unable to eat due to dysphagia but other than that he is not in any distress, voiding well, no dysuria.     REVIEW OF SYSTEMS:  CONSTITUTIONAL:  No weakness, fevers or chills  EYES/ENT:  No visual changes;  No vertigo or throat pain   NECK:  No pain or stiffness  RESPIRATORY:  No cough, wheezing, hemoptysis; No shortness of breath  CARDIOVASCULAR:  No chest pain or palpitations  GASTROINTESTINAL: + swallowing difficulty, no abdominal or epigastric pain. No nausea, vomiting, or hematemesis; No diarrhea or constipation. No melena or hematochezia.  GENITOURINARY:  No dysuria, frequency or hematuria  NEUROLOGICAL:  No numbness or weakness  MSK: no back pain, no joint pain  SKIN:  No itching, rashes    PE:  Vital Signs Last 24 Hrs  T(C): 36.9 (30 Mar 2023 14:47), Max: 37.4 (29 Mar 2023 20:40)  T(F): 98.4 (30 Mar 2023 14:47), Max: 99.3 (29 Mar 2023 20:40)  HR: 86 (30 Mar 2023 14:47) (86 - 89)  BP: 130/72 (30 Mar 2023 14:47) (102/46 - 130/72)  RR: 18 (30 Mar 2023 14:47) (18 - 18)  SpO2: 99% (30 Mar 2023 14:47) (95% - 99%)    Parameters below as of 30 Mar 2023 14:47 Patient On (Oxygen Delivery Method): room air    Gen: AOx2, NAD, non-toxic, pleasant  HEAD:  Atraumatic, Normocephalic  EYES: EOMI, PERRLA, conjunctiva and sclera clear  ENT: Moist mucous membranes  NECK: Supple, No JVD  CV: S1+S2 normal, no murmur  Resp: Clear bilat, no resp distress, no crackles/wheezes  Abd: Soft, nontender, +BS  Ext: No LE edema, no wounds  : No Khanna  IV/Skin: No thrombophlebitis  Msk: No low back pain, no arthralgias, no joint swelling  Neuro: AAOx2. No sensory deficits, no motor deficits  Psych: no anxiety, no delusional ideas, no suicidal ideation    LABS:                        10.2   14.03 )-----------( 369      ( 29 Mar 2023 05:59 )             33.5     03-29    139  |  108  |  12  ----------------------------<  139<H>  3.6   |  28  |  0.92    Ca    8.4      29 Mar 2023 05:59  Phos  2.7     03-29  Mg     2.3     03-29    TPro  6.1  /  Alb  2.1<L>  /  TBili  0.4  /  DBili  x   /  AST  18  /  ALT  13  /  AlkPhos  57  03-29    MICROBIOLOGY:  v  .Urine  03-28-23   No growth  --  --      .Blood  03-28-23   No growth to date.  --  --    Rapid RVP Result: NotDetec (03-28 @ 13:45)    RADIOLOGY: no new imaging o review    ANTIBIOTICS:  s/p meropenem(3/28-3/29)  Cefepime 3/29-    IMPRESSION:  86 yo male from [facility] with H/O HTN, HLD, GERD, BPH, hypothyroidism, prostate CA sent from NH due to dysphagia, no reported fever or chills, no septic.  AAOx1, weak elderly  WBC Count: 19.83 K/uL (03.28.23 @ 13:45)--> WBC Count: 14.03 K/uL (03.29.23 @ 05:59) admission labs look hemoconcentrated  UA +, unclear if urinary symptoms    -Acute UTI(cystisis) mild, unclear if the samples collected before abx in ED.    -Dysphagia(undergoing GI lezama)  -H/O prostate CA  -Dementia  -NH resident    PLAN:  change cefepime to ceftriaxone 1g q24 hrs(total course 3 days from first day abx were started)  Aspiration precautions  Dysphagia lezama as per primary team, GI planning EGD  Nutrition consult  Discussed with Medicine attending  ID will sign off     Please reach ID with any questions or concerns  Dr. Bridgette Gurrola  Available in Teams

## 2023-03-30 NOTE — PROGRESS NOTE ADULT - PROBLEM SELECTOR PLAN 2
- On admission denied symptoms, however A&Ox1 (likely baseline)  - (+) UA   - H/o ESBL E.Coli bacteremia admission 4/2020 treated with 10 day course of zosyn and started on Meropenem.    - S/p Ceftriaxone, Azithromycin, & Meropenem.    - UCx and BCx negative. Currently on Cefepime. ? de-escalate Cefepime-awaiting confirmation  - ID-Dr. Calhoun consulted, appreciated - On admission denied symptoms, however A&Ox1 (likely baseline)  - (+) UA   - H/o ESBL E.Coli bacteremia admission 4/2020 treated with 10 day course of zosyn and started on Meropenem.    - S/p Ceftriaxone, Azithromycin, & Meropenem.    - UCx and BCx negative. Currently on Cefepime.  De-escalated abx. Per ID, change to Ceftriaxone x 1 day (last day 3/31) that would complete 3d of abx treatment.    - ID-Dr. Calhoun consulted, appreciated - On admission denied symptoms, however A&Ox1 (likely baseline)  - (+) UA   - H/o ESBL E.Coli bacteremia admission 4/2020 treated with 10 day course of zosyn and started on Meropenem.    - S/p Ceftriaxone, Azithromycin, & Meropenem.    - UCx and BCx negative. Currently on Cefepime.  De-escalated abx. Per ID, change to Ceftriaxone x 1 day (last day 3/31 for total=3d of abx tx) that would complete 3d of abx treatment.    - ID-Dr. Calhoun consulted, appreciated

## 2023-03-30 NOTE — SWALLOW BEDSIDE ASSESSMENT ADULT - ORAL PHASE
Decreased anterior-posterior movement of the bolus Within functional limits mild oral residue; cleared by independent dry swallows mild oral residue; cleared by multiple swallows and liquid wash/Decreased anterior-posterior movement of the bolus/Delayed oral transit time mild oral residue; cleared by independent dry swallows/Decreased anterior-posterior movement of the bolus

## 2023-03-30 NOTE — PROGRESS NOTE ADULT - ASSESSMENT
87 year old male from [facility] with PMHx of hypertension, hyperlipidemia. glaucoma, GERD, BPH, hypothyroidism, and prostate cancer, baseline A&Ox1.  Presented to the ED from [facility] after difficulty swallowing for x 4 weeks. Pt denied difficulty swallowing, chest pain, palpitations, headache, urinary discomfort abdominal pain, fever, chills. Admitted for Dysphagia workup.   87 year old male from [facility] with PMHx of hypertension, hyperlipidemia. glaucoma, GERD, BPH, hypothyroidism, and prostate cancer, baseline A&Ox1.  Presented to the ED from [facility] after difficulty swallowing for x 4 weeks. Pt denied difficulty swallowing, chest pain, palpitations, headache, urinary discomfort abdominal pain, fever, chills. Admitted for Dysphagia workup & UTI.

## 2023-03-30 NOTE — DIETITIAN INITIAL EVALUATION ADULT - FACTORS AFF FOOD INTAKE
acute on chronic comorbidities with dysphagia x 4wks PTA/change in mental status/difficulty swallowing

## 2023-03-30 NOTE — DIETITIAN INITIAL EVALUATION ADULT - PERTINENT MEDS FT
MEDICATIONS  (STANDING):  ascorbic acid 500 milliGRAM(s) Oral daily  aspirin  chewable 81 milliGRAM(s) Oral daily  cefepime   IVPB      cefepime   IVPB 1000 milliGRAM(s) IV Intermittent every 8 hours  dextrose 5% + sodium chloride 0.9%. 1000 milliLiter(s) (75 mL/Hr) IV Continuous <Continuous>  finasteride 5 milliGRAM(s) Oral daily  heparin   Injectable 5000 Unit(s) SubCutaneous every 12 hours  latanoprost 0.005% Ophthalmic Solution 1 Drop(s) Both EYES at bedtime  levothyroxine 25 MICROGram(s) Oral daily  metoprolol succinate ER 25 milliGRAM(s) Oral daily  pantoprazole    Tablet 40 milliGRAM(s) Oral before breakfast  simvastatin 20 milliGRAM(s) Oral at bedtime  tamsulosin 0.4 milliGRAM(s) Oral at bedtime    MEDICATIONS  (PRN):

## 2023-03-30 NOTE — PROGRESS NOTE ADULT - ASSESSMENT
Patient is an 89M with a PMHx of HTn, HLD, glaucoma, GERD, BPH, hypothyroidism, an dprostate CA, baseline A&O x 1, who presented from [facility] with dysphagia x 4 weeks. GI was consulted for dysphagia.    Patient is a poor historian, does not answer questions appropriately, collateral obtained from chart review.   Presented to the ED 10/20/21 s/p fall. Previous admission 4/10/20-4/20/20 for sob, found to have Covid-19 as well as UTI and ESBL e.coli bacteremia s/p Zosyn x 10 days. He endorses thirst otherwise offers no acute complaints, denies abd pain/tenderness.     In the ED, labs notable for: leukocytosis 19.83 with neutrophil dominance 86.8, plt 436, CMP largely unremarkable.     #Dysphagia  #GERD  Unclear if patient has had prior EGD/colo, no report available per chart review nor on [facility]. Hx of GERD. Differentials include stricture vs motility-related dysphagia (achalasia) vs reflux esophagitis vs malignancy vs esophageal diverticula vs others. Patient may benefit from endoscopic evaluation however given advanced age and mental status, will re-evaluate intervention pending esophagram and SLP evaluation prior. Patient is on abx for UTI, pending UCx and BCx. Afebrile. Esophagram showed mildly dilated proximal & midesophagus with mild narrowing of distalesophagus with mild irregularity of esophageal wall, mild delay in passage of barium through esophagus with tertiary contractions and intraesophageal reflux, small to moderate HH.   3/30: SLP at bedside, patient initially performed well however noted to cough with sputum production. Attempted to contact HCP lizette pena to obtain patient's history & provide update via two numbers on file, incorrect numbers for both.     	- s/p SLP eval  	- s/p Esophagram with dilated proximal & midesophagus and narrowing of distal esophagus with irregularity of esophageal wall, delay in passage of barium through esophagus  	- Patient may benefit from endoscopic evaluation, TBD   	- Consider IV Protonix 40mg daily given high risk of aspiration    This note and its recommendations herein are preliminary until such time as cosigned by an attending.     Patient is an 89M with a PMHx of HTn, HLD, glaucoma, GERD, BPH, hypothyroidism, an dprostate CA, baseline A&O x 1, who presented from [facility] with dysphagia x 4 weeks. GI was consulted for dysphagia.    Patient is a poor historian, does not answer questions appropriately, collateral obtained from chart review.   Presented to the ED 10/20/21 s/p fall. Previous admission 4/10/20-4/20/20 for sob, found to have Covid-19 as well as UTI and ESBL e.coli bacteremia s/p Zosyn x 10 days. He endorses thirst otherwise offers no acute complaints, denies abd pain/tenderness.     In the ED, labs notable for: leukocytosis 19.83 with neutrophil dominance 86.8, plt 436, CMP largely unremarkable.     #Dysphagia  #GERD  Unclear if patient has had prior EGD/colo, no report available per chart review nor on [facility]. Hx of GERD. Differentials include stricture vs motility-related dysphagia (achalasia) vs reflux esophagitis vs malignancy vs esophageal diverticula vs others. Patient may benefit from endoscopic evaluation however given advanced age and mental status, will re-evaluate intervention pending esophagram and SLP evaluation prior. Patient is on abx for UTI, pending UCx and BCx. Afebrile. Esophagram showed mildly dilated proximal & midesophagus with mild narrowing of distalesophagus with mild irregularity of esophageal wall, mild delay in passage of barium through esophagus with tertiary contractions and intraesophageal reflux, small to moderate HH.   3/30: SLP at bedside, patient initially performed well however noted to cough with sputum production. Attempted to contact HCP lizette pena to obtain patient's history & provide update via two numbers on file, incorrect numbers for both.     	- s/p SLP eval  	- s/p Esophagram with dilated proximal & midesophagus and narrowing of distal esophagus with irregularity of esophageal wall, delay in passage of barium through esophagus  	- Patient may benefit from endoscopic evaluation, TBD   	- If unable to get in contact with patient's HCP for EGD consent, patient will require 2-physician consent for tentative EGD, TBD  	- Consider IV Protonix 40mg daily given high risk of aspiration    This note and its recommendations herein are preliminary until such time as cosigned by an attending.     Patient is an 89M with a PMHx of HTn, HLD, glaucoma, GERD, BPH, hypothyroidism, an dprostate CA, baseline A&O x 1, who presented from [facility] with dysphagia x 4 weeks. GI was consulted for dysphagia.    Patient is a poor historian, does not answer questions appropriately, collateral obtained from chart review.   Presented to the ED 10/20/21 s/p fall. Previous admission 4/10/20-4/20/20 for sob, found to have Covid-19 as well as UTI and ESBL e.coli bacteremia s/p Zosyn x 10 days. He endorses thirst otherwise offers no acute complaints, denies abd pain/tenderness.     In the ED, labs notable for: leukocytosis 19.83 with neutrophil dominance 86.8, plt 436, CMP largely unremarkable.     #Dysphagia  #GERD  Unclear if patient has had prior EGD/colo, no report available per chart review nor on [facility]. Hx of GERD. Differentials include stricture vs motility-related dysphagia (achalasia) vs reflux esophagitis vs malignancy vs esophageal diverticula vs others. Patient may benefit from endoscopic evaluation however given advanced age and mental status, will re-evaluate intervention pending esophagram and SLP evaluation prior. Patient is on abx for UTI, pending UCx and BCx. Afebrile. Esophagram showed mildly dilated proximal & midesophagus with mild narrowing of distalesophagus with mild irregularity of esophageal wall, mild delay in passage of barium through esophagus with tertiary contractions and intraesophageal reflux, small to moderate HH.   3/30: SLP at bedside, patient initially performed well however noted to cough with sputum production. Attempted to contact HCP lizette pena to obtain patient's history & provide update via two numbers on file, incorrect numbers for both.     	- s/p SLP eval  	- s/p Esophagram with dilated proximal & midesophagus and narrowing of distal esophagus with irregularity of esophageal wall, delay in passage of barium through esophagus  	- Patient may benefit from endoscopic evaluation, TBD   	- If unable to get in contact with patient's HCP for EGD consent, patient will require 2-physician consent for tentative EGD (plan to schedule EGD once consent is on file)  	- Consider IV Protonix 40mg daily given high risk of aspiration  Plan communicated to attending.    This note and its recommendations herein are preliminary until such time as cosigned by an attending.

## 2023-03-30 NOTE — SWALLOW BEDSIDE ASSESSMENT ADULT - COMMENTS
Following exam SLP lowered HOB; pt had delayed cough with thick, copious, apparent phlegm; suctioned by SLP. HOB elevated to 90°. AA+O1-2; pt verbal, inconsistently followed directions and answered SLP queries. +Fissured tongue, +edentulous, dependent for feeding. Pt contracted. Denies any pain/difficulty swallowing. Throat clear x1 on cup sip; may have been episodic.

## 2023-03-31 ENCOUNTER — TRANSCRIPTION ENCOUNTER (OUTPATIENT)
Age: 88
End: 2023-03-31

## 2023-03-31 DIAGNOSIS — E87.6 HYPOKALEMIA: ICD-10-CM

## 2023-03-31 LAB
ANION GAP SERPL CALC-SCNC: 4 MMOL/L — LOW (ref 5–17)
BASOPHILS # BLD AUTO: 0.02 K/UL — SIGNIFICANT CHANGE UP (ref 0–0.2)
BASOPHILS NFR BLD AUTO: 0.2 % — SIGNIFICANT CHANGE UP (ref 0–2)
BUN SERPL-MCNC: 7 MG/DL — SIGNIFICANT CHANGE UP (ref 7–18)
CALCIUM SERPL-MCNC: 8.5 MG/DL — SIGNIFICANT CHANGE UP (ref 8.4–10.5)
CHLORIDE SERPL-SCNC: 111 MMOL/L — HIGH (ref 96–108)
CO2 SERPL-SCNC: 25 MMOL/L — SIGNIFICANT CHANGE UP (ref 22–31)
CREAT SERPL-MCNC: 0.86 MG/DL — SIGNIFICANT CHANGE UP (ref 0.5–1.3)
EGFR: 83 ML/MIN/1.73M2 — SIGNIFICANT CHANGE UP
EOSINOPHIL # BLD AUTO: 0.29 K/UL — SIGNIFICANT CHANGE UP (ref 0–0.5)
EOSINOPHIL NFR BLD AUTO: 2.9 % — SIGNIFICANT CHANGE UP (ref 0–6)
GLUCOSE SERPL-MCNC: 128 MG/DL — HIGH (ref 70–99)
HCT VFR BLD CALC: 33.2 % — LOW (ref 39–50)
HGB BLD-MCNC: 10.3 G/DL — LOW (ref 13–17)
IMM GRANULOCYTES NFR BLD AUTO: 0.9 % — SIGNIFICANT CHANGE UP (ref 0–0.9)
LYMPHOCYTES # BLD AUTO: 0.94 K/UL — LOW (ref 1–3.3)
LYMPHOCYTES # BLD AUTO: 9.3 % — LOW (ref 13–44)
MCHC RBC-ENTMCNC: 27 PG — SIGNIFICANT CHANGE UP (ref 27–34)
MCHC RBC-ENTMCNC: 31 GM/DL — LOW (ref 32–36)
MCV RBC AUTO: 86.9 FL — SIGNIFICANT CHANGE UP (ref 80–100)
MONOCYTES # BLD AUTO: 0.61 K/UL — SIGNIFICANT CHANGE UP (ref 0–0.9)
MONOCYTES NFR BLD AUTO: 6 % — SIGNIFICANT CHANGE UP (ref 2–14)
NEUTROPHILS # BLD AUTO: 8.14 K/UL — HIGH (ref 1.8–7.4)
NEUTROPHILS NFR BLD AUTO: 80.7 % — HIGH (ref 43–77)
NRBC # BLD: 0 /100 WBCS — SIGNIFICANT CHANGE UP (ref 0–0)
PLATELET # BLD AUTO: 387 K/UL — SIGNIFICANT CHANGE UP (ref 150–400)
POTASSIUM SERPL-MCNC: 3.2 MMOL/L — LOW (ref 3.5–5.3)
POTASSIUM SERPL-SCNC: 3.2 MMOL/L — LOW (ref 3.5–5.3)
RBC # BLD: 3.82 M/UL — LOW (ref 4.2–5.8)
RBC # FLD: 15.9 % — HIGH (ref 10.3–14.5)
SODIUM SERPL-SCNC: 140 MMOL/L — SIGNIFICANT CHANGE UP (ref 135–145)
WBC # BLD: 10.09 K/UL — SIGNIFICANT CHANGE UP (ref 3.8–10.5)
WBC # FLD AUTO: 10.09 K/UL — SIGNIFICANT CHANGE UP (ref 3.8–10.5)

## 2023-03-31 RX ORDER — POTASSIUM CHLORIDE 20 MEQ
10 PACKET (EA) ORAL ONCE
Refills: 0 | Status: COMPLETED | OUTPATIENT
Start: 2023-03-31 | End: 2023-03-31

## 2023-03-31 RX ADMIN — SODIUM CHLORIDE 75 MILLILITER(S): 9 INJECTION, SOLUTION INTRAVENOUS at 00:08

## 2023-03-31 RX ADMIN — HEPARIN SODIUM 5000 UNIT(S): 5000 INJECTION INTRAVENOUS; SUBCUTANEOUS at 17:04

## 2023-03-31 RX ADMIN — SIMVASTATIN 20 MILLIGRAM(S): 20 TABLET, FILM COATED ORAL at 21:58

## 2023-03-31 RX ADMIN — Medication 500 MILLIGRAM(S): at 11:24

## 2023-03-31 RX ADMIN — FINASTERIDE 5 MILLIGRAM(S): 5 TABLET, FILM COATED ORAL at 11:24

## 2023-03-31 RX ADMIN — Medication 25 MICROGRAM(S): at 05:35

## 2023-03-31 RX ADMIN — PANTOPRAZOLE SODIUM 40 MILLIGRAM(S): 20 TABLET, DELAYED RELEASE ORAL at 05:39

## 2023-03-31 RX ADMIN — Medication 200 MILLIGRAM(S): at 17:05

## 2023-03-31 RX ADMIN — LATANOPROST 1 DROP(S): 0.05 SOLUTION/ DROPS OPHTHALMIC; TOPICAL at 21:58

## 2023-03-31 RX ADMIN — Medication 25 MILLIGRAM(S): at 05:37

## 2023-03-31 RX ADMIN — Medication 100 MILLIEQUIVALENT(S): at 11:24

## 2023-03-31 RX ADMIN — Medication 200 MILLIGRAM(S): at 23:21

## 2023-03-31 RX ADMIN — CEFTRIAXONE 100 MILLIGRAM(S): 500 INJECTION, POWDER, FOR SOLUTION INTRAMUSCULAR; INTRAVENOUS at 05:39

## 2023-03-31 RX ADMIN — TAMSULOSIN HYDROCHLORIDE 0.4 MILLIGRAM(S): 0.4 CAPSULE ORAL at 21:58

## 2023-03-31 RX ADMIN — HEPARIN SODIUM 5000 UNIT(S): 5000 INJECTION INTRAVENOUS; SUBCUTANEOUS at 05:37

## 2023-03-31 NOTE — PHYSICAL THERAPY INITIAL EVALUATION ADULT - GENERAL OBSERVATIONS, REHAB EVAL
male [facility] resident, npo maintain, presented gross deconditioned, requiring assistance with rw for gait and transfers at bedside

## 2023-03-31 NOTE — PROGRESS NOTE ADULT - PROBLEM SELECTOR PLAN 9
- C/w Heparin for DVT ppx  - C/w Protonix for GI ppx Likely from dec po intake  Since NPO, will replete with IV

## 2023-03-31 NOTE — PROGRESS NOTE ADULT - PROBLEM SELECTOR PLAN 10
- Pt from [facility]  - Pending GI work-up  - PT pending-f/u recommendations - C/w Heparin for DVT ppx  - C/w Protonix for GI ppx

## 2023-03-31 NOTE — PROGRESS NOTE ADULT - ASSESSMENT
87 year old male from [facility] with PMHx of hypertension, hyperlipidemia. glaucoma, GERD, BPH, hypothyroidism, and prostate cancer, baseline A&Ox1.  Presented to the ED from [facility] after difficulty swallowing for x 4 weeks. Pt denied difficulty swallowing, chest pain, palpitations, headache, urinary discomfort abdominal pain, fever, chills. Admitted for Dysphagia workup & UTI.

## 2023-03-31 NOTE — DISCHARGE NOTE PROVIDER - CARE PROVIDER_API CALL
Gabe Tomas)  Medicine  [address], [address] [place] [address]  Phone: [phone]  Fax: [phone]  Follow Up Time:

## 2023-03-31 NOTE — DISCHARGE NOTE PROVIDER - NSDCCPCAREPLAN_GEN_ALL_CORE_FT
PRINCIPAL DISCHARGE DIAGNOSIS  Diagnosis: Acute UTI  Assessment and Plan of Treatment: You completed course of antibiotic.   Urine culture showed no growth .   Maintain adequate hydration, nutrition.   Physical therapy   Report to your doctor or seek immediate medical attention if you develop any changes in your condition and or worsening signs/symptoms (such as and not limited : change in mental status associated with  fever, abdominal discomfort, problems with urination ).  Continue further treatment and management per medical staff at rehab.   Follow-up with your primary care physician within 1 week. Call for appointment.        SECONDARY DISCHARGE DIAGNOSES  Diagnosis: Dysphagia  Assessment and Plan of Treatment: Barium Swallow showed narrowing and negative for obstruction   You passed swallow study . Continue  puree diet.   No EGD at this time  in setting of you are  tolerating current diet.  Aspiration precautions  Report to your doctor or seek immediate medical attention if you develop any changes in your condition and or worsening signs/symptoms (such as and not limited : shortness of breath, cough fever ).  Continue further treatment and management per medical staff at rehab.   Follow-up with your primary care physician within 1 week. Call for appointment.      Diagnosis: HTN (hypertension)  Assessment and Plan of Treatment: Continue metoprolol.   Low salt diet  Activity as tolerated.  Take all medication as prescribed.  Follow up with your medical doctor for routine blood pressure monitoring at your next visit.  Notify your doctor if you have any of the following symptoms:   Dizziness, Lightheadedness, Blurry vision, Headache, Chest pain, Shortness of breath      Diagnosis: HLD (hyperlipidemia)  Assessment and Plan of Treatment: Continue statin .  Follow-up with your primary care physician       Diagnosis: Hypothyroidism  Assessment and Plan of Treatment: Continue levothyroxine  Follow-up with your primary care physician      Diagnosis: BPH (benign prostatic hyperplasia)  Assessment and Plan of Treatment: Continue finasteride, tamsulosin.   Report to your doctor or seek immediate medical attention if you develop any changes in your condition and or worsening signs/symptoms (such as and not limited : change in mental status associated with  fever, abdominal discomfort, problems with urination ).  Continue further treatment and management per medical staff at rehab.   Follow-up with your primary care physician      Diagnosis: GERD (gastroesophageal reflux disease)  Assessment and Plan of Treatment: Continue with pantoprazole.   Maintain upright position during and at least 1-2 hours after meals.   Avoid meals at bedtime.   Continue further treatment and management per medical staff at rehab.   Follow-up with your primary care physician

## 2023-03-31 NOTE — PROGRESS NOTE ADULT - PROBLEM SELECTOR PLAN 1
H/o dysphagia x 4 weeks according to triage and [facility]  - S/p Barium Swallow showed narrowing and negative for obstruction  - GI consulted, possible EGD as early as this afternoon, however cannot reach his health care proxy [place] for consent.  - I left her a voicemail at [phone] to call us back.  - S/p Speech and swallow, was rec soft and bite sized. Currently NPO for poss EGD, pt with poor po intake anyway, cont with IVF

## 2023-03-31 NOTE — PROGRESS NOTE ADULT - PROBLEM SELECTOR PLAN 2
- On admission denied symptoms, however A&Ox1 (likely baseline)  - (+) UA   - H/o ESBL E.Coli bacteremia admission 4/2020 treated with 10 day course of zosyn and started on Meropenem.    - S/p Ceftriaxone, Azithromycin, & Meropenem.    - UCx and BCx negative.   - ID Dr. Calhoun on board, completed 3rd day of abx today.

## 2023-03-31 NOTE — PROGRESS NOTE ADULT - SUBJECTIVE AND OBJECTIVE BOX
87 year old male from [facility] with PMHx of hypertension, hyperlipidemia. glaucoma, GERD, BPH, hypothyroidism, and prostate cancer, baseline A&Ox1, p/w dysphagia found to have UTI.     Interval events: NO acute events overnight, pt with po intake, failure to thrive picture.       Allergies    No Known Allergies    Intolerances      Vital Signs Last 24 Hrs  T(C): 37.3 (31 Mar 2023 05:08), Max: 37.4 (30 Mar 2023 21:17)  T(F): 99.1 (31 Mar 2023 05:08), Max: 99.3 (30 Mar 2023 21:17)  HR: 82 (31 Mar 2023 05:36) (82 - 100)  BP: 127/62 (31 Mar 2023 05:36) (122/74 - 130/72)  BP(mean): --  RR: 18 (31 Mar 2023 05:08) (18 - 18)  SpO2: 99% (31 Mar 2023 05:08) (97% - 99%)    Parameters below as of 31 Mar 2023 05:08  Patient On (Oxygen Delivery Method): room air        MedsMEDICATIONS  (STANDING):  ascorbic acid 500 milliGRAM(s) Oral daily  aspirin  chewable 81 milliGRAM(s) Oral daily  dextrose 5% + sodium chloride 0.9%. 1000 milliLiter(s) (75 mL/Hr) IV Continuous <Continuous>  finasteride 5 milliGRAM(s) Oral daily  heparin   Injectable 5000 Unit(s) SubCutaneous every 12 hours  latanoprost 0.005% Ophthalmic Solution 1 Drop(s) Both EYES at bedtime  levothyroxine 25 MICROGram(s) Oral daily  metoprolol succinate ER 25 milliGRAM(s) Oral daily  pantoprazole    Tablet 40 milliGRAM(s) Oral before breakfast  potassium chloride  10 mEq/100 mL IVPB 10 milliEquivalent(s) IV Intermittent once  simvastatin 20 milliGRAM(s) Oral at bedtime  tamsulosin 0.4 milliGRAM(s) Oral at bedtime    MEDICATIONS  (PRN):      PHYSICAL EXAM:  GENERAL: NAD  NEURO: Awake, moves eyes, but did not respond verbally to me.   LUNG: Lungs diminished but clear  HEART: S1, S2, no S3 or S4. Regular rate and rhythm. No murmurs, gallops, or rubs. No JVD  ABDOMEN: Bowel sounds present, abd Soft, Nontender, Nondistended  EXTREMITIES:  2+ Peripheral Pulses b/l, No clubbing, cyanosis, or edema  SKIN: No rashes or lesions    Consultant(s) Notes Reviewed:  [x ] YES  [ ] NO  Care Discussed with Consultants/Other Providers [ x] YES  [ ] NO    LABS:                        10.3   10.09 )-----------( 387      ( 31 Mar 2023 06:00 )             33.2     03-31    140  |  111<H>  |  7   ----------------------------<  128<H>  3.2<L>   |  25  |  0.86    Ca    8.5      31 Mar 2023 06:00          RADIOLOGY & ADDITIONAL TESTS:    EKG:     Hospital Day # 3  87 year old male from [facility] with PMHx of hypertension, hyperlipidemia. glaucoma, GERD, BPH, hypothyroidism, and prostate cancer, baseline A&Ox1, p/w dysphagia found to have UTI.     Interval events: NO acute events overnight, pt with po intake, failure to thrive picture.       Allergies    No Known Allergies    Intolerances      Vital Signs Last 24 Hrs  T(C): 37.3 (31 Mar 2023 05:08), Max: 37.4 (30 Mar 2023 21:17)  T(F): 99.1 (31 Mar 2023 05:08), Max: 99.3 (30 Mar 2023 21:17)  HR: 82 (31 Mar 2023 05:36) (82 - 100)  BP: 127/62 (31 Mar 2023 05:36) (122/74 - 130/72)  BP(mean): --  RR: 18 (31 Mar 2023 05:08) (18 - 18)  SpO2: 99% (31 Mar 2023 05:08) (97% - 99%)    Parameters below as of 31 Mar 2023 05:08  Patient On (Oxygen Delivery Method): room air        MedsMEDICATIONS  (STANDING):  ascorbic acid 500 milliGRAM(s) Oral daily  aspirin  chewable 81 milliGRAM(s) Oral daily  dextrose 5% + sodium chloride 0.9%. 1000 milliLiter(s) (75 mL/Hr) IV Continuous <Continuous>  finasteride 5 milliGRAM(s) Oral daily  heparin   Injectable 5000 Unit(s) SubCutaneous every 12 hours  latanoprost 0.005% Ophthalmic Solution 1 Drop(s) Both EYES at bedtime  levothyroxine 25 MICROGram(s) Oral daily  metoprolol succinate ER 25 milliGRAM(s) Oral daily  pantoprazole    Tablet 40 milliGRAM(s) Oral before breakfast  potassium chloride  10 mEq/100 mL IVPB 10 milliEquivalent(s) IV Intermittent once  simvastatin 20 milliGRAM(s) Oral at bedtime  tamsulosin 0.4 milliGRAM(s) Oral at bedtime    MEDICATIONS  (PRN):      PHYSICAL EXAM:  GENERAL: NAD  NEURO: Awake, moves eyes, but did not respond verbally to me.   LUNG: Lungs diminished but clear  HEART: S1, S2, no S3 or S4. Regular rate and rhythm. No murmurs, gallops, or rubs. No JVD  ABDOMEN: Bowel sounds present, abd Soft, Nontender, Nondistended  EXTREMITIES:  2+ Peripheral Pulses b/l, No clubbing, cyanosis, or edema  SKIN: No rashes or lesions    Consultant(s) Notes Reviewed:  [x ] YES  [ ] NO  Care Discussed with Consultants/Other Providers [ x] YES  [ ] NO    LABS:                        10.3   10.09 )-----------( 387      ( 31 Mar 2023 06:00 )             33.2     03-31    140  |  111<H>  |  7   ----------------------------<  128<H>  3.2<L>   |  25  |  0.86    Ca    8.5      31 Mar 2023 06:00          RADIOLOGY & ADDITIONAL TESTS:    EKG:

## 2023-03-31 NOTE — PHYSICAL THERAPY INITIAL EVALUATION ADULT - LEVEL OF INDEPENDENCE: SCOOT/BRIDGE, REHAB EVAL
minimum assist (75% patients effort)

## 2023-03-31 NOTE — DISCHARGE NOTE PROVIDER - HOSPITAL COURSE
87 year old male from [facility] with PMHx of hypertension, hyperlipidemia. glaucoma, GERD, BPH, hypothyroidism, and prostate cancer, baseline A&Ox1, presenting from NH after difficulty swallowing for x4 weeks. Pt. denies difficulty swallowing during exam, and denies any chest pain, palpitations, headache, urinary discomfort abdominal pain, fever, chills. Only oriented to self, thinks he is in "[facility]", and said the year was "3000". Pt is not a good historian, was unable to get in contact with HCP Ene Serrano. Was admitted in the past for ESBL E.Coli bacteremia in 4/2020, stay complicated by COVID infection, most recent ED trip s/p mechanical fall 10/2021.   In the ED: /78, RR 17, HR 74, Temp 98.1, WBC to 19k, +UA, admitted for dysphagia and UTI  Hospital Course by Problem:  #UTI  Given hx of ESBL e.coli bactermia, was started on meropenum, cefepime.  Seen by Infectious disease, changed to ceftriaxone and completed 3 days total of antiobitics  Urine culture showed no growth  Blood cultures showed no growth  WBC normalized    #Dysphagia  Pt was kept NPO  Barium swallow showed mildly dilated prox and mid esophagus with mild narrowing of distal esoph, mild irregularity of esoph wall, rec endoscopy  Also mild delay in passage of barium through esophagus  Seen by speech and swallow and pt passed for soft and bite sized with thin liquids    #Deconditioning  Seen by PT and rec sub acute rehab     87 year old male from [facility] with PMHx of hypertension, hyperlipidemia. glaucoma, GERD, BPH, hypothyroidism, and prostate cancer, baseline A&Ox1, ESBL E coli bacteremia in  2020, complicated by COVID infection. Most recent ED visit s/p mechanical fall 10/2021; who presented from  NH after difficulty swallowing for x4 weeks. Pt is not a good historian. Unable to contact with HCP Ene Serrano.    In the ED: /78, RR 17, HR 74, Temp 98.1, WBC to 19k, +UA . Pt  admitted for dysphagia and UTI. Given hx of ESBL e.coli bactermia, pt was started on meropenum, cefepime.  Seen by Infectious disease, changed to ceftriaxone and completed 3 days total of antiobitics. Urine culture showed no growth. Blood cultures showed no growth. Leukocytosis normalized  Pt was kept NPO due to dysphagia. Barium swallow showed mildly dilated prox and mid esophagus with mild narrowing of distal esoph, mild irregularity of esoph wall, rec endoscopy  Also mild delay in passage of barium through esophagus. Seen by speech and swallow and pt passed for soft and bite sized with thin liquids. Seen by GI who recommended EGD. Unable to obtain consent .  HCP Ene Gutiérrez   confirmed by Virginia's spouse, Armen Gutiérrez who is close friend of pt and is agreeable to be pt's emergency contact. SW following .   EGD cancelled in setting of pt tolerating diet.   Pt deconditioned ,  seen by PT and rec sub acute rehab.     Pt is medically optimized for discharge. Continue medications as instructed. Follow-up with PCP.   This is brief summary of patient's hospitalization. Refer to medical record for complete details of hospital course.

## 2023-03-31 NOTE — DISCHARGE NOTE PROVIDER - NSDCMRMEDTOKEN_GEN_ALL_CORE_FT
aspirin 81 mg oral tablet: 1 orally once a day  finasteride 5 mg oral tablet: 1 tab(s) orally once a day  latanoprost 0.005% ophthalmic solution: 1 in each eye once a day  levothyroxine 25 mcg (0.025 mg) oral tablet: 1 tab(s) orally once a day  metoprolol succinate 50 mg oral capsule, extended release: 1 orally once a day  pantoprazole 40 mg oral delayed release tablet: 1 orally once a day  simvastatin 20 mg oral tablet: 1 tab(s) orally once a day (at bedtime)  tamsulosin 0.4 mg oral capsule: 1 cap(s) orally once a day  triamcinolone 0.1% topical cream: Apply topically to affected area 2 times a day  Vitamin C 500 mg oral capsule: 1 orally once a day   aspirin 81 mg oral tablet: 1 orally once a day  enoxaparin: 40 milligram(s) subcutaneous once a day  finasteride 5 mg oral tablet: 1 tab(s) orally once a day  guaiFENesin 100 mg/5 mL oral liquid: 10 milliliter(s) orally every 6 hours as needed for  cough  latanoprost 0.005% ophthalmic solution: 1 in each eye once a day  levothyroxine 25 mcg (0.025 mg) oral tablet: 1 tab(s) orally once a day  metoprolol succinate 25 mg oral tablet, extended release: 1 tab(s) orally once a day  pantoprazole 40 mg oral delayed release tablet: 1 orally once a day  simvastatin 20 mg oral tablet: 1 tab(s) orally once a day (at bedtime)  tamsulosin 0.4 mg oral capsule: 1 cap(s) orally once a day  triamcinolone 0.1% topical cream: Apply topically to affected area 2 times a day  Vitamin C 500 mg oral capsule: 1 orally once a day

## 2023-03-31 NOTE — PROGRESS NOTE ADULT - SUBJECTIVE AND OBJECTIVE BOX
Patient is a 89y old  Male who presents with a chief complaint of Dysphagia (30 Mar 2023 11:53)    PATIENT IS SEEN AND EXAMINED IN MEDICAL FLOOR.  NGT [    ]    IVETT [   ]      GT [   ]    ALLERGIES:  No Known Allergies      Daily     Daily     VITALS:    Vital Signs Last 24 Hrs  T(C): 37.3 (31 Mar 2023 05:08), Max: 37.4 (30 Mar 2023 21:17)  T(F): 99.1 (31 Mar 2023 05:08), Max: 99.3 (30 Mar 2023 21:17)  HR: 82 (31 Mar 2023 05:36) (82 - 100)  BP: 127/62 (31 Mar 2023 05:36) (122/74 - 130/72)  BP(mean): --  RR: 18 (31 Mar 2023 05:08) (18 - 18)  SpO2: 99% (31 Mar 2023 05:08) (97% - 99%)    Parameters below as of 31 Mar 2023 05:08  Patient On (Oxygen Delivery Method): room air        LABS:    CBC Full  -  ( 31 Mar 2023 06:00 )  WBC Count : 10.09 K/uL  RBC Count : 3.82 M/uL  Hemoglobin : 10.3 g/dL  Hematocrit : 33.2 %  Platelet Count - Automated : 387 K/uL  Mean Cell Volume : 86.9 fl  Mean Cell Hemoglobin : 27.0 pg  Mean Cell Hemoglobin Concentration : 31.0 gm/dL  Auto Neutrophil # : 8.14 K/uL  Auto Lymphocyte # : 0.94 K/uL  Auto Monocyte # : 0.61 K/uL  Auto Eosinophil # : 0.29 K/uL  Auto Basophil # : 0.02 K/uL  Auto Neutrophil % : 80.7 %  Auto Lymphocyte % : 9.3 %  Auto Monocyte % : 6.0 %  Auto Eosinophil % : 2.9 %  Auto Basophil % : 0.2 %      03-31    140  |  111<H>  |  7   ----------------------------<  128<H>  3.2<L>   |  25  |  0.86    Ca    8.5      31 Mar 2023 06:00      CAPILLARY BLOOD GLUCOSE              Creatinine Trend: 0.86<--, 0.92<--, 1.09<--  I&O's Summary          .Urine  03-28 @ 14:55   No growth  --  --      .Blood  03-28 @ 13:45   No growth to date.  --  --          MEDICATIONS:    MEDICATIONS  (STANDING):  ascorbic acid 500 milliGRAM(s) Oral daily  aspirin  chewable 81 milliGRAM(s) Oral daily  dextrose 5% + sodium chloride 0.9%. 1000 milliLiter(s) (75 mL/Hr) IV Continuous <Continuous>  finasteride 5 milliGRAM(s) Oral daily  heparin   Injectable 5000 Unit(s) SubCutaneous every 12 hours  latanoprost 0.005% Ophthalmic Solution 1 Drop(s) Both EYES at bedtime  levothyroxine 25 MICROGram(s) Oral daily  metoprolol succinate ER 25 milliGRAM(s) Oral daily  pantoprazole    Tablet 40 milliGRAM(s) Oral before breakfast  potassium chloride  10 mEq/100 mL IVPB 10 milliEquivalent(s) IV Intermittent once  simvastatin 20 milliGRAM(s) Oral at bedtime  tamsulosin 0.4 milliGRAM(s) Oral at bedtime      MEDICATIONS  (PRN):      REVIEW OF SYSTEMS:                           ALL ROS DONE [ X   ]    CONSTITUTIONAL:  LETHARGIC [   ], FEVER [   ], UNRESPONSIVE [   ]  CVS:  CP  [   ], SOB, [   ], PALPITATIONS [   ], DIZZYNESS [   ]  RS: COUGH [   ], SPUTUM [   ]  GI: ABDOMINAL PAIN [   ], NAUSEA [   ], VOMITINGS [   ], DIARRHEA [   ], CONSTIPATION [   ]  :  DYSURIA [   ], NOCTURIA [   ], INCREASED FREQUENCY [   ], DRIBLING [   ],  SKELETAL: PAINFUL JOINTS [   ], SWOLLEN JOINTS [   ], NECK ACHE [   ], LOW BACK ACHE [   ],  SKIN : ULCERS [   ], RASH [   ], ITCHING [   ]  CNS: HEAD ACHE [   ], DOUBLE VISION [   ], BLURRED VISION [   ], AMS / CONFUSION [   ], SEIZURES [   ], WEAKNESS [   ],TINGLING / NUMBNESS [   ]      PHYSICAL EXAMINATION:  GENERAL APPEARANCE: NO DISTRESS  HEENT:  NO PALLOR, NO  JVD,  NO   NODES, NECK SUPPLE  CVS: S1 +, S2 +,   RS: AEEB,  OCCASIONAL  RALES +,   NO RONCHI  ABD: SOFT, NT, NO, BS +  EXT: NO PE  SKIN: WARM,   SKELETAL:  ROM ACCEPTABLE  CNS:  AAO X 1 ,   DEFICITS    RADIOLOGY :    RADIOLOGY AND READINGS REVIEWED      ASSESSMENT :     Urinary tract infection    No pertinent past medical history    Hypothyroidism    HTN (hypertension)    HLD (hyperlipidemia)    Glaucoma    GERD (gastroesophageal reflux disease)    BPH (benign prostatic hyperplasia)    Prostate cancer    No significant past surgical history        PLAN:  HPI:  87 year old male from [facility] with PMHx of hypertension, hyperlipidemia. glaucoma, GERD, BPH, hypothyroidism, and prostate cancer, baseline A&Ox1, presenting from NH after difficulty swallowing for x4 weeks. Pt. denies difficulty swallowing during exam, and denies any chest pain, palpitations, headache, urinary discomfort abdominal pain, fever, chills. Only oriented to self, thinks he is in "[place]", and said the year was "3000". Pt is not a good historian, was unable to get in contact with HCP Ene Serrano. Was admitted in the past for ESBL E.Coli bacteremia in 4/2020, stay complicated by COVID infection, most recent ED trip s/p mechanical fall 10/2021.     ED vitals:154/78 BP, 17 RR, 74 HR, 98.1 F  Labs: WC 19.83, BMP wnl, UA pos nitrites, leuk esterase, 6-10 WBC, 25-50 RBCs   BCx and UCx pending  Barium swallow Xray pending  (28 Mar 2023 19:14)    # SEPSIS S/T SUSPECTED UTI, HX OF ESBL ECOLI UTI  - SWITCHED CEFEPIME TO ROCEPHIN, F/U BCX AND UCX - NGTD  - ID CONSULT    # DYSPHAGIA  - NPO  - IVF  - ASPIRATION PRECAUTIONS  - ST EVAL - SOFT AND BITE SIZED AND THIN LIQUIDS  - F/U BARIUM ESOPHAGRAM - MILDLY DILATED PROXIMAL AND MIDESOPHAGUS WITH MILD NARROWING OF THE DISTAL ESOPHAGUS WITH MILD IRREGULARITY OF ESOPHAGEAL WALL. MILD DELAY IN PASSAGE OF BARIUM THROUGH THE ESOPHAGUS WITH TERTIARY CONTRACTIONS AND INTRA-ESOPHAGEAL REFLUX.  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      ALLERGIES:  No Known Allergies      VITALS:    Vital Signs Last 24 Hrs  T(C): 37.3 (31 Mar 2023 05:08), Max: 37.4 (30 Mar 2023 21:17)  T(F): 99.1 (31 Mar 2023 05:08), Max: 99.3 (30 Mar 2023 21:17)  HR: 82 (31 Mar 2023 05:36) (82 - 100)  BP: 127/62 (31 Mar 2023 05:36) (122/74 - 130/72)  BP(mean): --  RR: 18 (31 Mar 2023 05:08) (18 - 18)  SpO2: 99% (31 Mar 2023 05:08) (97% - 99%)    Parameters below as of 31 Mar 2023 05:08  Patient On (Oxygen Delivery Method): room air        LABS:    CBC Full  -  ( 31 Mar 2023 06:00 )  WBC Count : 10.09 K/uL  RBC Count : 3.82 M/uL  Hemoglobin : 10.3 g/dL  Hematocrit : 33.2 %  Platelet Count - Automated : 387 K/uL  Mean Cell Volume : 86.9 fl  Mean Cell Hemoglobin : 27.0 pg  Mean Cell Hemoglobin Concentration : 31.0 gm/dL  Auto Neutrophil # : 8.14 K/uL  Auto Lymphocyte # : 0.94 K/uL  Auto Monocyte # : 0.61 K/uL  Auto Eosinophil # : 0.29 K/uL  Auto Basophil # : 0.02 K/uL  Auto Neutrophil % : 80.7 %  Auto Lymphocyte % : 9.3 %  Auto Monocyte % : 6.0 %  Auto Eosinophil % : 2.9 %  Auto Basophil % : 0.2 %      03-31    140  |  111<H>  |  7   ----------------------------<  128<H>  3.2<L>   |  25  |  0.86    Ca    8.5      31 Mar 2023 06:00      CAPILLARY BLOOD GLUCOSE              Creatinine Trend: 0.86<--, 0.92<--, 1.09<--  I&O's Summary          .Urine  03-28 @ 14:55   No growth  --  --      .Blood  03-28 @ 13:45   No growth to date.  --  --          MEDICATIONS:    MEDICATIONS  (STANDING):  ascorbic acid 500 milliGRAM(s) Oral daily  aspirin  chewable 81 milliGRAM(s) Oral daily  dextrose 5% + sodium chloride 0.9%. 1000 milliLiter(s) (75 mL/Hr) IV Continuous <Continuous>  finasteride 5 milliGRAM(s) Oral daily  heparin   Injectable 5000 Unit(s) SubCutaneous every 12 hours  latanoprost 0.005% Ophthalmic Solution 1 Drop(s) Both EYES at bedtime  levothyroxine 25 MICROGram(s) Oral daily  metoprolol succinate ER 25 milliGRAM(s) Oral daily  pantoprazole    Tablet 40 milliGRAM(s) Oral before breakfast  potassium chloride  10 mEq/100 mL IVPB 10 milliEquivalent(s) IV Intermittent once  simvastatin 20 milliGRAM(s) Oral at bedtime  tamsulosin 0.4 milliGRAM(s) Oral at bedtime      MEDICATIONS  (PRN):      REVIEW OF SYSTEMS:                           ALL ROS DONE [ X   ]    CONSTITUTIONAL:  LETHARGIC [   ], FEVER [   ], UNRESPONSIVE [   ]  CVS:  CP  [   ], SOB, [   ], PALPITATIONS [   ], DIZZYNESS [   ]  RS: COUGH [   ], SPUTUM [   ]  GI: ABDOMINAL PAIN [   ], NAUSEA [   ], VOMITINGS [   ], DIARRHEA [   ], CONSTIPATION [   ]  :  DYSURIA [   ], NOCTURIA [   ], INCREASED FREQUENCY [   ], DRIBLING [   ],  SKELETAL: PAINFUL JOINTS [   ], SWOLLEN JOINTS [   ], NECK ACHE [   ], LOW BACK ACHE [   ],  SKIN : ULCERS [   ], RASH [   ], ITCHING [   ]  CNS: HEAD ACHE [   ], DOUBLE VISION [   ], BLURRED VISION [   ], AMS / CONFUSION [   ], SEIZURES [   ], WEAKNESS [   ],TINGLING / NUMBNESS [   ]      PHYSICAL EXAMINATION:  GENERAL APPEARANCE: NO DISTRESS  HEENT:  NO PALLOR, NO  JVD,  NO   NODES, NECK SUPPLE  CVS: S1 +, S2 +,   RS: AEEB,  OCCASIONAL  RALES +,   NO RONCHI  ABD: SOFT, NT, NO, BS +  EXT: NO PE  SKIN: WARM,   SKELETAL:  ROM ACCEPTABLE  CNS:  AAO X 1 ,   DEFICITS    RADIOLOGY :    RADIOLOGY AND READINGS REVIEWED      ASSESSMENT :     Urinary tract infection    No pertinent past medical history    Hypothyroidism    HTN (hypertension)    HLD (hyperlipidemia)    Glaucoma    GERD (gastroesophageal reflux disease)    BPH (benign prostatic hyperplasia)    Prostate cancer    No significant past surgical history        PLAN:  HPI:  87 year old male from [facility] with PMHx of hypertension, hyperlipidemia. glaucoma, GERD, BPH, hypothyroidism, and prostate cancer, baseline A&Ox1, presenting from NH after difficulty swallowing for x4 weeks. Pt. denies difficulty swallowing during exam, and denies any chest pain, palpitations, headache, urinary discomfort abdominal pain, fever, chills. Only oriented to self, thinks he is in "[facility]", and said the year was "3000". Pt is not a good historian, was unable to get in contact with HCP Maria Ines Serrano. Was admitted in the past for ESBL E.Coli bacteremia in 4/2020, stay complicated by COVID infection, most recent ED trip s/p mechanical fall 10/2021.     ED vitals:154/78 BP, 17 RR, 74 HR, 98.1 F  Labs: WC 19.83, BMP wnl, UA pos nitrites, leuk esterase, 6-10 WBC, 25-50 RBCs   BCx and UCx pending  Barium swallow Xray pending  (28 Mar 2023 19:14)    # ATTEMPTED TO CALL HCP MARIA INES SERRANO MULTIPLE TIMES, WILL CONTINUE TO TRY CONTACT    # SEPSIS S/T SUSPECTED UTI, HX OF ESBL ECOLI UTI  - S/P ABX [ CEFEPIME THEN ROCEPHIN], F/U BCX AND UCX - NGTD  - ID CONSULT    # DYSPHAGIA  - ON DIET PER ST EVAL  - IVF  - ASPIRATION PRECAUTIONS  - ST EVAL - SOFT AND BITE SIZED AND THIN LIQUIDS  - F/U BARIUM ESOPHAGRAM - MILDLY DILATED PROXIMAL AND MIDESOPHAGUS WITH MILD NARROWING OF THE DISTAL ESOPHAGUS WITH MILD IRREGULARITY OF ESOPHAGEAL WALL. MILD DELAY IN PASSAGE OF BARIUM THROUGH THE ESOPHAGUS WITH TERTIARY CONTRACTIONS AND INTRA-ESOPHAGEAL REFLUX.  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

## 2023-04-01 LAB
ANION GAP SERPL CALC-SCNC: 5 MMOL/L — SIGNIFICANT CHANGE UP (ref 5–17)
BUN SERPL-MCNC: 5 MG/DL — LOW (ref 7–18)
CALCIUM SERPL-MCNC: 8.7 MG/DL — SIGNIFICANT CHANGE UP (ref 8.4–10.5)
CHLORIDE SERPL-SCNC: 108 MMOL/L — SIGNIFICANT CHANGE UP (ref 96–108)
CO2 SERPL-SCNC: 25 MMOL/L — SIGNIFICANT CHANGE UP (ref 22–31)
CREAT SERPL-MCNC: 0.76 MG/DL — SIGNIFICANT CHANGE UP (ref 0.5–1.3)
EGFR: 86 ML/MIN/1.73M2 — SIGNIFICANT CHANGE UP
GLUCOSE SERPL-MCNC: 119 MG/DL — HIGH (ref 70–99)
HCT VFR BLD CALC: 33.7 % — LOW (ref 39–50)
HGB BLD-MCNC: 10.3 G/DL — LOW (ref 13–17)
MCHC RBC-ENTMCNC: 26.5 PG — LOW (ref 27–34)
MCHC RBC-ENTMCNC: 30.6 GM/DL — LOW (ref 32–36)
MCV RBC AUTO: 86.6 FL — SIGNIFICANT CHANGE UP (ref 80–100)
NRBC # BLD: 0 /100 WBCS — SIGNIFICANT CHANGE UP (ref 0–0)
PLATELET # BLD AUTO: 389 K/UL — SIGNIFICANT CHANGE UP (ref 150–400)
POTASSIUM SERPL-MCNC: 3.1 MMOL/L — LOW (ref 3.5–5.3)
POTASSIUM SERPL-SCNC: 3.1 MMOL/L — LOW (ref 3.5–5.3)
RBC # BLD: 3.89 M/UL — LOW (ref 4.2–5.8)
RBC # FLD: 15.9 % — HIGH (ref 10.3–14.5)
SODIUM SERPL-SCNC: 138 MMOL/L — SIGNIFICANT CHANGE UP (ref 135–145)
WBC # BLD: 9.18 K/UL — SIGNIFICANT CHANGE UP (ref 3.8–10.5)
WBC # FLD AUTO: 9.18 K/UL — SIGNIFICANT CHANGE UP (ref 3.8–10.5)

## 2023-04-01 RX ORDER — POTASSIUM CHLORIDE 20 MEQ
40 PACKET (EA) ORAL ONCE
Refills: 0 | Status: COMPLETED | OUTPATIENT
Start: 2023-04-01 | End: 2023-04-01

## 2023-04-01 RX ORDER — POTASSIUM CHLORIDE 20 MEQ
10 PACKET (EA) ORAL
Refills: 0 | Status: COMPLETED | OUTPATIENT
Start: 2023-04-01 | End: 2023-04-01

## 2023-04-01 RX ADMIN — Medication 81 MILLIGRAM(S): at 11:31

## 2023-04-01 RX ADMIN — Medication 200 MILLIGRAM(S): at 11:31

## 2023-04-01 RX ADMIN — HEPARIN SODIUM 5000 UNIT(S): 5000 INJECTION INTRAVENOUS; SUBCUTANEOUS at 17:17

## 2023-04-01 RX ADMIN — Medication 500 MILLIGRAM(S): at 11:31

## 2023-04-01 RX ADMIN — LATANOPROST 1 DROP(S): 0.05 SOLUTION/ DROPS OPHTHALMIC; TOPICAL at 22:29

## 2023-04-01 RX ADMIN — Medication 100 MILLIEQUIVALENT(S): at 12:11

## 2023-04-01 RX ADMIN — HEPARIN SODIUM 5000 UNIT(S): 5000 INJECTION INTRAVENOUS; SUBCUTANEOUS at 06:39

## 2023-04-01 RX ADMIN — Medication 25 MICROGRAM(S): at 06:39

## 2023-04-01 RX ADMIN — TAMSULOSIN HYDROCHLORIDE 0.4 MILLIGRAM(S): 0.4 CAPSULE ORAL at 22:29

## 2023-04-01 RX ADMIN — Medication 25 MILLIGRAM(S): at 06:40

## 2023-04-01 RX ADMIN — Medication 100 MILLIEQUIVALENT(S): at 13:23

## 2023-04-01 RX ADMIN — Medication 40 MILLIEQUIVALENT(S): at 11:36

## 2023-04-01 RX ADMIN — Medication 200 MILLIGRAM(S): at 17:17

## 2023-04-01 RX ADMIN — SIMVASTATIN 20 MILLIGRAM(S): 20 TABLET, FILM COATED ORAL at 22:29

## 2023-04-01 RX ADMIN — Medication 100 MILLIEQUIVALENT(S): at 15:07

## 2023-04-01 RX ADMIN — SODIUM CHLORIDE 75 MILLILITER(S): 9 INJECTION, SOLUTION INTRAVENOUS at 05:17

## 2023-04-01 RX ADMIN — PANTOPRAZOLE SODIUM 40 MILLIGRAM(S): 20 TABLET, DELAYED RELEASE ORAL at 06:39

## 2023-04-01 RX ADMIN — FINASTERIDE 5 MILLIGRAM(S): 5 TABLET, FILM COATED ORAL at 11:31

## 2023-04-01 NOTE — PROGRESS NOTE ADULT - SUBJECTIVE AND OBJECTIVE BOX
Patient is a 89y old  Male who presents with a chief complaint of UTI, dysphagia (31 Mar 2023 15:35)    PATIENT IS SEEN AND EXAMINED IN MEDICAL FLOOR.  NGT [    ]    ROOT [   ]      GT [   ]    ALLERGIES:  No Known Allergies      Daily     Daily     VITALS:    Vital Signs Last 24 Hrs  T(C): 37.1 (01 Apr 2023 05:15), Max: 37.7 (31 Mar 2023 21:10)  T(F): 98.8 (01 Apr 2023 05:15), Max: 99.9 (31 Mar 2023 21:10)  HR: 76 (01 Apr 2023 05:15) (76 - 89)  BP: 119/74 (01 Apr 2023 05:15) (119/74 - 128/56)  BP(mean): --  RR: 18 (01 Apr 2023 05:15) (18 - 18)  SpO2: 97% (01 Apr 2023 05:15) (96% - 97%)    Parameters below as of 01 Apr 2023 05:15  Patient On (Oxygen Delivery Method): room air        LABS:    CBC Full  -  ( 01 Apr 2023 06:50 )  WBC Count : 9.18 K/uL  RBC Count : 3.89 M/uL  Hemoglobin : 10.3 g/dL  Hematocrit : 33.7 %  Platelet Count - Automated : 389 K/uL  Mean Cell Volume : 86.6 fl  Mean Cell Hemoglobin : 26.5 pg  Mean Cell Hemoglobin Concentration : 30.6 gm/dL  Auto Neutrophil # : x  Auto Lymphocyte # : x  Auto Monocyte # : x  Auto Eosinophil # : x  Auto Basophil # : x  Auto Neutrophil % : x  Auto Lymphocyte % : x  Auto Monocyte % : x  Auto Eosinophil % : x  Auto Basophil % : x      04-01    138  |  108  |  5<L>  ----------------------------<  119<H>  3.1<L>   |  25  |  0.76    Ca    8.7      01 Apr 2023 06:50      CAPILLARY BLOOD GLUCOSE              Creatinine Trend: 0.76<--, 0.86<--, 0.92<--, 1.09<--  I&O's Summary          .Urine  03-28 @ 14:55   No growth  --  --      .Blood  03-28 @ 13:45   No growth to date.  --  --          MEDICATIONS:    MEDICATIONS  (STANDING):  ascorbic acid 500 milliGRAM(s) Oral daily  aspirin  chewable 81 milliGRAM(s) Oral daily  dextrose 5% + sodium chloride 0.9%. 1000 milliLiter(s) (75 mL/Hr) IV Continuous <Continuous>  finasteride 5 milliGRAM(s) Oral daily  guaiFENesin Oral Liquid (Sugar-Free) 200 milliGRAM(s) Oral every 6 hours  heparin   Injectable 5000 Unit(s) SubCutaneous every 12 hours  latanoprost 0.005% Ophthalmic Solution 1 Drop(s) Both EYES at bedtime  levothyroxine 25 MICROGram(s) Oral daily  metoprolol succinate ER 25 milliGRAM(s) Oral daily  pantoprazole    Tablet 40 milliGRAM(s) Oral before breakfast  potassium chloride  10 mEq/100 mL IVPB 10 milliEquivalent(s) IV Intermittent every 1 hour  simvastatin 20 milliGRAM(s) Oral at bedtime  tamsulosin 0.4 milliGRAM(s) Oral at bedtime      MEDICATIONS  (PRN):      REVIEW OF SYSTEMS:                           ALL ROS DONE [ X   ]    CONSTITUTIONAL:  LETHARGIC [   ], FEVER [   ], UNRESPONSIVE [   ]  CVS:  CP  [   ], SOB, [   ], PALPITATIONS [   ], DIZZYNESS [   ]  RS: COUGH [   ], SPUTUM [   ]  GI: ABDOMINAL PAIN [   ], NAUSEA [   ], VOMITINGS [   ], DIARRHEA [   ], CONSTIPATION [   ]  :  DYSURIA [   ], NOCTURIA [   ], INCREASED FREQUENCY [   ], DRIBLING [   ],  SKELETAL: PAINFUL JOINTS [   ], SWOLLEN JOINTS [   ], NECK ACHE [   ], LOW BACK ACHE [   ],  SKIN : ULCERS [   ], RASH [   ], ITCHING [   ]  CNS: HEAD ACHE [   ], DOUBLE VISION [   ], BLURRED VISION [   ], AMS / CONFUSION [   ], SEIZURES [   ], WEAKNESS [   ],TINGLING / NUMBNESS [   ]    PHYSICAL EXAMINATION:  GENERAL APPEARANCE: NO DISTRESS  HEENT:  NO PALLOR, NO  JVD,  NO   NODES, NECK SUPPLE  CVS: S1 +, S2 +,   RS: AEEB,  OCCASIONAL  RALES +,   NO RONCHI  ABD: SOFT, NT, NO, BS +  EXT: NO PE  SKIN: WARM,   SKELETAL:  ROM ACCEPTABLE  CNS:  AAO X 1 ,   DEFICITS    RADIOLOGY :    RADIOLOGY AND READINGS REVIEWED      ASSESSMENT :     Urinary tract infection    No pertinent past medical history    Hypothyroidism    HTN (hypertension)    HLD (hyperlipidemia)    Glaucoma    GERD (gastroesophageal reflux disease)    BPH (benign prostatic hyperplasia)    Prostate cancer    No significant past surgical history        PLAN:  HPI:  87 year old male from [facility] with PMHx of hypertension, hyperlipidemia. glaucoma, GERD, BPH, hypothyroidism, and prostate cancer, baseline A&Ox1, presenting from NH after difficulty swallowing for x4 weeks. Pt. denies difficulty swallowing during exam, and denies any chest pain, palpitations, headache, urinary discomfort abdominal pain, fever, chills. Only oriented to self, thinks he is in "[facility]", and said the year was "3000". Pt is not a good historian, was unable to get in contact with HCP Maria Ines Serrano. Was admitted in the past for ESBL E.Coli bacteremia in 4/2020, stay complicated by COVID infection, most recent ED trip s/p mechanical fall 10/2021.     ED vitals:154/78 BP, 17 RR, 74 HR, 98.1 F  Labs: WC 19.83, BMP wnl, UA pos nitrites, leuk esterase, 6-10 WBC, 25-50 RBCs   BCx and UCx pending  Barium swallow Xray pending  (28 Mar 2023 19:14)    # ATTEMPTED TO CALL HCP MARIA INES SERRANO MULTIPLE TIMES, WILL CONTINUE TO TRY CONTACT    # SEPSIS S/T SUSPECTED UTI, HX OF ESBL ECOLI UTI  - S/P ABX [ CEFEPIME THEN ROCEPHIN], F/U BCX AND UCX - NGTD  - ID CONSULT    # DYSPHAGIA  - ON DIET PER ST EVAL  - IVF  - ASPIRATION PRECAUTIONS  - ST EVAL - SOFT AND BITE SIZED AND THIN LIQUIDS  - F/U BARIUM ESOPHAGRAM - MILDLY DILATED PROXIMAL AND MIDESOPHAGUS WITH MILD NARROWING OF THE DISTAL ESOPHAGUS WITH MILD IRREGULARITY OF ESOPHAGEAL WALL. MILD DELAY IN PASSAGE OF BARIUM THROUGH THE ESOPHAGUS WITH TERTIARY CONTRACTIONS AND INTRA-ESOPHAGEAL REFLUX.  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    ALLERGIES:  No Known Allergies    VITALS:    Vital Signs Last 24 Hrs  T(C): 37.1 (01 Apr 2023 05:15), Max: 37.7 (31 Mar 2023 21:10)  T(F): 98.8 (01 Apr 2023 05:15), Max: 99.9 (31 Mar 2023 21:10)  HR: 76 (01 Apr 2023 05:15) (76 - 89)  BP: 119/74 (01 Apr 2023 05:15) (119/74 - 128/56)  BP(mean): --  RR: 18 (01 Apr 2023 05:15) (18 - 18)  SpO2: 97% (01 Apr 2023 05:15) (96% - 97%)    Parameters below as of 01 Apr 2023 05:15  Patient On (Oxygen Delivery Method): room air        LABS:    CBC Full  -  ( 01 Apr 2023 06:50 )  WBC Count : 9.18 K/uL  RBC Count : 3.89 M/uL  Hemoglobin : 10.3 g/dL  Hematocrit : 33.7 %  Platelet Count - Automated : 389 K/uL  Mean Cell Volume : 86.6 fl  Mean Cell Hemoglobin : 26.5 pg  Mean Cell Hemoglobin Concentration : 30.6 gm/dL  Auto Neutrophil # : x  Auto Lymphocyte # : x  Auto Monocyte # : x  Auto Eosinophil # : x  Auto Basophil # : x  Auto Neutrophil % : x  Auto Lymphocyte % : x  Auto Monocyte % : x  Auto Eosinophil % : x  Auto Basophil % : x      04-01    138  |  108  |  5<L>  ----------------------------<  119<H>  3.1<L>   |  25  |  0.76    Ca    8.7      01 Apr 2023 06:50      CAPILLARY BLOOD GLUCOSE      Creatinine Trend: 0.76<--, 0.86<--, 0.92<--, 1.09<--  I&O's Summary      .Urine  03-28 @ 14:55   No growth  --  --      .Blood  03-28 @ 13:45   No growth to date.  --  --      MEDICATIONS:    MEDICATIONS  (STANDING):  ascorbic acid 500 milliGRAM(s) Oral daily  aspirin  chewable 81 milliGRAM(s) Oral daily  dextrose 5% + sodium chloride 0.9%. 1000 milliLiter(s) (75 mL/Hr) IV Continuous <Continuous>  finasteride 5 milliGRAM(s) Oral daily  guaiFENesin Oral Liquid (Sugar-Free) 200 milliGRAM(s) Oral every 6 hours  heparin   Injectable 5000 Unit(s) SubCutaneous every 12 hours  latanoprost 0.005% Ophthalmic Solution 1 Drop(s) Both EYES at bedtime  levothyroxine 25 MICROGram(s) Oral daily  metoprolol succinate ER 25 milliGRAM(s) Oral daily  pantoprazole    Tablet 40 milliGRAM(s) Oral before breakfast  potassium chloride  10 mEq/100 mL IVPB 10 milliEquivalent(s) IV Intermittent every 1 hour  simvastatin 20 milliGRAM(s) Oral at bedtime  tamsulosin 0.4 milliGRAM(s) Oral at bedtime      MEDICATIONS  (PRN):      REVIEW OF SYSTEMS:                           ALL ROS DONE [ X   ]    CONSTITUTIONAL:  LETHARGIC [   ], FEVER [   ], UNRESPONSIVE [   ]  CVS:  CP  [   ], SOB, [   ], PALPITATIONS [   ], DIZZYNESS [   ]  RS: COUGH [   ], SPUTUM [   ]  GI: ABDOMINAL PAIN [   ], NAUSEA [   ], VOMITINGS [   ], DIARRHEA [   ], CONSTIPATION [   ]  :  DYSURIA [   ], NOCTURIA [   ], INCREASED FREQUENCY [   ], DRIBLING [   ],  SKELETAL: PAINFUL JOINTS [   ], SWOLLEN JOINTS [   ], NECK ACHE [   ], LOW BACK ACHE [   ],  SKIN : ULCERS [   ], RASH [   ], ITCHING [   ]  CNS: HEAD ACHE [   ], DOUBLE VISION [   ], BLURRED VISION [   ], AMS / CONFUSION [   ], SEIZURES [   ], WEAKNESS [   ],TINGLING / NUMBNESS [   ]    PHYSICAL EXAMINATION:  GENERAL APPEARANCE: NO DISTRESS  HEENT:  NO PALLOR, NO  JVD,  NO   NODES, NECK SUPPLE  CVS: S1 +, S2 +,   RS: AEEB,  OCCASIONAL  RALES +,   NO RONCHI  ABD: SOFT, NT, NO, BS +  EXT: NO PE  SKIN: WARM,   SKELETAL:  ROM ACCEPTABLE  CNS:  AAO X 1 ,   DEFICITS    RADIOLOGY :    RADIOLOGY AND READINGS REVIEWED      ASSESSMENT :     Urinary tract infection    No pertinent past medical history    Hypothyroidism    HTN (hypertension)    HLD (hyperlipidemia)    Glaucoma    GERD (gastroesophageal reflux disease)    BPH (benign prostatic hyperplasia)    Prostate cancer    No significant past surgical history        PLAN:  HPI:  87 year old male from [facility] with PMHx of hypertension, hyperlipidemia. glaucoma, GERD, BPH, hypothyroidism, and prostate cancer, baseline A&Ox1, presenting from NH after difficulty swallowing for x4 weeks. Pt. denies difficulty swallowing during exam, and denies any chest pain, palpitations, headache, urinary discomfort abdominal pain, fever, chills. Only oriented to self, thinks he is in "[facility]", and said the year was "3000". Pt is not a good historian, was unable to get in contact with HCP Maria Ines Serrano. Was admitted in the past for ESBL E.Coli bacteremia in 4/2020, stay complicated by COVID infection, most recent ED trip s/p mechanical fall 10/2021.     ED vitals:154/78 BP, 17 RR, 74 HR, 98.1 F  Labs: WC 19.83, BMP wnl, UA pos nitrites, leuk esterase, 6-10 WBC, 25-50 RBCs   BCx and UCx pending  Barium swallow Xray pending  (28 Mar 2023 19:14)    # ATTEMPTED TO CALL HCP MARIA INES SERRANO MULTIPLE TIMES, WILL CONTINUE TO TRY CONTACT    # SEPSIS S/T SUSPECTED UTI, HX OF ESBL ECOLI UTI  - S/P ABX [ CEFEPIME THEN ROCEPHIN], F/U BCX AND UCX - NGTD  - ID CONSULT    # DYSPHAGIA  - ON DIET PER ST EVAL  - IVF  - ASPIRATION PRECAUTIONS  - ST EVAL - SOFT AND BITE SIZED AND THIN LIQUIDS  - F/U BARIUM ESOPHAGRAM - MILDLY DILATED PROXIMAL AND MIDESOPHAGUS WITH MILD NARROWING OF THE DISTAL ESOPHAGUS WITH MILD IRREGULARITY OF ESOPHAGEAL WALL. MILD DELAY IN PASSAGE OF BARIUM THROUGH THE ESOPHAGUS WITH TERTIARY CONTRACTIONS AND INTRA-ESOPHAGEAL REFLUX.  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

## 2023-04-02 LAB
ANION GAP SERPL CALC-SCNC: 5 MMOL/L — SIGNIFICANT CHANGE UP (ref 5–17)
BUN SERPL-MCNC: 5 MG/DL — LOW (ref 7–18)
CALCIUM SERPL-MCNC: 8.7 MG/DL — SIGNIFICANT CHANGE UP (ref 8.4–10.5)
CHLORIDE SERPL-SCNC: 109 MMOL/L — HIGH (ref 96–108)
CO2 SERPL-SCNC: 27 MMOL/L — SIGNIFICANT CHANGE UP (ref 22–31)
CREAT SERPL-MCNC: 0.79 MG/DL — SIGNIFICANT CHANGE UP (ref 0.5–1.3)
EGFR: 85 ML/MIN/1.73M2 — SIGNIFICANT CHANGE UP
GLUCOSE SERPL-MCNC: 122 MG/DL — HIGH (ref 70–99)
HCT VFR BLD CALC: 34.3 % — LOW (ref 39–50)
HGB BLD-MCNC: 10.8 G/DL — LOW (ref 13–17)
MAGNESIUM SERPL-MCNC: 2.1 MG/DL — SIGNIFICANT CHANGE UP (ref 1.6–2.6)
MCHC RBC-ENTMCNC: 27.1 PG — SIGNIFICANT CHANGE UP (ref 27–34)
MCHC RBC-ENTMCNC: 31.5 GM/DL — LOW (ref 32–36)
MCV RBC AUTO: 86.2 FL — SIGNIFICANT CHANGE UP (ref 80–100)
NRBC # BLD: 0 /100 WBCS — SIGNIFICANT CHANGE UP (ref 0–0)
PHOSPHATE SERPL-MCNC: 2.6 MG/DL — SIGNIFICANT CHANGE UP (ref 2.5–4.5)
PLATELET # BLD AUTO: 392 K/UL — SIGNIFICANT CHANGE UP (ref 150–400)
POTASSIUM SERPL-MCNC: 3.4 MMOL/L — LOW (ref 3.5–5.3)
POTASSIUM SERPL-SCNC: 3.4 MMOL/L — LOW (ref 3.5–5.3)
RBC # BLD: 3.98 M/UL — LOW (ref 4.2–5.8)
RBC # FLD: 15.7 % — HIGH (ref 10.3–14.5)
SODIUM SERPL-SCNC: 141 MMOL/L — SIGNIFICANT CHANGE UP (ref 135–145)
WBC # BLD: 8.84 K/UL — SIGNIFICANT CHANGE UP (ref 3.8–10.5)
WBC # FLD AUTO: 8.84 K/UL — SIGNIFICANT CHANGE UP (ref 3.8–10.5)

## 2023-04-02 RX ORDER — POTASSIUM CHLORIDE 20 MEQ
40 PACKET (EA) ORAL ONCE
Refills: 0 | Status: COMPLETED | OUTPATIENT
Start: 2023-04-02 | End: 2023-04-02

## 2023-04-02 RX ADMIN — FINASTERIDE 5 MILLIGRAM(S): 5 TABLET, FILM COATED ORAL at 11:39

## 2023-04-02 RX ADMIN — Medication 200 MILLIGRAM(S): at 07:03

## 2023-04-02 RX ADMIN — PANTOPRAZOLE SODIUM 40 MILLIGRAM(S): 20 TABLET, DELAYED RELEASE ORAL at 07:04

## 2023-04-02 RX ADMIN — Medication 200 MILLIGRAM(S): at 01:36

## 2023-04-02 RX ADMIN — SIMVASTATIN 20 MILLIGRAM(S): 20 TABLET, FILM COATED ORAL at 21:12

## 2023-04-02 RX ADMIN — HEPARIN SODIUM 5000 UNIT(S): 5000 INJECTION INTRAVENOUS; SUBCUTANEOUS at 07:04

## 2023-04-02 RX ADMIN — SODIUM CHLORIDE 75 MILLILITER(S): 9 INJECTION, SOLUTION INTRAVENOUS at 21:10

## 2023-04-02 RX ADMIN — Medication 500 MILLIGRAM(S): at 11:39

## 2023-04-02 RX ADMIN — LATANOPROST 1 DROP(S): 0.05 SOLUTION/ DROPS OPHTHALMIC; TOPICAL at 21:11

## 2023-04-02 RX ADMIN — TAMSULOSIN HYDROCHLORIDE 0.4 MILLIGRAM(S): 0.4 CAPSULE ORAL at 21:12

## 2023-04-02 RX ADMIN — HEPARIN SODIUM 5000 UNIT(S): 5000 INJECTION INTRAVENOUS; SUBCUTANEOUS at 17:52

## 2023-04-02 RX ADMIN — Medication 25 MICROGRAM(S): at 07:03

## 2023-04-02 RX ADMIN — Medication 81 MILLIGRAM(S): at 11:39

## 2023-04-02 RX ADMIN — Medication 200 MILLIGRAM(S): at 17:52

## 2023-04-02 RX ADMIN — Medication 200 MILLIGRAM(S): at 11:39

## 2023-04-02 RX ADMIN — SODIUM CHLORIDE 75 MILLILITER(S): 9 INJECTION, SOLUTION INTRAVENOUS at 07:05

## 2023-04-02 RX ADMIN — Medication 200 MILLIGRAM(S): at 23:23

## 2023-04-02 RX ADMIN — Medication 40 MILLIEQUIVALENT(S): at 11:39

## 2023-04-02 NOTE — PROGRESS NOTE ADULT - SUBJECTIVE AND OBJECTIVE BOX
Patient is a 89y old  Male who presents with a chief complaint of UTI, dysphagia (31 Mar 2023 15:35)    PATIENT IS SEEN AND EXAMINED IN MEDICAL FLOOR.  NGT [    ]    OROT [   ]      GT [   ]    ALLERGIES:  No Known Allergies      Daily     Daily     VITALS:    Vital Signs Last 24 Hrs  T(C): 36.6 (02 Apr 2023 05:06), Max: 36.9 (01 Apr 2023 14:30)  T(F): 97.8 (02 Apr 2023 05:06), Max: 98.5 (01 Apr 2023 14:30)  HR: 74 (02 Apr 2023 05:06) (74 - 92)  BP: 100/60 (02 Apr 2023 05:06) (100/60 - 135/83)  BP(mean): --  RR: 18 (02 Apr 2023 05:06) (18 - 18)  SpO2: 96% (02 Apr 2023 05:06) (95% - 99%)    Parameters below as of 02 Apr 2023 05:06  Patient On (Oxygen Delivery Method): room air        LABS:    CBC Full  -  ( 02 Apr 2023 06:35 )  WBC Count : 8.84 K/uL  RBC Count : 3.98 M/uL  Hemoglobin : 10.8 g/dL  Hematocrit : 34.3 %  Platelet Count - Automated : 392 K/uL  Mean Cell Volume : 86.2 fl  Mean Cell Hemoglobin : 27.1 pg  Mean Cell Hemoglobin Concentration : 31.5 gm/dL  Auto Neutrophil # : x  Auto Lymphocyte # : x  Auto Monocyte # : x  Auto Eosinophil # : x  Auto Basophil # : x  Auto Neutrophil % : x  Auto Lymphocyte % : x  Auto Monocyte % : x  Auto Eosinophil % : x  Auto Basophil % : x      04-02    141  |  109<H>  |  5<L>  ----------------------------<  122<H>  3.4<L>   |  27  |  0.79    Ca    8.7      02 Apr 2023 06:35  Phos  2.6     04-02  Mg     2.1     04-02      CAPILLARY BLOOD GLUCOSE              Creatinine Trend: 0.79<--, 0.76<--, 0.86<--, 0.92<--, 1.09<--  I&O's Summary          .Urine  03-28 @ 14:55   No growth  --  --      .Blood  03-28 @ 13:45   No growth to date.  --  --          MEDICATIONS:    MEDICATIONS  (STANDING):  ascorbic acid 500 milliGRAM(s) Oral daily  aspirin  chewable 81 milliGRAM(s) Oral daily  dextrose 5% + sodium chloride 0.9%. 1000 milliLiter(s) (75 mL/Hr) IV Continuous <Continuous>  finasteride 5 milliGRAM(s) Oral daily  guaiFENesin Oral Liquid (Sugar-Free) 200 milliGRAM(s) Oral every 6 hours  heparin   Injectable 5000 Unit(s) SubCutaneous every 12 hours  latanoprost 0.005% Ophthalmic Solution 1 Drop(s) Both EYES at bedtime  levothyroxine 25 MICROGram(s) Oral daily  metoprolol succinate ER 25 milliGRAM(s) Oral daily  pantoprazole    Tablet 40 milliGRAM(s) Oral before breakfast  potassium chloride   Powder 40 milliEquivalent(s) Oral once  simvastatin 20 milliGRAM(s) Oral at bedtime  tamsulosin 0.4 milliGRAM(s) Oral at bedtime      MEDICATIONS  (PRN):      REVIEW OF SYSTEMS:                           ALL ROS DONE [ X   ]    CONSTITUTIONAL:  LETHARGIC [   ], FEVER [   ], UNRESPONSIVE [   ]  CVS:  CP  [   ], SOB, [   ], PALPITATIONS [   ], DIZZYNESS [   ]  RS: COUGH [   ], SPUTUM [   ]  GI: ABDOMINAL PAIN [   ], NAUSEA [   ], VOMITINGS [   ], DIARRHEA [   ], CONSTIPATION [   ]  :  DYSURIA [   ], NOCTURIA [   ], INCREASED FREQUENCY [   ], DRIBLING [   ],  SKELETAL: PAINFUL JOINTS [   ], SWOLLEN JOINTS [   ], NECK ACHE [   ], LOW BACK ACHE [   ],  SKIN : ULCERS [   ], RASH [   ], ITCHING [   ]  CNS: HEAD ACHE [   ], DOUBLE VISION [   ], BLURRED VISION [   ], AMS / CONFUSION [   ], SEIZURES [   ], WEAKNESS [   ],TINGLING / NUMBNESS [   ]    PHYSICAL EXAMINATION:  GENERAL APPEARANCE: NO DISTRESS  HEENT:  NO PALLOR, NO  JVD,  NO   NODES, NECK SUPPLE  CVS: S1 +, S2 +,   RS: AEEB,  OCCASIONAL  RALES +,   NO RONCHI  ABD: SOFT, NT, NO, BS +  EXT: NO PE  SKIN: WARM,   SKELETAL:  ROM ACCEPTABLE  CNS:  AAO X 1 ,   DEFICITS    RADIOLOGY :    RADIOLOGY AND READINGS REVIEWED      ASSESSMENT :     Urinary tract infection    No pertinent past medical history    Hypothyroidism    HTN (hypertension)    HLD (hyperlipidemia)    Glaucoma    GERD (gastroesophageal reflux disease)    BPH (benign prostatic hyperplasia)    Prostate cancer    No significant past surgical history        PLAN:  HPI:  87 year old male from [facility] with PMHx of hypertension, hyperlipidemia. glaucoma, GERD, BPH, hypothyroidism, and prostate cancer, baseline A&Ox1, presenting from NH after difficulty swallowing for x4 weeks. Pt. denies difficulty swallowing during exam, and denies any chest pain, palpitations, headache, urinary discomfort abdominal pain, fever, chills. Only oriented to self, thinks he is in "[facility]", and said the year was "3000". Pt is not a good historian, was unable to get in contact with HCP Maria Ines Serrano. Was admitted in the past for ESBL E.Coli bacteremia in 4/2020, stay complicated by COVID infection, most recent ED trip s/p mechanical fall 10/2021.     ED vitals:154/78 BP, 17 RR, 74 HR, 98.1 F  Labs: WC 19.83, BMP wnl, UA pos nitrites, leuk esterase, 6-10 WBC, 25-50 RBCs   BCx and UCx pending  Barium swallow Xray pending  (28 Mar 2023 19:14)    # ATTEMPTED TO CALL HCP MARIA INES SERRANO MULTIPLE TIMES, WILL CONTINUE TO TRY CONTACT    # SEPSIS S/T SUSPECTED UTI, HX OF ESBL ECOLI UTI  - S/P ABX [ CEFEPIME THEN ROCEPHIN], F/U BCX AND UCX - NGTD  - ID CONSULT    # DYSPHAGIA  - ON DIET PER ST EVAL  - IVF  - ASPIRATION PRECAUTIONS  - ST EVAL - SOFT AND BITE SIZED AND THIN LIQUIDS  - F/U BARIUM ESOPHAGRAM - MILDLY DILATED PROXIMAL AND MIDESOPHAGUS WITH MILD NARROWING OF THE DISTAL ESOPHAGUS WITH MILD IRREGULARITY OF ESOPHAGEAL WALL. MILD DELAY IN PASSAGE OF BARIUM THROUGH THE ESOPHAGUS WITH TERTIARY CONTRACTIONS AND INTRA-ESOPHAGEAL REFLUX.  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    ALLERGIES:  No Known Allergies    VITALS:    Vital Signs Last 24 Hrs  T(C): 36.6 (02 Apr 2023 05:06), Max: 36.9 (01 Apr 2023 14:30)  T(F): 97.8 (02 Apr 2023 05:06), Max: 98.5 (01 Apr 2023 14:30)  HR: 74 (02 Apr 2023 05:06) (74 - 92)  BP: 100/60 (02 Apr 2023 05:06) (100/60 - 135/83)  BP(mean): --  RR: 18 (02 Apr 2023 05:06) (18 - 18)  SpO2: 96% (02 Apr 2023 05:06) (95% - 99%)    Parameters below as of 02 Apr 2023 05:06  Patient On (Oxygen Delivery Method): room air        LABS:    CBC Full  -  ( 02 Apr 2023 06:35 )  WBC Count : 8.84 K/uL  RBC Count : 3.98 M/uL  Hemoglobin : 10.8 g/dL  Hematocrit : 34.3 %  Platelet Count - Automated : 392 K/uL  Mean Cell Volume : 86.2 fl  Mean Cell Hemoglobin : 27.1 pg  Mean Cell Hemoglobin Concentration : 31.5 gm/dL  Auto Neutrophil # : x  Auto Lymphocyte # : x  Auto Monocyte # : x  Auto Eosinophil # : x  Auto Basophil # : x  Auto Neutrophil % : x  Auto Lymphocyte % : x  Auto Monocyte % : x  Auto Eosinophil % : x  Auto Basophil % : x      04-02    141  |  109<H>  |  5<L>  ----------------------------<  122<H>  3.4<L>   |  27  |  0.79    Ca    8.7      02 Apr 2023 06:35  Phos  2.6     04-02  Mg     2.1     04-02      CAPILLARY BLOOD GLUCOSE              Creatinine Trend: 0.79<--, 0.76<--, 0.86<--, 0.92<--, 1.09<--  I&O's Summary          .Urine  03-28 @ 14:55   No growth  --  --      .Blood  03-28 @ 13:45   No growth to date.  --  --          MEDICATIONS:    MEDICATIONS  (STANDING):  ascorbic acid 500 milliGRAM(s) Oral daily  aspirin  chewable 81 milliGRAM(s) Oral daily  dextrose 5% + sodium chloride 0.9%. 1000 milliLiter(s) (75 mL/Hr) IV Continuous <Continuous>  finasteride 5 milliGRAM(s) Oral daily  guaiFENesin Oral Liquid (Sugar-Free) 200 milliGRAM(s) Oral every 6 hours  heparin   Injectable 5000 Unit(s) SubCutaneous every 12 hours  latanoprost 0.005% Ophthalmic Solution 1 Drop(s) Both EYES at bedtime  levothyroxine 25 MICROGram(s) Oral daily  metoprolol succinate ER 25 milliGRAM(s) Oral daily  pantoprazole    Tablet 40 milliGRAM(s) Oral before breakfast  potassium chloride   Powder 40 milliEquivalent(s) Oral once  simvastatin 20 milliGRAM(s) Oral at bedtime  tamsulosin 0.4 milliGRAM(s) Oral at bedtime      MEDICATIONS  (PRN):      REVIEW OF SYSTEMS:                           ALL ROS DONE [ X   ]    CONSTITUTIONAL:  LETHARGIC [   ], FEVER [   ], UNRESPONSIVE [   ]  CVS:  CP  [   ], SOB, [   ], PALPITATIONS [   ], DIZZYNESS [   ]  RS: COUGH [   ], SPUTUM [   ]  GI: ABDOMINAL PAIN [   ], NAUSEA [   ], VOMITINGS [   ], DIARRHEA [   ], CONSTIPATION [   ]  :  DYSURIA [   ], NOCTURIA [   ], INCREASED FREQUENCY [   ], DRIBLING [   ],  SKELETAL: PAINFUL JOINTS [   ], SWOLLEN JOINTS [   ], NECK ACHE [   ], LOW BACK ACHE [   ],  SKIN : ULCERS [   ], RASH [   ], ITCHING [   ]  CNS: HEAD ACHE [   ], DOUBLE VISION [   ], BLURRED VISION [   ], AMS / CONFUSION [   ], SEIZURES [   ], WEAKNESS [   ],TINGLING / NUMBNESS [   ]    PHYSICAL EXAMINATION:  GENERAL APPEARANCE: NO DISTRESS  HEENT:  NO PALLOR, NO  JVD,  NO   NODES, NECK SUPPLE  CVS: S1 +, S2 +,   RS: AEEB,  OCCASIONAL  RALES +,   NO RONCHI  ABD: SOFT, NT, NO, BS +  EXT: NO PE  SKIN: WARM,   SKELETAL:  ROM ACCEPTABLE  CNS:  AAO X 1 ,   DEFICITS    RADIOLOGY :    RADIOLOGY AND READINGS REVIEWED      ASSESSMENT :     Urinary tract infection    No pertinent past medical history    Hypothyroidism    HTN (hypertension)    HLD (hyperlipidemia)    Glaucoma    GERD (gastroesophageal reflux disease)    BPH (benign prostatic hyperplasia)    Prostate cancer    No significant past surgical history        PLAN:  HPI:  87 year old male from [facility] with PMHx of hypertension, hyperlipidemia. glaucoma, GERD, BPH, hypothyroidism, and prostate cancer, baseline A&Ox1, presenting from NH after difficulty swallowing for x4 weeks. Pt. denies difficulty swallowing during exam, and denies any chest pain, palpitations, headache, urinary discomfort abdominal pain, fever, chills. Only oriented to self, thinks he is in "[facility]", and said the year was "3000". Pt is not a good historian, was unable to get in contact with HCP Maria Ines Serrano. Was admitted in the past for ESBL E.Coli bacteremia in 4/2020, stay complicated by COVID infection, most recent ED trip s/p mechanical fall 10/2021.     ED vitals:154/78 BP, 17 RR, 74 HR, 98.1 F  Labs: WC 19.83, BMP wnl, UA pos nitrites, leuk esterase, 6-10 WBC, 25-50 RBCs   BCx and UCx pending  Barium swallow Xray pending  (28 Mar 2023 19:14)    # ATTEMPTED TO CALL HCP MARIA INES SERRANO MULTIPLE TIMES, WILL CONTINUE TO TRY CONTACT    # SEPSIS S/T SUSPECTED UTI, HX OF ESBL ECOLI UTI  - S/P ABX [ CEFEPIME THEN ROCEPHIN], F/U BCX AND UCX - NGTD  - ID CONSULT    # DYSPHAGIA  - ON DIET PER ST EVAL  - IVF  - ASPIRATION PRECAUTIONS  - ST EVAL - SOFT AND BITE SIZED AND THIN LIQUIDS  - F/U BARIUM ESOPHAGRAM - MILDLY DILATED PROXIMAL AND MIDESOPHAGUS WITH MILD NARROWING OF THE DISTAL ESOPHAGUS WITH MILD IRREGULARITY OF ESOPHAGEAL WALL. MILD DELAY IN PASSAGE OF BARIUM THROUGH THE ESOPHAGUS WITH TERTIARY CONTRACTIONS AND INTRA-ESOPHAGEAL REFLUX.  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

## 2023-04-03 LAB
ANION GAP SERPL CALC-SCNC: 4 MMOL/L — LOW (ref 5–17)
BUN SERPL-MCNC: 4 MG/DL — LOW (ref 7–18)
CALCIUM SERPL-MCNC: 9 MG/DL — SIGNIFICANT CHANGE UP (ref 8.4–10.5)
CHLORIDE SERPL-SCNC: 108 MMOL/L — SIGNIFICANT CHANGE UP (ref 96–108)
CO2 SERPL-SCNC: 28 MMOL/L — SIGNIFICANT CHANGE UP (ref 22–31)
CREAT SERPL-MCNC: 0.75 MG/DL — SIGNIFICANT CHANGE UP (ref 0.5–1.3)
EGFR: 86 ML/MIN/1.73M2 — SIGNIFICANT CHANGE UP
GLUCOSE SERPL-MCNC: 123 MG/DL — HIGH (ref 70–99)
HCT VFR BLD CALC: 34.7 % — LOW (ref 39–50)
HGB BLD-MCNC: 10.7 G/DL — LOW (ref 13–17)
MAGNESIUM SERPL-MCNC: 2.1 MG/DL — SIGNIFICANT CHANGE UP (ref 1.6–2.6)
MCHC RBC-ENTMCNC: 26.9 PG — LOW (ref 27–34)
MCHC RBC-ENTMCNC: 30.8 GM/DL — LOW (ref 32–36)
MCV RBC AUTO: 87.2 FL — SIGNIFICANT CHANGE UP (ref 80–100)
NRBC # BLD: 0 /100 WBCS — SIGNIFICANT CHANGE UP (ref 0–0)
PHOSPHATE SERPL-MCNC: 2.7 MG/DL — SIGNIFICANT CHANGE UP (ref 2.5–4.5)
PLATELET # BLD AUTO: 399 K/UL — SIGNIFICANT CHANGE UP (ref 150–400)
POTASSIUM SERPL-MCNC: 3.5 MMOL/L — SIGNIFICANT CHANGE UP (ref 3.5–5.3)
POTASSIUM SERPL-SCNC: 3.5 MMOL/L — SIGNIFICANT CHANGE UP (ref 3.5–5.3)
RBC # BLD: 3.98 M/UL — LOW (ref 4.2–5.8)
RBC # FLD: 15.8 % — HIGH (ref 10.3–14.5)
SODIUM SERPL-SCNC: 140 MMOL/L — SIGNIFICANT CHANGE UP (ref 135–145)
WBC # BLD: 8.85 K/UL — SIGNIFICANT CHANGE UP (ref 3.8–10.5)
WBC # FLD AUTO: 8.85 K/UL — SIGNIFICANT CHANGE UP (ref 3.8–10.5)

## 2023-04-03 RX ADMIN — FINASTERIDE 5 MILLIGRAM(S): 5 TABLET, FILM COATED ORAL at 15:00

## 2023-04-03 RX ADMIN — TAMSULOSIN HYDROCHLORIDE 0.4 MILLIGRAM(S): 0.4 CAPSULE ORAL at 21:21

## 2023-04-03 RX ADMIN — HEPARIN SODIUM 5000 UNIT(S): 5000 INJECTION INTRAVENOUS; SUBCUTANEOUS at 05:35

## 2023-04-03 RX ADMIN — SIMVASTATIN 20 MILLIGRAM(S): 20 TABLET, FILM COATED ORAL at 21:21

## 2023-04-03 RX ADMIN — Medication 81 MILLIGRAM(S): at 15:01

## 2023-04-03 RX ADMIN — Medication 200 MILLIGRAM(S): at 05:35

## 2023-04-03 RX ADMIN — HEPARIN SODIUM 5000 UNIT(S): 5000 INJECTION INTRAVENOUS; SUBCUTANEOUS at 17:36

## 2023-04-03 RX ADMIN — Medication 200 MILLIGRAM(S): at 15:00

## 2023-04-03 RX ADMIN — PANTOPRAZOLE SODIUM 40 MILLIGRAM(S): 20 TABLET, DELAYED RELEASE ORAL at 05:36

## 2023-04-03 RX ADMIN — Medication 500 MILLIGRAM(S): at 15:00

## 2023-04-03 RX ADMIN — Medication 25 MILLIGRAM(S): at 05:35

## 2023-04-03 RX ADMIN — LATANOPROST 1 DROP(S): 0.05 SOLUTION/ DROPS OPHTHALMIC; TOPICAL at 21:20

## 2023-04-03 RX ADMIN — Medication 25 MICROGRAM(S): at 05:35

## 2023-04-03 NOTE — PROGRESS NOTE ADULT - SUBJECTIVE AND OBJECTIVE BOX
NP Note discussed with  Primary Attending    Patient is a 89y old  Male who presents with a chief complaint of UTI, dysphagia (31 Mar 2023 15:35)      INTERVAL HPI/OVERNIGHT EVENTS: no acute events overnight    MEDICATIONS  (STANDING):  ascorbic acid 500 milliGRAM(s) Oral daily  aspirin  chewable 81 milliGRAM(s) Oral daily  dextrose 5% + sodium chloride 0.9%. 1000 milliLiter(s) (75 mL/Hr) IV Continuous <Continuous>  finasteride 5 milliGRAM(s) Oral daily  guaiFENesin Oral Liquid (Sugar-Free) 200 milliGRAM(s) Oral every 6 hours  heparin   Injectable 5000 Unit(s) SubCutaneous every 12 hours  latanoprost 0.005% Ophthalmic Solution 1 Drop(s) Both EYES at bedtime  levothyroxine 25 MICROGram(s) Oral daily  metoprolol succinate ER 25 milliGRAM(s) Oral daily  pantoprazole    Tablet 40 milliGRAM(s) Oral before breakfast  simvastatin 20 milliGRAM(s) Oral at bedtime  tamsulosin 0.4 milliGRAM(s) Oral at bedtime    MEDICATIONS  (PRN):      __________________________________________________  REVIEW OF SYSTEMS:    CONSTITUTIONAL: No fever,   EYES: no acute visual disturbances  NECK: No pain or stiffness  RESPIRATORY: No cough; No shortness of breath  CARDIOVASCULAR: No chest pain, no palpitations  GASTROINTESTINAL: No pain. No nausea or vomiting; No diarrhea   NEUROLOGICAL: No headache or numbness, no tremors  MUSCULOSKELETAL: No joint pain, no muscle pain  GENITOURINARY: no dysuria, no frequency, no hesitancy  PSYCHIATRY: no depression , no anxiety  ALL OTHER  ROS negative        Vital Signs Last 24 Hrs  T(C): 36.7 (03 Apr 2023 05:12), Max: 37.1 (02 Apr 2023 13:54)  T(F): 98.1 (03 Apr 2023 05:12), Max: 98.8 (02 Apr 2023 13:54)  HR: 82 (03 Apr 2023 05:12) (82 - 89)  BP: 124/79 (03 Apr 2023 13:22) (120/77 - 132/74)  BP(mean): --  RR: 18 (03 Apr 2023 05:12) (18 - 18)  SpO2: 98% (03 Apr 2023 05:12) (93% - 100%)    Parameters below as of 03 Apr 2023 05:12  Patient On (Oxygen Delivery Method): room air        ________________________________________________  PHYSICAL EXAM:  GENERAL: NAD  HEENT: Normocephalic  CHEST/LUNG: Clear to auscultation bilaterally  HEART: S1 S2  regular  ABDOMEN: Soft, Nontender, Nondistended; Bowel sounds present  EXTREMITIES: no cyanosis; no edema  SKIN: warm and dry; no rash  NERVOUS SYSTEM:  Awake and alert; Oriented  to person    _________________________________________________  LABS:                        10.7   8.85  )-----------( 399      ( 03 Apr 2023 05:55 )             34.7     04-03    140  |  108  |  4<L>  ----------------------------<  123<H>  3.5   |  28  |  0.75    Ca    9.0      03 Apr 2023 05:55  Phos  2.7     04-03  Mg     2.1     04-03          CAPILLARY BLOOD GLUCOSE            RADIOLOGY & ADDITIONAL TESTS:    < from: Xray Esophagram Single Contrast (03.29.23 @ 14:12) >  FINDINGS:    Lungs are clear on the  view. Cardiomediastinal silhouette within   normal limits. No acute osseous abnormality.    The examination was technically limited secondary to difficulty with   patient positioning; patient was imagedwith the bed at approximately 45   degrees with the patient in the right posterior oblique position.    Mildly dilated proximal and midesophagus. Mild irregularity of the distal   esophageal wall with mild associated narrowing. There is a mild delay in   passage of contrast through the esophagus. Tertiary contractions and   intraesophageal reflux are noted.    There is a small to moderate hiatal hernia.    IMPRESSION:    Mildly dilated proximal and midesophagus with mild narrowing of the   distalesophagus with mild irregularity of esophageal wall; an endoscopy   is recommended for further evaluation if feasible.    Mild delay in passage of barium through the esophagus with tertiary   contractions and intraesophageal reflux.    Small to moderate hiatal hernia.    --- End of Report ---    < end of copied text >    < from: Xray Chest 1 View- PORTABLE-Urgent (03.28.23 @ 18:53) >  INDINGS:  Heart/Vascular: The heart size, mediastinum, hilum and aorta are within   normal limits for projection. Atherosclerotic change.  Pulmonary: Midline trachea. There is no focal infiltrate, congestion or   effusion.    Bones: There is no fracture.Osteopenia.  Lines and catheter: None    Impression:    No acute pulmonary disease.    --- End of Report ---    < end of copied text >      Imaging Personally Reviewed:  YES    Consultant(s) Notes Reviewed:   YES      Plan of care was discussed with patient and /or primary care giver; all questions and concerns were addressed and care was aligned with patient's wishes.

## 2023-04-03 NOTE — PROGRESS NOTE ADULT - PROBLEM SELECTOR PLAN 1
- h/o of dysphagia  x 4 weeks  - S/p Barium Swallow showed narrowing and negative for obstruction  - GI consulted, possible EGD, however cannot reach his health care proxy [place] for consent.  - will likely need 2 physician consent  - S/p Speech and swallow, was rec soft and bite sized but patient not tolerating at this time will change to pureed

## 2023-04-03 NOTE — PROGRESS NOTE ADULT - PROBLEM SELECTOR PLAN 2
- (+) UA   - H/o ESBL E.Coli bacteremia admission 4/2020 treated with 10 day course of zosyn and started on Meropenem.    - S/p Ceftriaxone, Azithromycin, & Meropenem.    - UCx and BCx negative.   - ID Dr. Calhoun followed, completed Ceftriaxone x 3 days

## 2023-04-03 NOTE — PROGRESS NOTE ADULT - SUBJECTIVE AND OBJECTIVE BOX
Patient is a 89y old  Male who presents with a chief complaint of UTI, dysphagia (31 Mar 2023 15:35)    PATIENT IS SEEN AND EXAMINED IN MEDICAL FLOOR.  NGT [    ]    ROOT [   ]      GT [   ]    ALLERGIES:  No Known Allergies      Daily     Daily     VITALS:    Vital Signs Last 24 Hrs  T(C): 36.7 (03 Apr 2023 05:12), Max: 37.1 (02 Apr 2023 13:54)  T(F): 98.1 (03 Apr 2023 05:12), Max: 98.8 (02 Apr 2023 13:54)  HR: 82 (03 Apr 2023 05:12) (82 - 89)  BP: 122/65 (03 Apr 2023 05:12) (120/77 - 132/74)  BP(mean): --  RR: 18 (03 Apr 2023 05:12) (18 - 18)  SpO2: 98% (03 Apr 2023 05:12) (93% - 100%)    Parameters below as of 03 Apr 2023 05:12  Patient On (Oxygen Delivery Method): room air        LABS:    CBC Full  -  ( 03 Apr 2023 05:55 )  WBC Count : 8.85 K/uL  RBC Count : 3.98 M/uL  Hemoglobin : 10.7 g/dL  Hematocrit : 34.7 %  Platelet Count - Automated : 399 K/uL  Mean Cell Volume : 87.2 fl  Mean Cell Hemoglobin : 26.9 pg  Mean Cell Hemoglobin Concentration : 30.8 gm/dL  Auto Neutrophil # : x  Auto Lymphocyte # : x  Auto Monocyte # : x  Auto Eosinophil # : x  Auto Basophil # : x  Auto Neutrophil % : x  Auto Lymphocyte % : x  Auto Monocyte % : x  Auto Eosinophil % : x  Auto Basophil % : x      04-03    140  |  108  |  4<L>  ----------------------------<  123<H>  3.5   |  28  |  0.75    Ca    9.0      03 Apr 2023 05:55  Phos  2.7     04-03  Mg     2.1     04-03      CAPILLARY BLOOD GLUCOSE              Creatinine Trend: 0.75<--, 0.79<--, 0.76<--, 0.86<--, 0.92<--, 1.09<--  I&O's Summary          .Urine  03-28 @ 14:55   No growth  --  --      .Blood  03-28 @ 13:45   No Growth Final  --  --          MEDICATIONS:    MEDICATIONS  (STANDING):  ascorbic acid 500 milliGRAM(s) Oral daily  aspirin  chewable 81 milliGRAM(s) Oral daily  dextrose 5% + sodium chloride 0.9%. 1000 milliLiter(s) (75 mL/Hr) IV Continuous <Continuous>  finasteride 5 milliGRAM(s) Oral daily  guaiFENesin Oral Liquid (Sugar-Free) 200 milliGRAM(s) Oral every 6 hours  heparin   Injectable 5000 Unit(s) SubCutaneous every 12 hours  latanoprost 0.005% Ophthalmic Solution 1 Drop(s) Both EYES at bedtime  levothyroxine 25 MICROGram(s) Oral daily  metoprolol succinate ER 25 milliGRAM(s) Oral daily  pantoprazole    Tablet 40 milliGRAM(s) Oral before breakfast  simvastatin 20 milliGRAM(s) Oral at bedtime  tamsulosin 0.4 milliGRAM(s) Oral at bedtime      MEDICATIONS  (PRN):      REVIEW OF SYSTEMS:                           ALL ROS DONE [ X   ]    CONSTITUTIONAL:  LETHARGIC [   ], FEVER [   ], UNRESPONSIVE [   ]  CVS:  CP  [   ], SOB, [   ], PALPITATIONS [   ], DIZZYNESS [   ]  RS: COUGH [   ], SPUTUM [   ]  GI: ABDOMINAL PAIN [   ], NAUSEA [   ], VOMITINGS [   ], DIARRHEA [   ], CONSTIPATION [   ]  :  DYSURIA [   ], NOCTURIA [   ], INCREASED FREQUENCY [   ], DRIBLING [   ],  SKELETAL: PAINFUL JOINTS [   ], SWOLLEN JOINTS [   ], NECK ACHE [   ], LOW BACK ACHE [   ],  SKIN : ULCERS [   ], RASH [   ], ITCHING [   ]  CNS: HEAD ACHE [   ], DOUBLE VISION [   ], BLURRED VISION [   ], AMS / CONFUSION [   ], SEIZURES [   ], WEAKNESS [   ],TINGLING / NUMBNESS [   ]    PHYSICAL EXAMINATION:  GENERAL APPEARANCE: NO DISTRESS  HEENT:  NO PALLOR, NO  JVD,  NO   NODES, NECK SUPPLE  CVS: S1 +, S2 +,   RS: AEEB,  OCCASIONAL  RALES +,   NO RONCHI  ABD: SOFT, NT, NO, BS +  EXT: NO PE  SKIN: WARM,   SKELETAL:  ROM ACCEPTABLE  CNS:  AAO X 1 ,   DEFICITS    RADIOLOGY :    RADIOLOGY AND READINGS REVIEWED      ASSESSMENT :     Urinary tract infection    No pertinent past medical history    Hypothyroidism    HTN (hypertension)    HLD (hyperlipidemia)    Glaucoma    GERD (gastroesophageal reflux disease)    BPH (benign prostatic hyperplasia)    Prostate cancer    No significant past surgical history        PLAN:  HPI:  87 year old male from [facility] with PMHx of hypertension, hyperlipidemia. glaucoma, GERD, BPH, hypothyroidism, and prostate cancer, baseline A&Ox1, presenting from NH after difficulty swallowing for x4 weeks. Pt. denies difficulty swallowing during exam, and denies any chest pain, palpitations, headache, urinary discomfort abdominal pain, fever, chills. Only oriented to self, thinks he is in "[facility]", and said the year was "3000". Pt is not a good historian, was unable to get in contact with HCP Maria Ines Serrano. Was admitted in the past for ESBL E.Coli bacteremia in 4/2020, stay complicated by COVID infection, most recent ED trip s/p mechanical fall 10/2021.     ED vitals:154/78 BP, 17 RR, 74 HR, 98.1 F  Labs: WC 19.83, BMP wnl, UA pos nitrites, leuk esterase, 6-10 WBC, 25-50 RBCs   BCx and UCx pending  Barium swallow Xray pending  (28 Mar 2023 19:14)    # ATTEMPTED TO CALL HCP MARIA INES SERRANO MULTIPLE TIMES, WILL CONTINUE TO TRY CONTACT    # SEPSIS S/T SUSPECTED UTI, HX OF ESBL ECOLI UTI  - S/P ABX [ CEFEPIME THEN ROCEPHIN], F/U BCX AND UCX - NGTD  - ID CONSULT    # DYSPHAGIA  - ON DIET PER ST EVAL  - IVF  - ASPIRATION PRECAUTIONS  - ST EVAL - SOFT AND BITE SIZED AND THIN LIQUIDS  - F/U BARIUM ESOPHAGRAM - MILDLY DILATED PROXIMAL AND MIDESOPHAGUS WITH MILD NARROWING OF THE DISTAL ESOPHAGUS WITH MILD IRREGULARITY OF ESOPHAGEAL WALL. MILD DELAY IN PASSAGE OF BARIUM THROUGH THE ESOPHAGUS WITH TERTIARY CONTRACTIONS AND INTRA-ESOPHAGEAL REFLUX.  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    ALLERGIES:  No Known Allergies    VITALS:    Vital Signs Last 24 Hrs  T(C): 36.7 (03 Apr 2023 05:12), Max: 37.1 (02 Apr 2023 13:54)  T(F): 98.1 (03 Apr 2023 05:12), Max: 98.8 (02 Apr 2023 13:54)  HR: 82 (03 Apr 2023 05:12) (82 - 89)  BP: 122/65 (03 Apr 2023 05:12) (120/77 - 132/74)  BP(mean): --  RR: 18 (03 Apr 2023 05:12) (18 - 18)  SpO2: 98% (03 Apr 2023 05:12) (93% - 100%)    Parameters below as of 03 Apr 2023 05:12  Patient On (Oxygen Delivery Method): room air        LABS:    CBC Full  -  ( 03 Apr 2023 05:55 )  WBC Count : 8.85 K/uL  RBC Count : 3.98 M/uL  Hemoglobin : 10.7 g/dL  Hematocrit : 34.7 %  Platelet Count - Automated : 399 K/uL  Mean Cell Volume : 87.2 fl  Mean Cell Hemoglobin : 26.9 pg  Mean Cell Hemoglobin Concentration : 30.8 gm/dL  Auto Neutrophil # : x  Auto Lymphocyte # : x  Auto Monocyte # : x  Auto Eosinophil # : x  Auto Basophil # : x  Auto Neutrophil % : x  Auto Lymphocyte % : x  Auto Monocyte % : x  Auto Eosinophil % : x  Auto Basophil % : x      04-03    140  |  108  |  4<L>  ----------------------------<  123<H>  3.5   |  28  |  0.75    Ca    9.0      03 Apr 2023 05:55  Phos  2.7     04-03  Mg     2.1     04-03      CAPILLARY BLOOD GLUCOSE              Creatinine Trend: 0.75<--, 0.79<--, 0.76<--, 0.86<--, 0.92<--, 1.09<--  I&O's Summary          .Urine  03-28 @ 14:55   No growth  --  --      .Blood  03-28 @ 13:45   No Growth Final  --  --          MEDICATIONS:    MEDICATIONS  (STANDING):  ascorbic acid 500 milliGRAM(s) Oral daily  aspirin  chewable 81 milliGRAM(s) Oral daily  dextrose 5% + sodium chloride 0.9%. 1000 milliLiter(s) (75 mL/Hr) IV Continuous <Continuous>  finasteride 5 milliGRAM(s) Oral daily  guaiFENesin Oral Liquid (Sugar-Free) 200 milliGRAM(s) Oral every 6 hours  heparin   Injectable 5000 Unit(s) SubCutaneous every 12 hours  latanoprost 0.005% Ophthalmic Solution 1 Drop(s) Both EYES at bedtime  levothyroxine 25 MICROGram(s) Oral daily  metoprolol succinate ER 25 milliGRAM(s) Oral daily  pantoprazole    Tablet 40 milliGRAM(s) Oral before breakfast  simvastatin 20 milliGRAM(s) Oral at bedtime  tamsulosin 0.4 milliGRAM(s) Oral at bedtime      MEDICATIONS  (PRN):      REVIEW OF SYSTEMS:                           ALL ROS DONE [ X   ]    CONSTITUTIONAL:  LETHARGIC [   ], FEVER [   ], UNRESPONSIVE [   ]  CVS:  CP  [   ], SOB, [   ], PALPITATIONS [   ], DIZZYNESS [   ]  RS: COUGH [   ], SPUTUM [   ]  GI: ABDOMINAL PAIN [   ], NAUSEA [   ], VOMITINGS [   ], DIARRHEA [   ], CONSTIPATION [   ]  :  DYSURIA [   ], NOCTURIA [   ], INCREASED FREQUENCY [   ], DRIBLING [   ],  SKELETAL: PAINFUL JOINTS [   ], SWOLLEN JOINTS [   ], NECK ACHE [   ], LOW BACK ACHE [   ],  SKIN : ULCERS [   ], RASH [   ], ITCHING [   ]  CNS: HEAD ACHE [   ], DOUBLE VISION [   ], BLURRED VISION [   ], AMS / CONFUSION [   ], SEIZURES [   ], WEAKNESS [   ],TINGLING / NUMBNESS [   ]    PHYSICAL EXAMINATION:  GENERAL APPEARANCE: NO DISTRESS  HEENT:  NO PALLOR, NO  JVD,  NO   NODES, NECK SUPPLE  CVS: S1 +, S2 +,   RS: AEEB,  OCCASIONAL  RALES +,   NO RONCHI  ABD: SOFT, NT, NO, BS +  EXT: NO PE  SKIN: WARM,   SKELETAL:  ROM ACCEPTABLE  CNS:  AAO X 1 ,   DEFICITS    RADIOLOGY :    RADIOLOGY AND READINGS REVIEWED      ASSESSMENT :     Urinary tract infection    No pertinent past medical history    Hypothyroidism    HTN (hypertension)    HLD (hyperlipidemia)    Glaucoma    GERD (gastroesophageal reflux disease)    BPH (benign prostatic hyperplasia)    Prostate cancer    No significant past surgical history        PLAN:  HPI:  87 year old male from [facility] with PMHx of hypertension, hyperlipidemia. glaucoma, GERD, BPH, hypothyroidism, and prostate cancer, baseline A&Ox1, presenting from NH after difficulty swallowing for x4 weeks. Pt. denies difficulty swallowing during exam, and denies any chest pain, palpitations, headache, urinary discomfort abdominal pain, fever, chills. Only oriented to self, thinks he is in "[facility]", and said the year was "3000". Pt is not a good historian, was unable to get in contact with HCP Maria Ines Serrano. Was admitted in the past for ESBL E.Coli bacteremia in 4/2020, stay complicated by COVID infection, most recent ED trip s/p mechanical fall 10/2021.     ED vitals:154/78 BP, 17 RR, 74 HR, 98.1 F  Labs: WC 19.83, BMP wnl, UA pos nitrites, leuk esterase, 6-10 WBC, 25-50 RBCs   BCx and UCx pending  Barium swallow Xray pending  (28 Mar 2023 19:14)    # ATTEMPTED TO CALL HCP MARIA INES SERRANO MULTIPLE TIMES, WILL CONTINUE TO TRY CONTACT    # SEPSIS S/T SUSPECTED UTI, HX OF ESBL ECOLI UTI  - S/P ABX [ CEFEPIME THEN ROCEPHIN], F/U BCX AND UCX - NGTD  - ID CONSULT    # DYSPHAGIA  - ON DIET PER ST EVAL  - IVF  - ASPIRATION PRECAUTIONS  - ST EVAL - SOFT AND BITE SIZED AND THIN LIQUIDS  - F/U BARIUM ESOPHAGRAM - MILDLY DILATED PROXIMAL AND MIDESOPHAGUS WITH MILD NARROWING OF THE DISTAL ESOPHAGUS WITH MILD IRREGULARITY OF ESOPHAGEAL WALL. MILD DELAY IN PASSAGE OF BARIUM THROUGH THE ESOPHAGUS WITH TERTIARY CONTRACTIONS AND INTRA-ESOPHAGEAL REFLUX.  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

## 2023-04-03 NOTE — PROGRESS NOTE ADULT - ASSESSMENT
87 year old male from [facility] with PMHx of hypertension, hyperlipidemia. glaucoma, GERD, BPH, hypothyroidism, and prostate cancer, baseline A&Ox1. Presented to the ED from [facility] after difficulty swallowing for x 4 weeks. Admitted for Dysphagia workup, also found to have UTI, initially placed on Cefepime, ID followed and abx switched to Ceftriaxone, completed the course. GI Dr. Sumner following, Esophagram recommended and showed Mildly dilated proximal and midesophagus with mild narrowing of the distal esophagus with mild irregularity of esophageal wall. EGD recommended. HCP unable to be contacted, multiple attempts made, will need 2 physician consent for EGD. PT reccs SHARDA

## 2023-04-04 LAB
ANION GAP SERPL CALC-SCNC: 3 MMOL/L — LOW (ref 5–17)
BUN SERPL-MCNC: 6 MG/DL — LOW (ref 7–18)
CALCIUM SERPL-MCNC: 9.2 MG/DL — SIGNIFICANT CHANGE UP (ref 8.4–10.5)
CHLORIDE SERPL-SCNC: 107 MMOL/L — SIGNIFICANT CHANGE UP (ref 96–108)
CO2 SERPL-SCNC: 27 MMOL/L — SIGNIFICANT CHANGE UP (ref 22–31)
CREAT SERPL-MCNC: 0.94 MG/DL — SIGNIFICANT CHANGE UP (ref 0.5–1.3)
EGFR: 77 ML/MIN/1.73M2 — SIGNIFICANT CHANGE UP
GLUCOSE SERPL-MCNC: 126 MG/DL — HIGH (ref 70–99)
HCT VFR BLD CALC: 35.1 % — LOW (ref 39–50)
HGB BLD-MCNC: 10.9 G/DL — LOW (ref 13–17)
MAGNESIUM SERPL-MCNC: 2.2 MG/DL — SIGNIFICANT CHANGE UP (ref 1.6–2.6)
MCHC RBC-ENTMCNC: 26.8 PG — LOW (ref 27–34)
MCHC RBC-ENTMCNC: 31.1 GM/DL — LOW (ref 32–36)
MCV RBC AUTO: 86.5 FL — SIGNIFICANT CHANGE UP (ref 80–100)
NRBC # BLD: 0 /100 WBCS — SIGNIFICANT CHANGE UP (ref 0–0)
PHOSPHATE SERPL-MCNC: 3 MG/DL — SIGNIFICANT CHANGE UP (ref 2.5–4.5)
PLATELET # BLD AUTO: 400 K/UL — SIGNIFICANT CHANGE UP (ref 150–400)
POTASSIUM SERPL-MCNC: 3.7 MMOL/L — SIGNIFICANT CHANGE UP (ref 3.5–5.3)
POTASSIUM SERPL-SCNC: 3.7 MMOL/L — SIGNIFICANT CHANGE UP (ref 3.5–5.3)
RBC # BLD: 4.06 M/UL — LOW (ref 4.2–5.8)
RBC # FLD: 15.9 % — HIGH (ref 10.3–14.5)
SODIUM SERPL-SCNC: 137 MMOL/L — SIGNIFICANT CHANGE UP (ref 135–145)
WBC # BLD: 9.76 K/UL — SIGNIFICANT CHANGE UP (ref 3.8–10.5)
WBC # FLD AUTO: 9.76 K/UL — SIGNIFICANT CHANGE UP (ref 3.8–10.5)

## 2023-04-04 RX ADMIN — Medication 81 MILLIGRAM(S): at 13:00

## 2023-04-04 RX ADMIN — Medication 25 MILLIGRAM(S): at 05:25

## 2023-04-04 RX ADMIN — SODIUM CHLORIDE 75 MILLILITER(S): 9 INJECTION, SOLUTION INTRAVENOUS at 08:05

## 2023-04-04 RX ADMIN — TAMSULOSIN HYDROCHLORIDE 0.4 MILLIGRAM(S): 0.4 CAPSULE ORAL at 21:18

## 2023-04-04 RX ADMIN — SIMVASTATIN 20 MILLIGRAM(S): 20 TABLET, FILM COATED ORAL at 21:18

## 2023-04-04 RX ADMIN — Medication 25 MICROGRAM(S): at 05:25

## 2023-04-04 RX ADMIN — FINASTERIDE 5 MILLIGRAM(S): 5 TABLET, FILM COATED ORAL at 13:00

## 2023-04-04 RX ADMIN — PANTOPRAZOLE SODIUM 40 MILLIGRAM(S): 20 TABLET, DELAYED RELEASE ORAL at 05:26

## 2023-04-04 RX ADMIN — Medication 500 MILLIGRAM(S): at 13:00

## 2023-04-04 RX ADMIN — HEPARIN SODIUM 5000 UNIT(S): 5000 INJECTION INTRAVENOUS; SUBCUTANEOUS at 17:22

## 2023-04-04 RX ADMIN — LATANOPROST 1 DROP(S): 0.05 SOLUTION/ DROPS OPHTHALMIC; TOPICAL at 21:39

## 2023-04-04 RX ADMIN — HEPARIN SODIUM 5000 UNIT(S): 5000 INJECTION INTRAVENOUS; SUBCUTANEOUS at 05:25

## 2023-04-04 NOTE — PROGRESS NOTE ADULT - ASSESSMENT
89 year old male from [facility] with PMHx of hypertension, hyperlipidemia. glaucoma, GERD, BPH, hypothyroidism, and prostate cancer, baseline A&Ox1. Presented to the ED from [facility] after difficulty swallowing for x 4 weeks. Admitted for Dysphagia workup, also found to have UTI, initially placed on Cefepime, ID followed and abx switched to Ceftriaxone, completed the course. GI Dr. Sumner following, Esophagram recommended and showed Mildly dilated proximal and midesophagus with mild narrowing of the distal esophagus with mild irregularity of esophageal wall. EGD recommended. HCP unable to be contacted, multiple attempts made, will need 2 physician consent for EGD. PT reccs SHARDA

## 2023-04-04 NOTE — PROGRESS NOTE ADULT - SUBJECTIVE AND OBJECTIVE BOX
Patient is a 89y old  Male who presents with a chief complaint of UTI, dysphagia (31 Mar 2023 15:35)    PATIENT IS SEEN AND EXAMINED IN MEDICAL FLOOR.  NGT [    ]    ROOT [   ]      GT [   ]    ALLERGIES:  No Known Allergies      Daily     Daily     VITALS:    Vital Signs Last 24 Hrs  T(C): 36.7 (04 Apr 2023 21:35), Max: 37.1 (04 Apr 2023 13:33)  T(F): 98 (04 Apr 2023 21:35), Max: 98.7 (04 Apr 2023 13:33)  HR: 101 (04 Apr 2023 21:35) (93 - 101)  BP: 113/70 (04 Apr 2023 21:35) (113/70 - 144/72)  BP(mean): --  RR: 17 (04 Apr 2023 21:35) (17 - 20)  SpO2: 96% (04 Apr 2023 21:35) (96% - 98%)    Parameters below as of 04 Apr 2023 21:35  Patient On (Oxygen Delivery Method): room air        LABS:    CBC Full  -  ( 04 Apr 2023 06:40 )  WBC Count : 9.76 K/uL  RBC Count : 4.06 M/uL  Hemoglobin : 10.9 g/dL  Hematocrit : 35.1 %  Platelet Count - Automated : 400 K/uL  Mean Cell Volume : 86.5 fl  Mean Cell Hemoglobin : 26.8 pg  Mean Cell Hemoglobin Concentration : 31.1 gm/dL  Auto Neutrophil # : x  Auto Lymphocyte # : x  Auto Monocyte # : x  Auto Eosinophil # : x  Auto Basophil # : x  Auto Neutrophil % : x  Auto Lymphocyte % : x  Auto Monocyte % : x  Auto Eosinophil % : x  Auto Basophil % : x      04-04    137  |  107  |  6<L>  ----------------------------<  126<H>  3.7   |  27  |  0.94    Ca    9.2      04 Apr 2023 06:40  Phos  3.0     04-04  Mg     2.2     04-04      CAPILLARY BLOOD GLUCOSE              Creatinine Trend: 0.94<--, 0.75<--, 0.79<--, 0.76<--, 0.86<--, 0.92<--  I&O's Summary          .Urine  03-28 @ 14:55   No growth  --  --      .Blood  03-28 @ 13:45   No Growth Final  --  --          MEDICATIONS:    MEDICATIONS  (STANDING):  ascorbic acid 500 milliGRAM(s) Oral daily  aspirin  chewable 81 milliGRAM(s) Oral daily  dextrose 5% + sodium chloride 0.9%. 1000 milliLiter(s) (75 mL/Hr) IV Continuous <Continuous>  finasteride 5 milliGRAM(s) Oral daily  guaiFENesin Oral Liquid (Sugar-Free) 200 milliGRAM(s) Oral every 6 hours  heparin   Injectable 5000 Unit(s) SubCutaneous every 12 hours  latanoprost 0.005% Ophthalmic Solution 1 Drop(s) Both EYES at bedtime  levothyroxine 25 MICROGram(s) Oral daily  metoprolol succinate ER 25 milliGRAM(s) Oral daily  pantoprazole    Tablet 40 milliGRAM(s) Oral before breakfast  simvastatin 20 milliGRAM(s) Oral at bedtime  tamsulosin 0.4 milliGRAM(s) Oral at bedtime      MEDICATIONS  (PRN):      REVIEW OF SYSTEMS:                           ALL ROS DONE [ X   ]    CONSTITUTIONAL:  LETHARGIC [   ], FEVER [   ], UNRESPONSIVE [   ]  CVS:  CP  [   ], SOB, [   ], PALPITATIONS [   ], DIZZYNESS [   ]  RS: COUGH [   ], SPUTUM [   ]  GI: ABDOMINAL PAIN [   ], NAUSEA [   ], VOMITINGS [   ], DIARRHEA [   ], CONSTIPATION [   ]  :  DYSURIA [   ], NOCTURIA [   ], INCREASED FREQUENCY [   ], DRIBLING [   ],  SKELETAL: PAINFUL JOINTS [   ], SWOLLEN JOINTS [   ], NECK ACHE [   ], LOW BACK ACHE [   ],  SKIN : ULCERS [   ], RASH [   ], ITCHING [   ]  CNS: HEAD ACHE [   ], DOUBLE VISION [   ], BLURRED VISION [   ], AMS / CONFUSION [   ], SEIZURES [   ], WEAKNESS [   ],TINGLING / NUMBNESS [   ]    PHYSICAL EXAMINATION:  GENERAL APPEARANCE: NO DISTRESS  HEENT:  NO PALLOR, NO  JVD,  NO   NODES, NECK SUPPLE  CVS: S1 +, S2 +,   RS: AEEB,  OCCASIONAL  RALES +,   NO RONCHI  ABD: SOFT, NT, NO, BS +  EXT: NO PE  SKIN: WARM,   SKELETAL:  ROM ACCEPTABLE  CNS:  AAO X 1 ,   DEFICITS    RADIOLOGY :    RADIOLOGY AND READINGS REVIEWED      ASSESSMENT :     Urinary tract infection    No pertinent past medical history    Hypothyroidism    HTN (hypertension)    HLD (hyperlipidemia)    Glaucoma    GERD (gastroesophageal reflux disease)    BPH (benign prostatic hyperplasia)    Prostate cancer    No significant past surgical history        PLAN:  HPI:  87 year old male from [facility] with PMHx of hypertension, hyperlipidemia. glaucoma, GERD, BPH, hypothyroidism, and prostate cancer, baseline A&Ox1, presenting from NH after difficulty swallowing for x4 weeks. Pt. denies difficulty swallowing during exam, and denies any chest pain, palpitations, headache, urinary discomfort abdominal pain, fever, chills. Only oriented to self, thinks he is in "[facility]", and said the year was "3000". Pt is not a good historian, was unable to get in contact with HCP Maria Ines Serrano. Was admitted in the past for ESBL E.Coli bacteremia in 4/2020, stay complicated by COVID infection, most recent ED trip s/p mechanical fall 10/2021.     ED vitals:154/78 BP, 17 RR, 74 HR, 98.1 F  Labs: WC 19.83, BMP wnl, UA pos nitrites, leuk esterase, 6-10 WBC, 25-50 RBCs   BCx and UCx pending  Barium swallow Xray pending  (28 Mar 2023 19:14)    # ATTEMPTED TO CALL HCP MARIA INES SERRANO MULTIPLE TIMES, WILL CONTINUE TO TRY CONTACT. AFTER EXTENSIVE SEARCH AND COLLABORATION WITH 5TH FLOOR PATIENT LIASON AND SW TEAM - CONTACT WAS MADE BY TEAM WITH RELATIVE MANA SERRANO @ [phone]     # SEPSIS S/T SUSPECTED UTI, HX OF ESBL ECOLI UTI  - S/P ABX [ CEFEPIME THEN ROCEPHIN], F/U BCX AND UCX - NGTD  - ID CONSULT    # DYSPHAGIA  - ON DIET PER ST EVAL  - IVF  - ASPIRATION PRECAUTIONS  - ST EVAL - SOFT AND BITE SIZED AND THIN LIQUIDS  - F/U BARIUM ESOPHAGRAM - MILDLY DILATED PROXIMAL AND MIDESOPHAGUS WITH MILD NARROWING OF THE DISTAL ESOPHAGUS WITH MILD IRREGULARITY OF ESOPHAGEAL WALL. MILD DELAY IN PASSAGE OF BARIUM THROUGH THE ESOPHAGUS WITH TERTIARY CONTRACTIONS AND INTRA-ESOPHAGEAL REFLUX.  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

## 2023-04-04 NOTE — PROGRESS NOTE ADULT - SUBJECTIVE AND OBJECTIVE BOX
NP Note discussed with  Primary Attending    Patient is a 89y old  Male who presents with a chief complaint of UTI, dysphagia (31 Mar 2023 15:35)      INTERVAL HPI/OVERNIGHT EVENTS: no acute events overnight    MEDICATIONS  (STANDING):  ascorbic acid 500 milliGRAM(s) Oral daily  aspirin  chewable 81 milliGRAM(s) Oral daily  dextrose 5% + sodium chloride 0.9%. 1000 milliLiter(s) (75 mL/Hr) IV Continuous <Continuous>  finasteride 5 milliGRAM(s) Oral daily  guaiFENesin Oral Liquid (Sugar-Free) 200 milliGRAM(s) Oral every 6 hours  heparin   Injectable 5000 Unit(s) SubCutaneous every 12 hours  latanoprost 0.005% Ophthalmic Solution 1 Drop(s) Both EYES at bedtime  levothyroxine 25 MICROGram(s) Oral daily  metoprolol succinate ER 25 milliGRAM(s) Oral daily  pantoprazole    Tablet 40 milliGRAM(s) Oral before breakfast  simvastatin 20 milliGRAM(s) Oral at bedtime  tamsulosin 0.4 milliGRAM(s) Oral at bedtime    MEDICATIONS  (PRN):      __________________________________________________  REVIEW OF SYSTEMS:    limited due to mental status    RESPIRATORY: No cough; No shortness of breath  CARDIOVASCULAR: No chest pain, no palpitations  GASTROINTESTINAL: No pain. No nausea or vomiting; No diarrhea   MUSCULOSKELETAL: no muscle pain  GENITOURINARY: no dysuria, no frequency, no hesitancy      Vital Signs Last 24 Hrs  T(C): 36.9 (04 Apr 2023 04:54), Max: 37.4 (03 Apr 2023 14:10)  T(F): 98.4 (04 Apr 2023 04:54), Max: 99.4 (03 Apr 2023 14:10)  HR: 93 (04 Apr 2023 04:54) (90 - 106)  BP: 124/73 (04 Apr 2023 04:54) (115/76 - 132/78)  BP(mean): --  RR: 20 (04 Apr 2023 04:54) (18 - 20)  SpO2: 97% (04 Apr 2023 04:54) (95% - 97%)    Parameters below as of 04 Apr 2023 04:54  Patient On (Oxygen Delivery Method): room air        ________________________________________________  PHYSICAL EXAM:  GENERAL: NAD  HEENT: Normocephalic  CHEST/LUNG: Clear to auscultation bilaterally  HEART: S1 S2  regular  ABDOMEN: Soft, Nontender, Nondistended; Bowel sounds present  EXTREMITIES: no cyanosis; no edema  SKIN: warm and dry; no rash  NERVOUS SYSTEM:  Awake and alert; Oriented  to person  _________________________________________________  LABS:                        10.9   9.76  )-----------( 400      ( 04 Apr 2023 06:40 )             35.1     04-04    137  |  107  |  6<L>  ----------------------------<  126<H>  3.7   |  27  |  0.94    Ca    9.2      04 Apr 2023 06:40  Phos  3.0     04-04  Mg     2.2     04-04          CAPILLARY BLOOD GLUCOSE            RADIOLOGY & ADDITIONAL TESTS:    < from: Xray Esophagram Single Contrast (03.29.23 @ 14:12) >  FINDINGS:    Lungs are clear on the  view. Cardiomediastinal silhouette within   normal limits. No acute osseous abnormality.    The examination was technically limited secondary to difficulty with   patient positioning; patient was imagedwith the bed at approximately 45   degrees with the patient in the right posterior oblique position.    Mildly dilated proximal and midesophagus. Mild irregularity of the distal   esophageal wall with mild associated narrowing. There is a mild delay in   passage of contrast through the esophagus. Tertiary contractions and   intraesophageal reflux are noted.    There is a small to moderate hiatal hernia.    IMPRESSION:    Mildly dilated proximal and midesophagus with mild narrowing of the   distalesophagus with mild irregularity of esophageal wall; an endoscopy   is recommended for further evaluation if feasible.    Mild delay in passage of barium through the esophagus with tertiary   contractions and intraesophageal reflux.    Small to moderate hiatal hernia.    --- End of Report ---    < end of copied text >    < from: Xray Chest 1 View- PORTABLE-Urgent (03.28.23 @ 18:53) >  INDINGS:  Heart/Vascular: The heart size, mediastinum, hilum and aorta are within   normal limits for projection. Atherosclerotic change.  Pulmonary: Midline trachea. There is no focal infiltrate, congestion or   effusion.    Bones: There is no fracture.Osteopenia.  Lines and catheter: None    Impression:    No acute pulmonary disease.    --- End of Report ---    < end of copied text >    Imaging Personally Reviewed:  YES    Consultant(s) Notes Reviewed:   YES      Plan of care was discussed with patient and /or primary care giver; all questions and concerns were addressed and care was aligned with patient's wishes.

## 2023-04-05 RX ADMIN — TAMSULOSIN HYDROCHLORIDE 0.4 MILLIGRAM(S): 0.4 CAPSULE ORAL at 22:07

## 2023-04-05 RX ADMIN — LATANOPROST 1 DROP(S): 0.05 SOLUTION/ DROPS OPHTHALMIC; TOPICAL at 22:08

## 2023-04-05 RX ADMIN — HEPARIN SODIUM 5000 UNIT(S): 5000 INJECTION INTRAVENOUS; SUBCUTANEOUS at 17:21

## 2023-04-05 RX ADMIN — FINASTERIDE 5 MILLIGRAM(S): 5 TABLET, FILM COATED ORAL at 12:48

## 2023-04-05 RX ADMIN — Medication 81 MILLIGRAM(S): at 12:47

## 2023-04-05 RX ADMIN — SIMVASTATIN 20 MILLIGRAM(S): 20 TABLET, FILM COATED ORAL at 22:07

## 2023-04-05 RX ADMIN — PANTOPRAZOLE SODIUM 40 MILLIGRAM(S): 20 TABLET, DELAYED RELEASE ORAL at 06:05

## 2023-04-05 RX ADMIN — HEPARIN SODIUM 5000 UNIT(S): 5000 INJECTION INTRAVENOUS; SUBCUTANEOUS at 06:05

## 2023-04-05 RX ADMIN — Medication 25 MICROGRAM(S): at 06:05

## 2023-04-05 RX ADMIN — Medication 25 MILLIGRAM(S): at 06:05

## 2023-04-05 RX ADMIN — Medication 500 MILLIGRAM(S): at 12:48

## 2023-04-05 NOTE — PROGRESS NOTE ADULT - PROBLEM SELECTOR PLAN 7
- DVT ppx: Heparin  - GI ppx: PPI
H/o BPH on Finasteride and Tamsulosin   - C/w Finasteride and Tamsulosin
- DVT ppx: Heparin  - GI ppx: PPI
Continue finasteride, tamsulosin
H/o BPH on Finasteride and Tamsulosin   - C/w Finasteride and Tamsulosin

## 2023-04-05 NOTE — PROGRESS NOTE ADULT - PROBLEM SELECTOR PROBLEM 7
BPH (benign prostatic hyperplasia)
BPH (benign prostatic hyperplasia)
Prophylactic measure
Prophylactic measure
BPH (benign prostatic hyperplasia)

## 2023-04-05 NOTE — PROGRESS NOTE ADULT - PROBLEM SELECTOR PLAN 3
- continue home dose statin
- continue home dose statin
Controlled   continue home dose Metop
H/o HLD on Simvastatin  - c/w home medication
H/o HLD on Simvastatin  - c/w home medication

## 2023-04-05 NOTE — PROGRESS NOTE ADULT - PROBLEM SELECTOR PLAN 1
h/o of dysphagia  x 4 weeks  S/p Barium Swallow showed narrowing and negative for obstruction   s/p Speech and swallow, was rec soft and bite sized but patient not tolerating at this time will change to pureed.  No EGD at this time in setting of pt tolerating current diet.

## 2023-04-05 NOTE — CHART NOTE - NSCHARTNOTEFT_GEN_A_CORE
Attempted to contact Mr. Armen Serrano at [phone], patient's family member (HCP [place] , Mr. Ayers is [place]'s ) who is willing to give consent to a possible EGD due to history of dysphagia and abnormal esophagram. Mr. Serrano's voicemail was full, unable to leave a message to discuss risks vs benefits of EGD. Will try again tomorrow.     Signed: Todd Damon NP Attempted to contact Mr. Armen Serrano at [phone], patient's family member (HCP [place] , Mr. Ayers is [place]'s ) who is willing to give consent to a possible EGD due to history of dysphagia and abnormal esophagram. Mr. Serrano's voicemail was full, unable to leave a message to discuss risks vs benefits of EGD.     At this time, patient is tolerating a pureed diet. Given advanced age, comorbidities, patient is considered high-risk for endoscopic evaluation, pursuing an EGD may not necessarily change patient's current management. Given stability of patient's clinical status, defer GI intervention at this time. Plan communicated to primary team.     This note and its recommendations herein are preliminary until such time as cosigned by an attending.    GI will sign off at this time.  Thank you for involving us in the care of MR. Jarrod Valera.  Please re-consult GI PRN. Attempted to contact Mr. Armen Serrano at [phone], patient's family member (HCP [place] , Mr. Ayers is [place]'s ) who is willing to give consent to a possible EGD due to history of dysphagia and abnormal esophagram. Mr. Serrano's voicemail was full, unable to leave a message to discuss risks vs benefits of EGD.     At this time, patient is tolerating a pureed diet. Given advanced age, comorbidities, patient is considered high-risk for endoscopic evaluation, pursuing an EGD may not necessarily change patient's current management. Given stability of patient's clinical status, defer GI intervention at this time. Plan communicated to primary team. Please notify us if HCP wishes to pursue EGD.     This note and its recommendations herein are preliminary until such time as cosigned by an attending.    GI will sign off at this time.  Thank you for involving us in the care of MR. Jarrod Valera.  Please re-consult GI PRN. gf

## 2023-04-05 NOTE — PROGRESS NOTE ADULT - SUBJECTIVE AND OBJECTIVE BOX
NP Note discussed with primary attending.      Patient is a 89y old  Male who presents with a chief complaint of UTI, dysphagia (31 Mar 2023 15:35)      INTERVAL HPI/OVERNIGHT EVENTS:     MEDICATIONS  (STANDING):  ascorbic acid 500 milliGRAM(s) Oral daily  aspirin  chewable 81 milliGRAM(s) Oral daily  dextrose 5% + sodium chloride 0.9%. 1000 milliLiter(s) (75 mL/Hr) IV Continuous <Continuous>  finasteride 5 milliGRAM(s) Oral daily  guaiFENesin Oral Liquid (Sugar-Free) 200 milliGRAM(s) Oral every 6 hours  heparin   Injectable 5000 Unit(s) SubCutaneous every 12 hours  latanoprost 0.005% Ophthalmic Solution 1 Drop(s) Both EYES at bedtime  levothyroxine 25 MICROGram(s) Oral daily  metoprolol succinate ER 25 milliGRAM(s) Oral daily  pantoprazole    Tablet 40 milliGRAM(s) Oral before breakfast  simvastatin 20 milliGRAM(s) Oral at bedtime  tamsulosin 0.4 milliGRAM(s) Oral at bedtime    MEDICATIONS  (PRN):      __________________________________________________  REVIEW OF SYSTEMS:    CONSTITUTIONAL: No fever,   EYES: no acute visual disturbances  NECK: No pain or stiffness  RESPIRATORY: No cough; No shortness of breath  CARDIOVASCULAR: No chest pain, no palpitations  GASTROINTESTINAL: No pain. No nausea or vomiting; No diarrhea   NEUROLOGICAL: No headache or numbness, no tremors  MUSCULOSKELETAL: No joint pain, no muscle pain  GENITOURINARY: no dysuria, no frequency, no hesitancy  PSYCHIATRY: no depression , no anxiety  ALL OTHER  ROS negative        Vital Signs Last 24 Hrs  T(C): 36.7 (05 Apr 2023 05:57), Max: 37.1 (04 Apr 2023 13:33)  T(F): 98.1 (05 Apr 2023 05:57), Max: 98.7 (04 Apr 2023 13:33)  HR: 92 (05 Apr 2023 05:57) (92 - 101)  BP: 111/61 (05 Apr 2023 05:57) (111/61 - 144/72)  BP(mean): --  RR: 17 (05 Apr 2023 05:57) (17 - 18)  SpO2: 99% (05 Apr 2023 05:57) (96% - 99%)    Parameters below as of 05 Apr 2023 05:57  Patient On (Oxygen Delivery Method): room air        ________________________________________________  PHYSICAL EXAM:  GENERAL: NAD  HEENT: Normocephalic;  conjunctivae and sclerae clear; moist mucous membranes;   NECK : supple  CHEST/LUNG: Clear to auscultation bilaterally with good air entry   HEART: S1 S2  regular; no murmurs, gallops or rubs  ABDOMEN: Soft, Nontender, Nondistended; Bowel sounds present  EXTREMITIES: no cyanosis; no edema; no calf tenderness  SKIN: warm and dry; no rash  NERVOUS SYSTEM:  Awake and alert; no new deficits    _________________________________________________  LABS:                        10.9   9.76  )-----------( 400      ( 04 Apr 2023 06:40 )             35.1     04-04    137  |  107  |  6<L>  ----------------------------<  126<H>  3.7   |  27  |  0.94    Ca    9.2      04 Apr 2023 06:40  Phos  3.0     04-04  Mg     2.2     04-04          CAPILLARY BLOOD GLUCOSE      RADIOLOGY & ADDITIONAL TESTS:        Consultant(s) Notes Reviewed:   YES    Care Discussed with Consultants :     Plan of care was discussed with patient and /or primary care giver; all questions and concerns were addressed and care was aligned with patient's wishes.     NP Note discussed with primary attending.      Patient is a 89y old  Male who presents with a chief complaint of UTI, dysphagia (31 Mar 2023 15:35)      INTERVAL HPI/OVERNIGHT EVENTS: No acute events overnight.   Pt seen and examined this morning.   Pt awake/alert, oriented x 2, endorses feeling . Tolerating po. Pt denies SOB, chest discomfort, N/V/abdominal discomfort.   Pt denies having any children. Pt does not remember who [place] or Armen Gutiérrez are.     MEDICATIONS  (STANDING):  ascorbic acid 500 milliGRAM(s) Oral daily  aspirin  chewable 81 milliGRAM(s) Oral daily  dextrose 5% + sodium chloride 0.9%. 1000 milliLiter(s) (75 mL/Hr) IV Continuous <Continuous>  finasteride 5 milliGRAM(s) Oral daily  guaiFENesin Oral Liquid (Sugar-Free) 200 milliGRAM(s) Oral every 6 hours  heparin   Injectable 5000 Unit(s) SubCutaneous every 12 hours  latanoprost 0.005% Ophthalmic Solution 1 Drop(s) Both EYES at bedtime  levothyroxine 25 MICROGram(s) Oral daily  metoprolol succinate ER 25 milliGRAM(s) Oral daily  pantoprazole    Tablet 40 milliGRAM(s) Oral before breakfast  simvastatin 20 milliGRAM(s) Oral at bedtime  tamsulosin 0.4 milliGRAM(s) Oral at bedtime    MEDICATIONS  (PRN):      __________________________________________________  REVIEW OF SYSTEMS:    CONSTITUTIONAL: No fever,   EYES: no acute visual disturbances  NECK: No pain or stiffness  RESPIRATORY: No cough; No shortness of breath  CARDIOVASCULAR: No chest pain, no palpitations  GASTROINTESTINAL: No pain. No nausea or vomiting; No diarrhea   NEUROLOGICAL: No headache or numbness, no tremors  MUSCULOSKELETAL: No joint pain, no muscle pain  GENITOURINARY: no dysuria, no frequency, no hesitancy  PSYCHIATRY: no depression , no anxiety  ALL OTHER  ROS negative        Vital Signs Last 24 Hrs  T(C): 36.7 (05 Apr 2023 05:57), Max: 37.1 (04 Apr 2023 13:33)  T(F): 98.1 (05 Apr 2023 05:57), Max: 98.7 (04 Apr 2023 13:33)  HR: 92 (05 Apr 2023 05:57) (92 - 101)  BP: 111/61 (05 Apr 2023 05:57) (111/61 - 144/72)  BP(mean): --  RR: 17 (05 Apr 2023 05:57) (17 - 18)  SpO2: 99% (05 Apr 2023 05:57) (96% - 99%)    Parameters below as of 05 Apr 2023 05:57  Patient On (Oxygen Delivery Method): room air        ________________________________________________  PHYSICAL EXAM:  GENERAL: NAD  HEENT: Normocephalic;  conjunctivae and sclerae clear; moist mucous membranes;   NECK : supple  CHEST/LUNG: Clear to auscultation bilaterally with good air entry   HEART: S1 S2  regular; no murmurs, gallops or rubs  ABDOMEN: Soft, Nontender, Nondistended; Bowel sounds present  EXTREMITIES: no cyanosis; no edema; no calf tenderness  SKIN: warm and dry; no rash  NERVOUS SYSTEM:  Awake and alert; no new deficits    _________________________________________________  LABS:                        10.9   9.76  )-----------( 400      ( 04 Apr 2023 06:40 )             35.1     04-04    137  |  107  |  6<L>  ----------------------------<  126<H>  3.7   |  27  |  0.94    Ca    9.2      04 Apr 2023 06:40  Phos  3.0     04-04  Mg     2.2     04-04          CAPILLARY BLOOD GLUCOSE      RADIOLOGY & ADDITIONAL TESTS:  < from: Xray Esophagram Single Contrast (03.29.23 @ 14:12) >  IMPRESSION:    Mildly dilated proximal and midesophagus with mild narrowing of the   distalesophagus with mild irregularity of esophageal wall; an endoscopy   is recommended for further evaluation if feasible.    Mild delay in passage of barium through the esophagus with tertiary   contractions and intraesophageal reflux.    Small to moderate hiatal hernia.    < end of copied text >  < from: Xray Chest 1 View- PORTABLE-Urgent (03.28.23 @ 18:53) >  Impression:    No acute pulmonary disease.    < end of copied text >        Consultant(s) Notes Reviewed:   YES    Care Discussed with Consultants :     Plan of care was discussed with patient and /or primary care giver; all questions and concerns were addressed and care was aligned with patient's wishes.

## 2023-04-05 NOTE — PROGRESS NOTE ADULT - PROBLEM SELECTOR PLAN 2
(+) UA   - H/o ESBL E.Coli bacteremia admission 4/2020 treated with 10 day course of zosyn and started on Meropenem.    - S/p Ceftriaxone, Azithromycin, & Meropenem.    - UCx and BCx negative.   - ID Dr. Calhoun followed, completed Ceftriaxone x 3 days.

## 2023-04-05 NOTE — PROGRESS NOTE ADULT - PROBLEM SELECTOR PROBLEM 8
Discharge planning issues
Discharge planning issues
Prophylactic measure
Prostate cancer
Prostate cancer

## 2023-04-05 NOTE — PROGRESS NOTE ADULT - PROBLEM SELECTOR PLAN 5
- continue home dose Synthroid
H/o Hypothyroidism on Synthroid  - C/w Synthroid
- continue home dose Synthroid
Continue levothyroxine
H/o Hypothyroidism on Synthroid  - C/w Synthroid

## 2023-04-05 NOTE — PROGRESS NOTE ADULT - PROBLEM SELECTOR PROBLEM 6
GERD (gastroesophageal reflux disease)
BPH (benign prostatic hyperplasia)
BPH (benign prostatic hyperplasia)
GERD (gastroesophageal reflux disease)
GERD (gastroesophageal reflux disease)

## 2023-04-05 NOTE — PROGRESS NOTE ADULT - PROBLEM SELECTOR PLAN 8
H/o Prostate CA on Finasteride and Tamsulosin   - C/w Finasteride and Tamsulosin
- from [facility]  - pending EGD  - PT reccs [facility]
DVT ppx: Heparin  GI ppx: PPI
- from [facility]  - pending EGD  - PT reccs [facility]
H/o Prostate CA on Finasteride and Tamsulosin   - C/w Finasteride and Tamsulosin

## 2023-04-05 NOTE — PROGRESS NOTE ADULT - ASSESSMENT
89 year old male from [facility] with PMHx of hypertension, hyperlipidemia. glaucoma, GERD, BPH, hypothyroidism, and prostate cancer, baseline A&Ox1. Presented to the ED from [facility] after difficulty swallowing for x 4 weeks. Admitted for Dysphagia workup, also found to have UTI, initially placed on Cefepime, ID followed and abx switched to Ceftriaxone, completed the course. GI Dr. Sumner following, Esophagram recommended and showed Mildly dilated proximal and midesophagus with mild narrowing of the distal esophagus with mild irregularity of esophageal wall. EGD recommended. HCP unable to be contacted, multiple attempts made, will need 2 physician consent for EGD. PT reccs [facility]

## 2023-04-05 NOTE — PROGRESS NOTE ADULT - PROBLEM SELECTOR PLAN 6
H/o GERD on Protonix   - C/w Protonix
H/o BPH on Finasteride and Tamsulosin   - C/w Finasteride and Tamsulosin
H/o BPH on Finasteride and Tamsulosin   - C/w Finasteride and Tamsulosin
Continue PPI
H/o GERD on Protonix   - C/w Protonix

## 2023-04-05 NOTE — PROGRESS NOTE ADULT - PROBLEM SELECTOR PROBLEM 3
HLD (hyperlipidemia)
HTN (hypertension)
HLD (hyperlipidemia)

## 2023-04-05 NOTE — PROGRESS NOTE ADULT - SUBJECTIVE AND OBJECTIVE BOX
Patient is a 89y old  Male who presents with a chief complaint of UTI, dysphagia (31 Mar 2023 15:35)    PATIENT IS SEEN AND EXAMINED IN MEDICAL FLOOR.  NGT [    ]    ROOT [   ]      GT [   ]    ALLERGIES:  No Known Allergies      Daily     Daily     VITALS:    Vital Signs Last 24 Hrs  T(C): 36.7 (05 Apr 2023 05:57), Max: 37.1 (04 Apr 2023 13:33)  T(F): 98.1 (05 Apr 2023 05:57), Max: 98.7 (04 Apr 2023 13:33)  HR: 92 (05 Apr 2023 05:57) (92 - 101)  BP: 111/61 (05 Apr 2023 05:57) (111/61 - 144/72)  BP(mean): --  RR: 17 (05 Apr 2023 05:57) (17 - 18)  SpO2: 99% (05 Apr 2023 05:57) (96% - 99%)    Parameters below as of 05 Apr 2023 05:57  Patient On (Oxygen Delivery Method): room air        LABS:    CBC Full  -  ( 04 Apr 2023 06:40 )  WBC Count : 9.76 K/uL  RBC Count : 4.06 M/uL  Hemoglobin : 10.9 g/dL  Hematocrit : 35.1 %  Platelet Count - Automated : 400 K/uL  Mean Cell Volume : 86.5 fl  Mean Cell Hemoglobin : 26.8 pg  Mean Cell Hemoglobin Concentration : 31.1 gm/dL  Auto Neutrophil # : x  Auto Lymphocyte # : x  Auto Monocyte # : x  Auto Eosinophil # : x  Auto Basophil # : x  Auto Neutrophil % : x  Auto Lymphocyte % : x  Auto Monocyte % : x  Auto Eosinophil % : x  Auto Basophil % : x      04-04    137  |  107  |  6<L>  ----------------------------<  126<H>  3.7   |  27  |  0.94    Ca    9.2      04 Apr 2023 06:40  Phos  3.0     04-04  Mg     2.2     04-04      CAPILLARY BLOOD GLUCOSE              Creatinine Trend: 0.94<--, 0.75<--, 0.79<--, 0.76<--, 0.86<--, 0.92<--  I&O's Summary          .Urine  03-28 @ 14:55   No growth  --  --      .Blood  03-28 @ 13:45   No Growth Final  --  --          MEDICATIONS:    MEDICATIONS  (STANDING):  ascorbic acid 500 milliGRAM(s) Oral daily  aspirin  chewable 81 milliGRAM(s) Oral daily  dextrose 5% + sodium chloride 0.9%. 1000 milliLiter(s) (75 mL/Hr) IV Continuous <Continuous>  finasteride 5 milliGRAM(s) Oral daily  guaiFENesin Oral Liquid (Sugar-Free) 200 milliGRAM(s) Oral every 6 hours  heparin   Injectable 5000 Unit(s) SubCutaneous every 12 hours  latanoprost 0.005% Ophthalmic Solution 1 Drop(s) Both EYES at bedtime  levothyroxine 25 MICROGram(s) Oral daily  metoprolol succinate ER 25 milliGRAM(s) Oral daily  pantoprazole    Tablet 40 milliGRAM(s) Oral before breakfast  simvastatin 20 milliGRAM(s) Oral at bedtime  tamsulosin 0.4 milliGRAM(s) Oral at bedtime      MEDICATIONS  (PRN):      REVIEW OF SYSTEMS:                           ALL ROS DONE [ X   ]    CONSTITUTIONAL:  LETHARGIC [   ], FEVER [   ], UNRESPONSIVE [   ]  CVS:  CP  [   ], SOB, [   ], PALPITATIONS [   ], DIZZYNESS [   ]  RS: COUGH [   ], SPUTUM [   ]  GI: ABDOMINAL PAIN [   ], NAUSEA [   ], VOMITINGS [   ], DIARRHEA [   ], CONSTIPATION [   ]  :  DYSURIA [   ], NOCTURIA [   ], INCREASED FREQUENCY [   ], DRIBLING [   ],  SKELETAL: PAINFUL JOINTS [   ], SWOLLEN JOINTS [   ], NECK ACHE [   ], LOW BACK ACHE [   ],  SKIN : ULCERS [   ], RASH [   ], ITCHING [   ]  CNS: HEAD ACHE [   ], DOUBLE VISION [   ], BLURRED VISION [   ], AMS / CONFUSION [   ], SEIZURES [   ], WEAKNESS [   ],TINGLING / NUMBNESS [   ]    PHYSICAL EXAMINATION:  GENERAL APPEARANCE: NO DISTRESS  HEENT:  NO PALLOR, NO  JVD,  NO   NODES, NECK SUPPLE  CVS: S1 +, S2 +,   RS: AEEB,  OCCASIONAL  RALES +,   NO RONCHI  ABD: SOFT, NT, NO, BS +  EXT: NO PE  SKIN: WARM,   SKELETAL:  ROM ACCEPTABLE  CNS:  AAO X 1 ,   DEFICITS    RADIOLOGY :    RADIOLOGY AND READINGS REVIEWED      ASSESSMENT :     Urinary tract infection    No pertinent past medical history    Hypothyroidism    HTN (hypertension)    HLD (hyperlipidemia)    Glaucoma    GERD (gastroesophageal reflux disease)    BPH (benign prostatic hyperplasia)    Prostate cancer    No significant past surgical history        PLAN:  HPI:  87 year old male from [facility] with PMHx of hypertension, hyperlipidemia. glaucoma, GERD, BPH, hypothyroidism, and prostate cancer, baseline A&Ox1, presenting from NH after difficulty swallowing for x4 weeks. Pt. denies difficulty swallowing during exam, and denies any chest pain, palpitations, headache, urinary discomfort abdominal pain, fever, chills. Only oriented to self, thinks he is in "[facility]", and said the year was "3000". Pt is not a good historian, was unable to get in contact with HCP Maria Ines Serrano. Was admitted in the past for ESBL E.Coli bacteremia in 4/2020, stay complicated by COVID infection, most recent ED trip s/p mechanical fall 10/2021.     ED vitals:154/78 BP, 17 RR, 74 HR, 98.1 F  Labs: WC 19.83, BMP wnl, UA pos nitrites, leuk esterase, 6-10 WBC, 25-50 RBCs   BCx and UCx pending  Barium swallow Xray pending  (28 Mar 2023 19:14)    # ATTEMPTED TO CALL HCP MARIA INES SERRANO MULTIPLE TIMES, WILL CONTINUE TO TRY CONTACT. AFTER EXTENSIVE SEARCH AND COLLABORATION WITH 5TH FLOOR PATIENT LIASON AND SW TEAM - CONTACT WAS MADE BY TEAM WITH RELATIVE MANA SERRANO @ [phone]     # SEPSIS S/T SUSPECTED UTI, HX OF ESBL ECOLI UTI  - S/P ABX [ CEFEPIME THEN ROCEPHIN], F/U BCX AND UCX - NGTD  - ID CONSULT    # DYSPHAGIA  - ON DIET PER ST EVAL  - IVF  - ASPIRATION PRECAUTIONS  - ST EVAL - SOFT AND BITE SIZED AND THIN LIQUIDS  - F/U BARIUM ESOPHAGRAM - MILDLY DILATED PROXIMAL AND MIDESOPHAGUS WITH MILD NARROWING OF THE DISTAL ESOPHAGUS WITH MILD IRREGULARITY OF ESOPHAGEAL WALL. MILD DELAY IN PASSAGE OF BARIUM THROUGH THE ESOPHAGUS WITH TERTIARY CONTRACTIONS AND INTRA-ESOPHAGEAL REFLUX.  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    ALLERGIES:  No Known Allergies    VITALS:    Vital Signs Last 24 Hrs  T(C): 36.7 (05 Apr 2023 05:57), Max: 37.1 (04 Apr 2023 13:33)  T(F): 98.1 (05 Apr 2023 05:57), Max: 98.7 (04 Apr 2023 13:33)  HR: 92 (05 Apr 2023 05:57) (92 - 101)  BP: 111/61 (05 Apr 2023 05:57) (111/61 - 144/72)  BP(mean): --  RR: 17 (05 Apr 2023 05:57) (17 - 18)  SpO2: 99% (05 Apr 2023 05:57) (96% - 99%)    Parameters below as of 05 Apr 2023 05:57  Patient On (Oxygen Delivery Method): room air        LABS:    CBC Full  -  ( 04 Apr 2023 06:40 )  WBC Count : 9.76 K/uL  RBC Count : 4.06 M/uL  Hemoglobin : 10.9 g/dL  Hematocrit : 35.1 %  Platelet Count - Automated : 400 K/uL  Mean Cell Volume : 86.5 fl  Mean Cell Hemoglobin : 26.8 pg  Mean Cell Hemoglobin Concentration : 31.1 gm/dL  Auto Neutrophil # : x  Auto Lymphocyte # : x  Auto Monocyte # : x  Auto Eosinophil # : x  Auto Basophil # : x  Auto Neutrophil % : x  Auto Lymphocyte % : x  Auto Monocyte % : x  Auto Eosinophil % : x  Auto Basophil % : x      04-04    137  |  107  |  6<L>  ----------------------------<  126<H>  3.7   |  27  |  0.94    Ca    9.2      04 Apr 2023 06:40  Phos  3.0     04-04  Mg     2.2     04-04      CAPILLARY BLOOD GLUCOSE              Creatinine Trend: 0.94<--, 0.75<--, 0.79<--, 0.76<--, 0.86<--, 0.92<--  I&O's Summary          .Urine  03-28 @ 14:55   No growth  --  --      .Blood  03-28 @ 13:45   No Growth Final  --  --          MEDICATIONS:    MEDICATIONS  (STANDING):  ascorbic acid 500 milliGRAM(s) Oral daily  aspirin  chewable 81 milliGRAM(s) Oral daily  dextrose 5% + sodium chloride 0.9%. 1000 milliLiter(s) (75 mL/Hr) IV Continuous <Continuous>  finasteride 5 milliGRAM(s) Oral daily  guaiFENesin Oral Liquid (Sugar-Free) 200 milliGRAM(s) Oral every 6 hours  heparin   Injectable 5000 Unit(s) SubCutaneous every 12 hours  latanoprost 0.005% Ophthalmic Solution 1 Drop(s) Both EYES at bedtime  levothyroxine 25 MICROGram(s) Oral daily  metoprolol succinate ER 25 milliGRAM(s) Oral daily  pantoprazole    Tablet 40 milliGRAM(s) Oral before breakfast  simvastatin 20 milliGRAM(s) Oral at bedtime  tamsulosin 0.4 milliGRAM(s) Oral at bedtime      MEDICATIONS  (PRN):      REVIEW OF SYSTEMS:                           ALL ROS DONE [ X   ]    CONSTITUTIONAL:  LETHARGIC [   ], FEVER [   ], UNRESPONSIVE [   ]  CVS:  CP  [   ], SOB, [   ], PALPITATIONS [   ], DIZZYNESS [   ]  RS: COUGH [   ], SPUTUM [   ]  GI: ABDOMINAL PAIN [   ], NAUSEA [   ], VOMITINGS [   ], DIARRHEA [   ], CONSTIPATION [   ]  :  DYSURIA [   ], NOCTURIA [   ], INCREASED FREQUENCY [   ], DRIBLING [   ],  SKELETAL: PAINFUL JOINTS [   ], SWOLLEN JOINTS [   ], NECK ACHE [   ], LOW BACK ACHE [   ],  SKIN : ULCERS [   ], RASH [   ], ITCHING [   ]  CNS: HEAD ACHE [   ], DOUBLE VISION [   ], BLURRED VISION [   ], AMS / CONFUSION [   ], SEIZURES [   ], WEAKNESS [   ],TINGLING / NUMBNESS [   ]    PHYSICAL EXAMINATION:  GENERAL APPEARANCE: NO DISTRESS  HEENT:  NO PALLOR, NO  JVD,  NO   NODES, NECK SUPPLE  CVS: S1 +, S2 +,   RS: AEEB,  OCCASIONAL  RALES +,   NO RONCHI  ABD: SOFT, NT, NO, BS +  EXT: NO PE  SKIN: WARM,   SKELETAL:  ROM ACCEPTABLE  CNS:  AAO X 1 ,   DEFICITS    RADIOLOGY :    RADIOLOGY AND READINGS REVIEWED      ASSESSMENT :     Urinary tract infection    No pertinent past medical history    Hypothyroidism    HTN (hypertension)    HLD (hyperlipidemia)    Glaucoma    GERD (gastroesophageal reflux disease)    BPH (benign prostatic hyperplasia)    Prostate cancer    No significant past surgical history        PLAN:  HPI:  87 year old male from [facility] with PMHx of hypertension, hyperlipidemia. glaucoma, GERD, BPH, hypothyroidism, and prostate cancer, baseline A&Ox1, presenting from NH after difficulty swallowing for x4 weeks. Pt. denies difficulty swallowing during exam, and denies any chest pain, palpitations, headache, urinary discomfort abdominal pain, fever, chills. Only oriented to self, thinks he is in "[facility]", and said the year was "3000". Pt is not a good historian, was unable to get in contact with HCP Maria Ines Serrano. Was admitted in the past for ESBL E.Coli bacteremia in 4/2020, stay complicated by COVID infection, most recent ED trip s/p mechanical fall 10/2021.     ED vitals:154/78 BP, 17 RR, 74 HR, 98.1 F  Labs: WC 19.83, BMP wnl, UA pos nitrites, leuk esterase, 6-10 WBC, 25-50 RBCs   BCx and UCx pending  Barium swallow Xray pending  (28 Mar 2023 19:14)    # ATTEMPTED TO CALL HCP MARIA INES SERRANO MULTIPLE TIMES, WILL CONTINUE TO TRY CONTACT. AFTER EXTENSIVE SEARCH AND COLLABORATION WITH 5TH FLOOR PATIENT LIASON AND SW TEAM. CONTACT WAS MADE BY TEAM WITH RELATIVE MANA SERRANO @ [phone] [ OF [place]] - HE IS AGREEABLE TO SERVE AS PATIENT'S HCP - CONVEYING THAT PATIENT DOES NOT HAVE ANY OTHER RELATIVES AT THIS TIME WHO CAN SERVE IN THIS ROLE.    # CASE DISCUSSED AT LENGTH WITH MANA QUINN @ [phone] [ OF MARIA INES SERRANO] - ALL QUESTIONS ANSWERED. HE IS AGREEABLE FOR PATIENT TO GO TO [facility]. D/C TO [facility].     # SEPSIS S/T SUSPECTED UTI, HX OF ESBL ECOLI UTI  - S/P ABX [ CEFEPIME THEN ROCEPHIN], F/U BCX AND UCX - NGTD  - ID CONSULT    # DYSPHAGIA  - ON DIET PER ST EVAL  - IVF  - ASPIRATION PRECAUTIONS  - ST EVAL - SOFT AND BITE SIZED AND THIN LIQUIDS  - F/U BARIUM ESOPHAGRAM - MILDLY DILATED PROXIMAL AND MIDESOPHAGUS WITH MILD NARROWING OF THE DISTAL ESOPHAGUS WITH MILD IRREGULARITY OF ESOPHAGEAL WALL. MILD DELAY IN PASSAGE OF BARIUM THROUGH THE ESOPHAGUS WITH TERTIARY CONTRACTIONS AND INTRA-ESOPHAGEAL REFLUX.  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

## 2023-04-05 NOTE — PROGRESS NOTE ADULT - PROBLEM SELECTOR PLAN 4
Continue statin
H/o HTN on Toprol   - BP stable   - C/w Toprol
- SBP controlled  - continue home dose Metop  - monitor BP
- SBP controlled  - continue home dose Metop  - monitor BP
H/o HTN on Toprol   - BP stable   - C/w Toprol

## 2023-04-06 ENCOUNTER — TRANSCRIPTION ENCOUNTER (OUTPATIENT)
Age: 88
End: 2023-04-06

## 2023-04-06 VITALS
RESPIRATION RATE: 18 BRPM | OXYGEN SATURATION: 99 % | SYSTOLIC BLOOD PRESSURE: 133 MMHG | TEMPERATURE: 99 F | DIASTOLIC BLOOD PRESSURE: 66 MMHG | HEART RATE: 99 BPM

## 2023-04-06 LAB — SARS-COV-2 RNA SPEC QL NAA+PROBE: SIGNIFICANT CHANGE UP

## 2023-04-06 PROCEDURE — 87040 BLOOD CULTURE FOR BACTERIA: CPT

## 2023-04-06 PROCEDURE — 99285 EMERGENCY DEPT VISIT HI MDM: CPT

## 2023-04-06 PROCEDURE — 83735 ASSAY OF MAGNESIUM: CPT

## 2023-04-06 PROCEDURE — 80048 BASIC METABOLIC PNL TOTAL CA: CPT

## 2023-04-06 PROCEDURE — 71045 X-RAY EXAM CHEST 1 VIEW: CPT

## 2023-04-06 PROCEDURE — 97161 PT EVAL LOW COMPLEX 20 MIN: CPT

## 2023-04-06 PROCEDURE — 96365 THER/PROPH/DIAG IV INF INIT: CPT

## 2023-04-06 PROCEDURE — 80053 COMPREHEN METABOLIC PANEL: CPT

## 2023-04-06 PROCEDURE — 85027 COMPLETE CBC AUTOMATED: CPT

## 2023-04-06 PROCEDURE — 84484 ASSAY OF TROPONIN QUANT: CPT

## 2023-04-06 PROCEDURE — 84443 ASSAY THYROID STIM HORMONE: CPT

## 2023-04-06 PROCEDURE — 85610 PROTHROMBIN TIME: CPT

## 2023-04-06 PROCEDURE — 0225U NFCT DS DNA&RNA 21 SARSCOV2: CPT

## 2023-04-06 PROCEDURE — 96372 THER/PROPH/DIAG INJ SC/IM: CPT | Mod: XU

## 2023-04-06 PROCEDURE — 74220 X-RAY XM ESOPHAGUS 1CNTRST: CPT

## 2023-04-06 PROCEDURE — 82009 KETONE BODYS QUAL: CPT

## 2023-04-06 PROCEDURE — 85730 THROMBOPLASTIN TIME PARTIAL: CPT

## 2023-04-06 PROCEDURE — 81001 URINALYSIS AUTO W/SCOPE: CPT

## 2023-04-06 PROCEDURE — 87086 URINE CULTURE/COLONY COUNT: CPT

## 2023-04-06 PROCEDURE — 97116 GAIT TRAINING THERAPY: CPT

## 2023-04-06 PROCEDURE — 82962 GLUCOSE BLOOD TEST: CPT

## 2023-04-06 PROCEDURE — 85045 AUTOMATED RETICULOCYTE COUNT: CPT

## 2023-04-06 PROCEDURE — 92610 EVALUATE SWALLOWING FUNCTION: CPT

## 2023-04-06 PROCEDURE — 36415 COLL VENOUS BLD VENIPUNCTURE: CPT

## 2023-04-06 PROCEDURE — 82550 ASSAY OF CK (CPK): CPT

## 2023-04-06 PROCEDURE — 97530 THERAPEUTIC ACTIVITIES: CPT

## 2023-04-06 PROCEDURE — 85025 COMPLETE CBC W/AUTO DIFF WBC: CPT

## 2023-04-06 PROCEDURE — 83880 ASSAY OF NATRIURETIC PEPTIDE: CPT

## 2023-04-06 PROCEDURE — 83690 ASSAY OF LIPASE: CPT

## 2023-04-06 PROCEDURE — 87635 SARS-COV-2 COVID-19 AMP PRB: CPT

## 2023-04-06 PROCEDURE — 83605 ASSAY OF LACTIC ACID: CPT

## 2023-04-06 PROCEDURE — 93005 ELECTROCARDIOGRAM TRACING: CPT

## 2023-04-06 PROCEDURE — 84100 ASSAY OF PHOSPHORUS: CPT

## 2023-04-06 RX ORDER — ENOXAPARIN SODIUM 100 MG/ML
40 INJECTION SUBCUTANEOUS EVERY 24 HOURS
Refills: 0 | Status: DISCONTINUED | OUTPATIENT
Start: 2023-04-06 | End: 2023-04-06

## 2023-04-06 RX ORDER — METOPROLOL TARTRATE 50 MG
1 TABLET ORAL
Qty: 0 | Refills: 0 | DISCHARGE
Start: 2023-04-06

## 2023-04-06 RX ORDER — ENOXAPARIN SODIUM 100 MG/ML
40 INJECTION SUBCUTANEOUS
Qty: 0 | Refills: 0 | DISCHARGE
Start: 2023-04-06

## 2023-04-06 RX ORDER — METOPROLOL TARTRATE 50 MG
1 TABLET ORAL
Refills: 0 | DISCHARGE

## 2023-04-06 RX ADMIN — Medication 25 MILLIGRAM(S): at 05:44

## 2023-04-06 RX ADMIN — HEPARIN SODIUM 5000 UNIT(S): 5000 INJECTION INTRAVENOUS; SUBCUTANEOUS at 05:44

## 2023-04-06 RX ADMIN — FINASTERIDE 5 MILLIGRAM(S): 5 TABLET, FILM COATED ORAL at 12:14

## 2023-04-06 RX ADMIN — Medication 81 MILLIGRAM(S): at 12:13

## 2023-04-06 RX ADMIN — Medication 25 MICROGRAM(S): at 05:45

## 2023-04-06 RX ADMIN — Medication 500 MILLIGRAM(S): at 12:14

## 2023-04-06 RX ADMIN — SODIUM CHLORIDE 75 MILLILITER(S): 9 INJECTION, SOLUTION INTRAVENOUS at 06:35

## 2023-04-06 RX ADMIN — PANTOPRAZOLE SODIUM 40 MILLIGRAM(S): 20 TABLET, DELAYED RELEASE ORAL at 05:44

## 2023-04-06 NOTE — PROGRESS NOTE ADULT - SUBJECTIVE AND OBJECTIVE BOX
Patient is a 89y old  Male who presents with a chief complaint of UTI, dysphagia (05 Apr 2023 12:01)    PATIENT IS SEEN AND EXAMINED IN MEDICAL FLOOR.  NGT [    ]    ROOT [   ]      GT [   ]    ALLERGIES:  No Known Allergies      Daily     Daily     VITALS:    Vital Signs Last 24 Hrs  T(C): 36.8 (06 Apr 2023 05:14), Max: 36.8 (06 Apr 2023 05:14)  T(F): 98.2 (06 Apr 2023 05:14), Max: 98.2 (06 Apr 2023 05:14)  HR: 78 (06 Apr 2023 05:14) (78 - 94)  BP: 112/82 (06 Apr 2023 05:14) (112/82 - 135/65)  BP(mean): --  RR: 17 (06 Apr 2023 05:14) (17 - 18)  SpO2: 98% (06 Apr 2023 05:14) (95% - 98%)    Parameters below as of 06 Apr 2023 05:14  Patient On (Oxygen Delivery Method): room air        LABS:              CAPILLARY BLOOD GLUCOSE              Creatinine Trend: 0.94<--, 0.75<--, 0.79<--, 0.76<--, 0.86<--, 0.92<--  I&O's Summary    05 Apr 2023 07:01  -  06 Apr 2023 07:00  --------------------------------------------------------  IN: 120 mL / OUT: 0 mL / NET: 120 mL            .Urine  03-28 @ 14:55   No growth  --  --      .Blood  03-28 @ 13:45   No Growth Final  --  --          MEDICATIONS:    MEDICATIONS  (STANDING):  ascorbic acid 500 milliGRAM(s) Oral daily  aspirin  chewable 81 milliGRAM(s) Oral daily  dextrose 5% + sodium chloride 0.9%. 1000 milliLiter(s) (75 mL/Hr) IV Continuous <Continuous>  finasteride 5 milliGRAM(s) Oral daily  guaiFENesin Oral Liquid (Sugar-Free) 200 milliGRAM(s) Oral every 6 hours  heparin   Injectable 5000 Unit(s) SubCutaneous every 12 hours  latanoprost 0.005% Ophthalmic Solution 1 Drop(s) Both EYES at bedtime  levothyroxine 25 MICROGram(s) Oral daily  metoprolol succinate ER 25 milliGRAM(s) Oral daily  pantoprazole    Tablet 40 milliGRAM(s) Oral before breakfast  simvastatin 20 milliGRAM(s) Oral at bedtime  tamsulosin 0.4 milliGRAM(s) Oral at bedtime      MEDICATIONS  (PRN):      REVIEW OF SYSTEMS:                           ALL ROS DONE [ X   ]    CONSTITUTIONAL:  LETHARGIC [   ], FEVER [   ], UNRESPONSIVE [   ]  CVS:  CP  [   ], SOB, [   ], PALPITATIONS [   ], DIZZYNESS [   ]  RS: COUGH [   ], SPUTUM [   ]  GI: ABDOMINAL PAIN [   ], NAUSEA [   ], VOMITINGS [   ], DIARRHEA [   ], CONSTIPATION [   ]  :  DYSURIA [   ], NOCTURIA [   ], INCREASED FREQUENCY [   ], DRIBLING [   ],  SKELETAL: PAINFUL JOINTS [   ], SWOLLEN JOINTS [   ], NECK ACHE [   ], LOW BACK ACHE [   ],  SKIN : ULCERS [   ], RASH [   ], ITCHING [   ]  CNS: HEAD ACHE [   ], DOUBLE VISION [   ], BLURRED VISION [   ], AMS / CONFUSION [   ], SEIZURES [   ], WEAKNESS [   ],TINGLING / NUMBNESS [   ]      PHYSICAL EXAMINATION:  GENERAL APPEARANCE: NO DISTRESS  HEENT:  NO PALLOR, NO  JVD,  NO   NODES, NECK SUPPLE  CVS: S1 +, S2 +,   RS: AEEB,  OCCASIONAL  RALES +,   NO RONCHI  ABD: SOFT, NT, NO, BS +  EXT: NO PE  SKIN: WARM,   SKELETAL:  ROM ACCEPTABLE  CNS:  AAO X 1 ,   DEFICITS    RADIOLOGY :    RADIOLOGY AND READINGS REVIEWED      ASSESSMENT :     Urinary tract infection    No pertinent past medical history    Hypothyroidism    HTN (hypertension)    HLD (hyperlipidemia)    Glaucoma    GERD (gastroesophageal reflux disease)    BPH (benign prostatic hyperplasia)    Prostate cancer    No significant past surgical history        PLAN:  HPI:  87 year old male from [facility] with PMHx of hypertension, hyperlipidemia. glaucoma, GERD, BPH, hypothyroidism, and prostate cancer, baseline A&Ox1, presenting from NH after difficulty swallowing for x4 weeks. Pt. denies difficulty swallowing during exam, and denies any chest pain, palpitations, headache, urinary discomfort abdominal pain, fever, chills. Only oriented to self, thinks he is in "[facility]", and said the year was "3000". Pt is not a good historian, was unable to get in contact with HCP Maria Ines Serrano. Was admitted in the past for ESBL E.Coli bacteremia in 4/2020, stay complicated by COVID infection, most recent ED trip s/p mechanical fall 10/2021.     ED vitals:154/78 BP, 17 RR, 74 HR, 98.1 F  Labs: WC 19.83, BMP wnl, UA pos nitrites, leuk esterase, 6-10 WBC, 25-50 RBCs   BCx and UCx pending  Barium swallow Xray pending  (28 Mar 2023 19:14)    # ATTEMPTED TO CALL HCP MARIA INES SERRANO MULTIPLE TIMES, WILL CONTINUE TO TRY CONTACT. AFTER EXTENSIVE SEARCH AND COLLABORATION WITH 5TH FLOOR PATIENT LIASON AND SW TEAM. CONTACT WAS MADE BY TEAM WITH RELATIVE MANA SERRANO @ [phone] [ OF [place]] - HE IS AGREEABLE TO SERVE AS PATIENT'S HCP - CONVEYING THAT PATIENT DOES NOT HAVE ANY OTHER RELATIVES AT THIS TIME WHO CAN SERVE IN THIS ROLE.    # CASE DISCUSSED AT LENGTH WITH MANA LAZAROONEY @ [phone] [ OF [facility]] - ALL QUESTIONS ANSWERED. HE IS AGREEABLE FOR PATIENT TO GO TO [place]. D/C TO [facility].     # SEPSIS S/T SUSPECTED UTI, HX OF ESBL ECOLI UTI  - S/P ABX [ CEFEPIME THEN ROCEPHIN], F/U BCX AND UCX - NGTD  - ID CONSULT    # DYSPHAGIA  - ON DIET PER ST EVAL  - IVF  - ASPIRATION PRECAUTIONS  - ST EVAL - SOFT AND BITE SIZED AND THIN LIQUIDS  - F/U BARIUM ESOPHAGRAM - MILDLY DILATED PROXIMAL AND MIDESOPHAGUS WITH MILD NARROWING OF THE DISTAL ESOPHAGUS WITH MILD IRREGULARITY OF ESOPHAGEAL WALL. MILD DELAY IN PASSAGE OF BARIUM THROUGH THE ESOPHAGUS WITH TERTIARY CONTRACTIONS AND INTRA-ESOPHAGEAL REFLUX.  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

## 2023-04-06 NOTE — DISCHARGE NOTE NURSING/CASE MANAGEMENT/SOCIAL WORK - PATIENT PORTAL LINK FT
You can access the FollowMyHealth Patient Portal offered by [facility] by registering at the following website: [email or web address]. By joining [facility]’s FollowMyHealth portal, you will also be able to view your health information using other applications (apps) compatible with our system.

## 2023-04-06 NOTE — DISCHARGE NOTE NURSING/CASE MANAGEMENT/SOCIAL WORK - NSDCVIVACCINE_GEN_ALL_CORE_FT
Td (adult) preservative free; 20-Oct-2021 19:29; Rosalva Gee (RN); [employer]; [id number] (Exp. Date: 09-Sep-2023); IntraMuscular; Deltoid Left.; 0.5 milliLiter(s); VIS (VIS Published: 20-Oct-2021, VIS Presented: 20-Oct-2021);

## 2023-04-06 NOTE — PROGRESS NOTE ADULT - PROVIDER SPECIALTY LIST ADULT
Gastroenterology
Internal Medicine
Infectious Disease
Internal Medicine

## 2023-04-06 NOTE — DISCHARGE NOTE NURSING/CASE MANAGEMENT/SOCIAL WORK - NSDCPEFALRISK_GEN_ALL_CORE
For information on Fall & Injury Prevention, visit: [email or web address] OR  [email or web address] OR  https://www.cdc.gov/steadi/patient.html

## 2023-05-15 ENCOUNTER — INPATIENT (INPATIENT)
Facility: HOSPITAL | Age: 88
LOS: 4 days | Discharge: NURSING, REHAB OR LONG-TERM CARE | DRG: 871 | End: 2023-05-20
Attending: INTERNAL MEDICINE | Admitting: INTERNAL MEDICINE
Payer: MEDICARE

## 2023-05-15 VITALS
HEIGHT: 60 IN | RESPIRATION RATE: 14 BRPM | TEMPERATURE: 98 F | HEART RATE: 104 BPM | DIASTOLIC BLOOD PRESSURE: 55 MMHG | WEIGHT: 169.98 LBS | OXYGEN SATURATION: 97 % | SYSTOLIC BLOOD PRESSURE: 82 MMHG

## 2023-05-15 LAB
ALBUMIN SERPL ELPH-MCNC: 1.4 G/DL — LOW (ref 3.5–5)
ALP SERPL-CCNC: 95 U/L — SIGNIFICANT CHANGE UP (ref 40–120)
ALT FLD-CCNC: 13 U/L DA — SIGNIFICANT CHANGE UP (ref 10–60)
ANION GAP SERPL CALC-SCNC: 6 MMOL/L — SIGNIFICANT CHANGE UP (ref 5–17)
APTT BLD: 37 SEC — HIGH (ref 27.5–35.5)
AST SERPL-CCNC: 8 U/L — LOW (ref 10–40)
BASOPHILS # BLD AUTO: 0.03 K/UL — SIGNIFICANT CHANGE UP (ref 0–0.2)
BASOPHILS NFR BLD AUTO: 0.2 % — SIGNIFICANT CHANGE UP (ref 0–2)
BILIRUB SERPL-MCNC: 0.4 MG/DL — SIGNIFICANT CHANGE UP (ref 0.2–1.2)
BUN SERPL-MCNC: 11 MG/DL — SIGNIFICANT CHANGE UP (ref 7–18)
CALCIUM SERPL-MCNC: 8.3 MG/DL — LOW (ref 8.4–10.5)
CHLORIDE SERPL-SCNC: 108 MMOL/L — SIGNIFICANT CHANGE UP (ref 96–108)
CO2 SERPL-SCNC: 25 MMOL/L — SIGNIFICANT CHANGE UP (ref 22–31)
CREAT SERPL-MCNC: 0.93 MG/DL — SIGNIFICANT CHANGE UP (ref 0.5–1.3)
EGFR: 78 ML/MIN/1.73M2 — SIGNIFICANT CHANGE UP
EOSINOPHIL # BLD AUTO: 0.03 K/UL — SIGNIFICANT CHANGE UP (ref 0–0.5)
EOSINOPHIL NFR BLD AUTO: 0.2 % — SIGNIFICANT CHANGE UP (ref 0–6)
GLUCOSE SERPL-MCNC: 131 MG/DL — HIGH (ref 70–99)
HCT VFR BLD CALC: 32.1 % — LOW (ref 39–50)
HGB BLD-MCNC: 9.7 G/DL — LOW (ref 13–17)
IMM GRANULOCYTES NFR BLD AUTO: 1.1 % — HIGH (ref 0–0.9)
INR BLD: 1.43 RATIO — HIGH (ref 0.88–1.16)
LACTATE SERPL-SCNC: 3 MMOL/L — HIGH (ref 0.7–2)
LIDOCAIN IGE QN: 62 U/L — LOW (ref 73–393)
LYMPHOCYTES # BLD AUTO: 1.01 K/UL — SIGNIFICANT CHANGE UP (ref 1–3.3)
LYMPHOCYTES # BLD AUTO: 7.6 % — LOW (ref 13–44)
MCHC RBC-ENTMCNC: 25.1 PG — LOW (ref 27–34)
MCHC RBC-ENTMCNC: 30.2 GM/DL — LOW (ref 32–36)
MCV RBC AUTO: 83.2 FL — SIGNIFICANT CHANGE UP (ref 80–100)
MONOCYTES # BLD AUTO: 0.55 K/UL — SIGNIFICANT CHANGE UP (ref 0–0.9)
MONOCYTES NFR BLD AUTO: 4.1 % — SIGNIFICANT CHANGE UP (ref 2–14)
NEUTROPHILS # BLD AUTO: 11.58 K/UL — HIGH (ref 1.8–7.4)
NEUTROPHILS NFR BLD AUTO: 86.8 % — HIGH (ref 43–77)
NRBC # BLD: 0 /100 WBCS — SIGNIFICANT CHANGE UP (ref 0–0)
PLATELET # BLD AUTO: 415 K/UL — HIGH (ref 150–400)
POTASSIUM SERPL-MCNC: 3.4 MMOL/L — LOW (ref 3.5–5.3)
POTASSIUM SERPL-SCNC: 3.4 MMOL/L — LOW (ref 3.5–5.3)
PROT SERPL-MCNC: 6.1 G/DL — SIGNIFICANT CHANGE UP (ref 6–8.3)
PROTHROM AB SERPL-ACNC: 17.1 SEC — HIGH (ref 10.5–13.4)
RBC # BLD: 3.86 M/UL — LOW (ref 4.2–5.8)
RBC # FLD: 16.8 % — HIGH (ref 10.3–14.5)
SODIUM SERPL-SCNC: 139 MMOL/L — SIGNIFICANT CHANGE UP (ref 135–145)
TROPONIN I, HIGH SENSITIVITY RESULT: 14 NG/L — SIGNIFICANT CHANGE UP
WBC # BLD: 13.35 K/UL — HIGH (ref 3.8–10.5)
WBC # FLD AUTO: 13.35 K/UL — HIGH (ref 3.8–10.5)

## 2023-05-15 PROCEDURE — 99291 CRITICAL CARE FIRST HOUR: CPT

## 2023-05-15 PROCEDURE — 93010 ELECTROCARDIOGRAM REPORT: CPT

## 2023-05-15 RX ORDER — DESMOPRESSIN ACETATE 0.1 MG/1
31 TABLET ORAL ONCE
Refills: 0 | Status: COMPLETED | OUTPATIENT
Start: 2023-05-15 | End: 2023-05-15

## 2023-05-15 RX ORDER — PANTOPRAZOLE SODIUM 20 MG/1
8 TABLET, DELAYED RELEASE ORAL
Qty: 80 | Refills: 0 | Status: DISCONTINUED | OUTPATIENT
Start: 2023-05-15 | End: 2023-05-16

## 2023-05-15 RX ORDER — SODIUM CHLORIDE 9 MG/ML
1000 INJECTION INTRAMUSCULAR; INTRAVENOUS; SUBCUTANEOUS ONCE
Refills: 0 | Status: COMPLETED | OUTPATIENT
Start: 2023-05-15 | End: 2023-05-15

## 2023-05-15 RX ADMIN — SODIUM CHLORIDE 1000 MILLILITER(S): 9 INJECTION INTRAMUSCULAR; INTRAVENOUS; SUBCUTANEOUS at 22:36

## 2023-05-15 NOTE — ED PROVIDER NOTE - OBJECTIVE STATEMENT
89-year-old male transfer from [facility] as per nursing home notes patient with moderate amount of rectal bleeding and PMD Dr. Tomas notified for transfer to hospital for rectal bleeding and rule out sepsis.  Initial triage blood pressure was 84/55 and heart rate of 104.  On my evaluation at bedside repeat blood pressure is 124/74 and heart rate 95, pulse ox 99% room air.  Patient is weak appearing but able to state his name is Jarrod and t "I am okay".  Meds: aspirin, finasteride, latanoprost, Synthroid, Protonix, Flomax, metoprolol, Zosyn,.

## 2023-05-15 NOTE — ED ADULT NURSE NOTE - NSFALLRISKASMT_ED_ALL_ED_DT
Telephone Encounter by Rowan Mendosa CMA at 09/05/18 12:58 PM     Author:  Rowan Mendosa CMA Service:  (none) Author Type:  Certified Medical Assistant     Filed:  09/05/18 01:00 PM Encounter Date:  9/4/2018 Status:  Signed     :  Rowan Mendosa CMA (Certified Medical Assistant)            To Dr. Trivedi -[FL1.1T] please advise on refill, patient has pending appointment 9-14-18.  Last refilled 6-1-18, last seen 12-13-17.[FL1.1M]      Revision History        User Key Date/Time User Provider Type Action    > FL1.1 09/05/18 01:00 PM Rowan Mendosa CMA Certified Medical Assistant Sign    M - Manual, T - Template             15-May-2023 22:15

## 2023-05-15 NOTE — ED PROVIDER NOTE - CLINICAL SUMMARY MEDICAL DECISION MAKING FREE TEXT BOX
89-year-old male transfer from nursing home for evaluation of GI bleed.  H&H is now 9.7/32.1 which is down from 10.9/35.1 on April 4, 2023.  Patient is on aspirin, DDAVP ordered, CTA abdomen also ordered.  Patient otherwise in no acute distress. 89-year-old male transfer from nursing home for evaluation of GI bleed.  H&H is now 9.7/32.1 which is down from 10.9/35.1 on April 4, 2023.  Patient is on aspirin, DDAVP ordered, CTA abdomen also ordered.  Patient otherwise in no acute distress.    MAR and Dr. Tomas endorsed. Pt agrees with admission.   I had a detailed discussion with the patient and/or guardian regarding the historical points, exam findings, and any diagnostic results supporting the admit diagnosis. 89-year-old male transfer from nursing home for evaluation of GI bleed.  H&H is now 9.7/32.1 which is down from 10.9/35.1 on April 4, 2023.  Patient is on aspirin, DDAVP ordered, CTA abdomen also ordered.  Patient otherwise in no acute distress.    MAR and Dr. Tomas endorsed. Pt agrees with admission.   I had a detailed discussion with the patient and/or guardian regarding the historical points, exam findings, and any diagnostic results supporting the admit diagnosis.    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  Zosyn IV ordered.  Surgical house officer endorsed. MAR aware.

## 2023-05-15 NOTE — ED ADULT NURSE NOTE - NSFALLHARMRISKINTERV_ED_ALL_ED
Assistance OOB with selected safe patient handling equipment if applicable/Assistance with ambulation/Communicate risk of Fall with Harm to all staff, patient, and family/Monitor gait and stability/Monitor for mental status changes and reorient to person, place, and time, as needed/Provide visual cue: red socks, yellow wristband, yellow gown, etc/Reinforce activity limits and safety measures with patient and family/Toileting schedule using arm’s reach rule for commode and bathroom/Use of alarms - bed, stretcher, chair and/or video monitoring/Bed in lowest position, wheels locked, appropriate side rails in place/Call bell, personal items and telephone in reach/Instruct patient to call for assistance before getting out of bed/chair/stretcher/Non-slip footwear applied when patient is off stretcher/[place] to call system/Physically safe environment - no spills, clutter or unnecessary equipment/Purposeful Proactive Rounding/Room/bathroom lighting operational, light cord in reach

## 2023-05-15 NOTE — ED ADULT NURSE NOTE - OBJECTIVE STATEMENT
Pt presents to ED for rectal bleeding. Upon arrival low bp noted. Pt is placed on cardiac monitoring with continuous pulse oxymetry. NSR noted. Pt presents to ED for rectal bleeding. Upon arrival low bp noted. Pt is placed on cardiac monitoring with continuous pulse oxymetry. NSR noted. Stage 3 pressure noted to sacrum. Allevyn applied.

## 2023-05-15 NOTE — ED PROVIDER NOTE - PHYSICAL EXAMINATION
Thin weak appearing, no respiratory distress  Oral membranes dry  CVS regular rate and rhythm S1-S2  Lungs bilateral airway entry  Abdomen soft, no guarding to palpation  Extremities thin/atrophic  Neuro slow to speak oriented to self, slow purposeful motor activity

## 2023-05-16 DIAGNOSIS — E78.5 HYPERLIPIDEMIA, UNSPECIFIED: ICD-10-CM

## 2023-05-16 DIAGNOSIS — Z29.9 ENCOUNTER FOR PROPHYLACTIC MEASURES, UNSPECIFIED: ICD-10-CM

## 2023-05-16 DIAGNOSIS — E87.6 HYPOKALEMIA: ICD-10-CM

## 2023-05-16 DIAGNOSIS — E03.9 HYPOTHYROIDISM, UNSPECIFIED: ICD-10-CM

## 2023-05-16 DIAGNOSIS — K92.2 GASTROINTESTINAL HEMORRHAGE, UNSPECIFIED: ICD-10-CM

## 2023-05-16 DIAGNOSIS — I10 ESSENTIAL (PRIMARY) HYPERTENSION: ICD-10-CM

## 2023-05-16 DIAGNOSIS — K62.5 HEMORRHAGE OF ANUS AND RECTUM: ICD-10-CM

## 2023-05-16 DIAGNOSIS — R65.10 SYSTEMIC INFLAMMATORY RESPONSE SYNDROME (SIRS) OF NON-INFECTIOUS ORIGIN WITHOUT ACUTE ORGAN DYSFUNCTION: ICD-10-CM

## 2023-05-16 DIAGNOSIS — R13.10 DYSPHAGIA, UNSPECIFIED: ICD-10-CM

## 2023-05-16 LAB
ALBUMIN SERPL ELPH-MCNC: 1.2 G/DL — LOW (ref 3.5–5)
ALP SERPL-CCNC: 82 U/L — SIGNIFICANT CHANGE UP (ref 40–120)
ALT FLD-CCNC: 11 U/L DA — SIGNIFICANT CHANGE UP (ref 10–60)
ANION GAP SERPL CALC-SCNC: 7 MMOL/L — SIGNIFICANT CHANGE UP (ref 5–17)
APPEARANCE UR: ABNORMAL
AST SERPL-CCNC: 5 U/L — LOW (ref 10–40)
BILIRUB SERPL-MCNC: 0.5 MG/DL — SIGNIFICANT CHANGE UP (ref 0.2–1.2)
BILIRUB UR-MCNC: ABNORMAL
BUN SERPL-MCNC: 9 MG/DL — SIGNIFICANT CHANGE UP (ref 7–18)
CALCIUM SERPL-MCNC: 8.2 MG/DL — LOW (ref 8.4–10.5)
CHLORIDE SERPL-SCNC: 111 MMOL/L — HIGH (ref 96–108)
CO2 SERPL-SCNC: 22 MMOL/L — SIGNIFICANT CHANGE UP (ref 22–31)
COLOR SPEC: ABNORMAL
CREAT SERPL-MCNC: 0.82 MG/DL — SIGNIFICANT CHANGE UP (ref 0.5–1.3)
DIFF PNL FLD: ABNORMAL
EGFR: 83 ML/MIN/1.73M2 — SIGNIFICANT CHANGE UP
GLUCOSE BLDC GLUCOMTR-MCNC: 94 MG/DL — SIGNIFICANT CHANGE UP (ref 70–99)
GLUCOSE SERPL-MCNC: 145 MG/DL — HIGH (ref 70–99)
GLUCOSE UR QL: NEGATIVE — SIGNIFICANT CHANGE UP
HCT VFR BLD CALC: 29.5 % — LOW (ref 39–50)
HGB BLD-MCNC: 8.7 G/DL — LOW (ref 13–17)
KETONES UR-MCNC: NEGATIVE — SIGNIFICANT CHANGE UP
LACTATE SERPL-SCNC: 2.1 MMOL/L — HIGH (ref 0.7–2)
LEUKOCYTE ESTERASE UR-ACNC: ABNORMAL
MAGNESIUM SERPL-MCNC: 6.4 MG/DL — HIGH (ref 1.6–2.6)
MCHC RBC-ENTMCNC: 25 PG — LOW (ref 27–34)
MCHC RBC-ENTMCNC: 29.5 GM/DL — LOW (ref 32–36)
MCV RBC AUTO: 84.8 FL — SIGNIFICANT CHANGE UP (ref 80–100)
NITRITE UR-MCNC: NEGATIVE — SIGNIFICANT CHANGE UP
NRBC # BLD: 0 /100 WBCS — SIGNIFICANT CHANGE UP (ref 0–0)
PH UR: 7 — SIGNIFICANT CHANGE UP (ref 5–8)
PHOSPHATE SERPL-MCNC: 2.3 MG/DL — LOW (ref 2.5–4.5)
PLATELET # BLD AUTO: 375 K/UL — SIGNIFICANT CHANGE UP (ref 150–400)
POTASSIUM SERPL-MCNC: 3.2 MMOL/L — LOW (ref 3.5–5.3)
POTASSIUM SERPL-SCNC: 3.2 MMOL/L — LOW (ref 3.5–5.3)
PROT SERPL-MCNC: 5.3 G/DL — LOW (ref 6–8.3)
PROT UR-MCNC: 100 MG/DL
RBC # BLD: 3.48 M/UL — LOW (ref 4.2–5.8)
RBC # FLD: 16.9 % — HIGH (ref 10.3–14.5)
SODIUM SERPL-SCNC: 140 MMOL/L — SIGNIFICANT CHANGE UP (ref 135–145)
SP GR SPEC: 1.01 — SIGNIFICANT CHANGE UP (ref 1.01–1.02)
UROBILINOGEN FLD QL: 8 MG/DL
WBC # BLD: 12.75 K/UL — HIGH (ref 3.8–10.5)
WBC # FLD AUTO: 12.75 K/UL — HIGH (ref 3.8–10.5)

## 2023-05-16 PROCEDURE — 99222 1ST HOSP IP/OBS MODERATE 55: CPT

## 2023-05-16 PROCEDURE — 71045 X-RAY EXAM CHEST 1 VIEW: CPT | Mod: 26

## 2023-05-16 PROCEDURE — 74174 CTA ABD&PLVS W/CONTRAST: CPT | Mod: 26,MA

## 2023-05-16 RX ORDER — FINASTERIDE 5 MG/1
1 TABLET, FILM COATED ORAL
Qty: 0 | Refills: 0 | DISCHARGE

## 2023-05-16 RX ORDER — POTASSIUM PHOSPHATE, MONOBASIC POTASSIUM PHOSPHATE, DIBASIC 236; 224 MG/ML; MG/ML
15 INJECTION, SOLUTION INTRAVENOUS ONCE
Refills: 0 | Status: COMPLETED | OUTPATIENT
Start: 2023-05-16 | End: 2023-05-16

## 2023-05-16 RX ORDER — TAMSULOSIN HYDROCHLORIDE 0.4 MG/1
1 CAPSULE ORAL
Qty: 0 | Refills: 0 | DISCHARGE

## 2023-05-16 RX ORDER — ASPIRIN/CALCIUM CARB/MAGNESIUM 324 MG
1 TABLET ORAL
Refills: 0 | DISCHARGE

## 2023-05-16 RX ORDER — ASCORBIC ACID 60 MG
1 TABLET,CHEWABLE ORAL
Refills: 0 | DISCHARGE

## 2023-05-16 RX ORDER — POTASSIUM CHLORIDE 20 MEQ
10 PACKET (EA) ORAL
Refills: 0 | Status: COMPLETED | OUTPATIENT
Start: 2023-05-16 | End: 2023-05-16

## 2023-05-16 RX ORDER — SIMVASTATIN 20 MG/1
20 TABLET, FILM COATED ORAL AT BEDTIME
Refills: 0 | Status: DISCONTINUED | OUTPATIENT
Start: 2023-05-16 | End: 2023-05-20

## 2023-05-16 RX ORDER — LEVOTHYROXINE SODIUM 125 MCG
1 TABLET ORAL
Qty: 0 | Refills: 0 | DISCHARGE

## 2023-05-16 RX ORDER — PANTOPRAZOLE SODIUM 20 MG/1
1 TABLET, DELAYED RELEASE ORAL
Refills: 0 | DISCHARGE

## 2023-05-16 RX ORDER — SODIUM CHLORIDE 9 MG/ML
1000 INJECTION INTRAMUSCULAR; INTRAVENOUS; SUBCUTANEOUS
Refills: 0 | Status: DISCONTINUED | OUTPATIENT
Start: 2023-05-16 | End: 2023-05-20

## 2023-05-16 RX ORDER — LATANOPROST 0.05 MG/ML
1 SOLUTION/ DROPS OPHTHALMIC; TOPICAL
Refills: 0 | DISCHARGE

## 2023-05-16 RX ORDER — ASCORBIC ACID 60 MG
500 TABLET,CHEWABLE ORAL DAILY
Refills: 0 | Status: DISCONTINUED | OUTPATIENT
Start: 2023-05-16 | End: 2023-05-20

## 2023-05-16 RX ORDER — PANTOPRAZOLE SODIUM 20 MG/1
40 TABLET, DELAYED RELEASE ORAL
Refills: 0 | Status: DISCONTINUED | OUTPATIENT
Start: 2023-05-16 | End: 2023-05-20

## 2023-05-16 RX ORDER — FINASTERIDE 5 MG/1
5 TABLET, FILM COATED ORAL DAILY
Refills: 0 | Status: DISCONTINUED | OUTPATIENT
Start: 2023-05-16 | End: 2023-05-20

## 2023-05-16 RX ORDER — CEFTRIAXONE 500 MG/1
1000 INJECTION, POWDER, FOR SOLUTION INTRAMUSCULAR; INTRAVENOUS EVERY 24 HOURS
Refills: 0 | Status: DISCONTINUED | OUTPATIENT
Start: 2023-05-16 | End: 2023-05-16

## 2023-05-16 RX ORDER — SIMVASTATIN 20 MG/1
1 TABLET, FILM COATED ORAL
Qty: 0 | Refills: 0 | DISCHARGE

## 2023-05-16 RX ORDER — TAMSULOSIN HYDROCHLORIDE 0.4 MG/1
0.4 CAPSULE ORAL AT BEDTIME
Refills: 0 | Status: DISCONTINUED | OUTPATIENT
Start: 2023-05-16 | End: 2023-05-20

## 2023-05-16 RX ORDER — LEVOTHYROXINE SODIUM 125 MCG
25 TABLET ORAL DAILY
Refills: 0 | Status: DISCONTINUED | OUTPATIENT
Start: 2023-05-16 | End: 2023-05-20

## 2023-05-16 RX ORDER — SODIUM CHLORIDE 9 MG/ML
250 INJECTION INTRAMUSCULAR; INTRAVENOUS; SUBCUTANEOUS ONCE
Refills: 0 | Status: COMPLETED | OUTPATIENT
Start: 2023-05-16 | End: 2023-05-16

## 2023-05-16 RX ORDER — PIPERACILLIN AND TAZOBACTAM 4; .5 G/20ML; G/20ML
3.38 INJECTION, POWDER, LYOPHILIZED, FOR SOLUTION INTRAVENOUS EVERY 8 HOURS
Refills: 0 | Status: DISCONTINUED | OUTPATIENT
Start: 2023-05-16 | End: 2023-05-19

## 2023-05-16 RX ADMIN — Medication 500 MILLIGRAM(S): at 11:51

## 2023-05-16 RX ADMIN — SODIUM CHLORIDE 100 MILLILITER(S): 9 INJECTION INTRAMUSCULAR; INTRAVENOUS; SUBCUTANEOUS at 08:58

## 2023-05-16 RX ADMIN — PANTOPRAZOLE SODIUM 40 MILLIGRAM(S): 20 TABLET, DELAYED RELEASE ORAL at 06:13

## 2023-05-16 RX ADMIN — POTASSIUM PHOSPHATE, MONOBASIC POTASSIUM PHOSPHATE, DIBASIC 62.5 MILLIMOLE(S): 236; 224 INJECTION, SOLUTION INTRAVENOUS at 13:48

## 2023-05-16 RX ADMIN — PIPERACILLIN AND TAZOBACTAM 25 GRAM(S): 4; .5 INJECTION, POWDER, LYOPHILIZED, FOR SOLUTION INTRAVENOUS at 13:57

## 2023-05-16 RX ADMIN — TAMSULOSIN HYDROCHLORIDE 0.4 MILLIGRAM(S): 0.4 CAPSULE ORAL at 22:35

## 2023-05-16 RX ADMIN — SIMVASTATIN 20 MILLIGRAM(S): 20 TABLET, FILM COATED ORAL at 22:35

## 2023-05-16 RX ADMIN — Medication 100 MILLIEQUIVALENT(S): at 09:53

## 2023-05-16 RX ADMIN — PANTOPRAZOLE SODIUM 10 MG/HR: 20 TABLET, DELAYED RELEASE ORAL at 00:24

## 2023-05-16 RX ADMIN — PIPERACILLIN AND TAZOBACTAM 25 GRAM(S): 4; .5 INJECTION, POWDER, LYOPHILIZED, FOR SOLUTION INTRAVENOUS at 04:49

## 2023-05-16 RX ADMIN — PANTOPRAZOLE SODIUM 40 MILLIGRAM(S): 20 TABLET, DELAYED RELEASE ORAL at 17:49

## 2023-05-16 RX ADMIN — PIPERACILLIN AND TAZOBACTAM 25 GRAM(S): 4; .5 INJECTION, POWDER, LYOPHILIZED, FOR SOLUTION INTRAVENOUS at 22:35

## 2023-05-16 RX ADMIN — SODIUM CHLORIDE 100 MILLILITER(S): 9 INJECTION INTRAMUSCULAR; INTRAVENOUS; SUBCUTANEOUS at 22:36

## 2023-05-16 RX ADMIN — Medication 25 MICROGRAM(S): at 06:13

## 2023-05-16 RX ADMIN — DESMOPRESSIN ACETATE 231 MICROGRAM(S): 0.1 TABLET ORAL at 04:49

## 2023-05-16 RX ADMIN — SODIUM CHLORIDE 500 MILLILITER(S): 9 INJECTION INTRAMUSCULAR; INTRAVENOUS; SUBCUTANEOUS at 08:57

## 2023-05-16 RX ADMIN — Medication 100 MILLIEQUIVALENT(S): at 11:50

## 2023-05-16 RX ADMIN — Medication 100 MILLIEQUIVALENT(S): at 08:57

## 2023-05-16 RX ADMIN — SODIUM CHLORIDE 100 MILLILITER(S): 9 INJECTION INTRAMUSCULAR; INTRAVENOUS; SUBCUTANEOUS at 04:50

## 2023-05-16 RX ADMIN — FINASTERIDE 5 MILLIGRAM(S): 5 TABLET, FILM COATED ORAL at 11:51

## 2023-05-16 NOTE — PATIENT PROFILE ADULT - FALL HARM RISK - HARM RISK INTERVENTIONS
Assistance with ambulation/Assistance OOB with selected safe patient handling equipment/Communicate Risk of Fall with Harm to all staff/Discuss with provider need for PT consult/Monitor gait and stability/Reinforce activity limits and safety measures with patient and family/Tailored Fall Risk Interventions/Visual Cue: Yellow wristband and red socks/Bed in lowest position, wheels locked, appropriate side rails in place/Call bell, personal items and telephone in reach/Instruct patient to call for assistance before getting out of bed or chair/Non-slip footwear when patient is out of bed/[place] to call system/Physically safe environment - no spills, clutter or unnecessary equipment/Purposeful Proactive Rounding/Room/bathroom lighting operational, light cord in reach/Unable to comprehend

## 2023-05-16 NOTE — H&P ADULT - PROBLEM SELECTOR PLAN 1
- Patient brought to ED due to rectal bleeding in NH  - No more episodes since admission  - Hb 9.5; Baseline 13  - Will start on PPI BID   - Hold aspirin  - IV fluids 0.9% NS maintainance fluids  - F/u CT abdomen and pelvis IV contrast   - CBC q6  - GI consult for possible colonoscope

## 2023-05-16 NOTE — CONSULT NOTE ADULT - SUBJECTIVE AND OBJECTIVE BOX
HPI:  Patient is 89 years old male from [facility] with past medical history of hypertension, hyperlipidemia. glaucoma, GERD, BPH, hypothyroidism, and prostate cancer, baseline A&Ox1 who was transferred due to rectal bleeding. His BP was around 85/50. Patient also has elevated WBCs and elevated lactate. Patient was admitted in April with suspected bacteremia and dysphagia for which he had esophagogram that showed Mildly dilated proximal and midesophagus with mild narrowing of the distal esophagus with mild irregularity of esophageal wall. In ED, Patient received 1L IV bolus of NS. DDAVPx1 and started on PPI drip. Patient doesn't seem to have chest pain, abdominal pain or vomiting or shortness of breath.    REVIEW OF SYSTEMS:  Limited due to mental status      (16 May 2023 01:40)      PAST MEDICAL & SURGICAL HISTORY:  Hypothyroidism      HTN (hypertension)      HLD (hyperlipidemia)      Glaucoma      GERD (gastroesophageal reflux disease)      BPH (benign prostatic hyperplasia)      Prostate cancer      No significant past surgical history          No Known Allergies      Meds:  ascorbic acid 500 milliGRAM(s) Oral daily  finasteride 5 milliGRAM(s) Oral daily  levothyroxine 25 MICROGram(s) Oral daily  pantoprazole  Injectable 40 milliGRAM(s) IV Push two times a day  piperacillin/tazobactam IVPB.. 3.375 Gram(s) IV Intermittent every 8 hours  simvastatin 20 milliGRAM(s) Oral at bedtime  sodium chloride 0.9%. 1000 milliLiter(s) IV Continuous <Continuous>  tamsulosin 0.4 milliGRAM(s) Oral at bedtime      SOCIAL HISTORY:  Smoker:  YES / NO        PACK YEARS:                         WHEN QUIT?  ETOH use:  YES / NO               FREQUENCY / QUANTITY:  Ilicit Drug use:  YES / NO  Occupation:  Assisted device use (Cane / Walker):  Live with:    FAMILY HISTORY:      VITALS:  Vital Signs Last 24 Hrs  T(C): 36.4 (16 May 2023 12:41), Max: 36.7 (15 May 2023 22:08)  T(F): 97.6 (16 May 2023 12:41), Max: 98.1 (15 May 2023 22:08)  HR: 83 (16 May 2023 12:41) (83 - 104)  BP: 111/59 (16 May 2023 12:41) (82/55 - 127/74)  BP(mean): --  RR: 20 (16 May 2023 12:41) (14 - 20)  SpO2: 99% (16 May 2023 12:41) (95% - 99%)    Parameters below as of 16 May 2023 12:41  Patient On (Oxygen Delivery Method): room air        LABS/DIAGNOSTIC TESTS:                          8.7    12.75 )-----------( 375      ( 16 May 2023 05:36 )             29.5     WBC Count: 12.75 K/uL ( @ 05:36)  WBC Count: 13.35 K/uL (05-15 @ 22:43)          140  |  111<H>  |  9   ----------------------------<  145<H>  3.2<L>   |  22  |  0.82    Ca    8.2<L>      16 May 2023 05:36  Phos  2.3       Mg     6.4         TPro  5.3<L>  /  Alb  1.2<L>  /  TBili  0.5  /  DBili  x   /  AST  5<L>  /  ALT  11  /  AlkPhos  82        Urinalysis Basic - ( 16 May 2023 05:27 )    Color: Angelique / Appearance: very cloudy / S.015 / pH: x  Gluc: x / Ketone: Negative  / Bili: Small / Urobili: 8 mg/dL   Blood: x / Protein: 100 mg/dL / Nitrite: Negative   Leuk Esterase: Moderate / RBC: 5-10 /HPF / WBC 26-50 /HPF   Sq Epi: x / Non Sq Epi: x / Bacteria: Many /HPF        LIVER FUNCTIONS - ( 16 May 2023 05:36 )  Alb: 1.2 g/dL / Pro: 5.3 g/dL / ALK PHOS: 82 U/L / ALT: 11 U/L DA / AST: 5 U/L / GGT: x             PT/INR - ( 15 May 2023 22:43 )   PT: 17.1 sec;   INR: 1.43 ratio         PTT - ( 15 May 2023 22:43 )  PTT:37.0 sec    LACTATE:Lactate, Blood: 2.1 mmol/L ( @ 05:30)  Lactate, Blood: 3.0 mmol/L (05-15 @ 22:43)      ABG -     CULTURES:   .Urine  03-28 @ 14:55   No growth  --  --      .Blood   @ 13:45   No Growth Final  --  --          RADIOLOGY:< from: CT Angio Abdomen and Pelvis w/ IV Cont (23 @ 01:13) >  ACC: [id number] EXAM:  CT ANGIO ABD PELV (W)AW IC   ORDERED BY: JACK HAWKINS     PROCEDURE DATE:  2023          INTERPRETATION:  CLINICAL INDICATION:  GI bleed    COMPARISON: GI bleed    TECHNIQUE: CT imaging of the abdomen and pelvis was performed without IV   contrast.    FINDINGS:    LOWER CHEST: within normal limits.    ABDOMEN:    Evaluation of the visceral organs and bowel is limited due to lack of IV   contrast.    LOWER CHEST: Subsegmental atelectasis.    LIVER: Within normal limits.  BILE DUCTS: Normal caliber.  GALLBLADDER: Markedly distended gallbladder. No calcified gallstones.  SPLEEN: Within normal limits.  PANCREAS: Within normal limits.  ADRENALS: Within normal limits.  KIDNEYS/URETERS: Bilateral renal cysts. Mild hydroureter with a moderate   amount of air noted in both ureters, retrograde from the urinary bladder.   Subcentimeter nonobstructing intrarenal calculi.    BLADDER: Air noted in the urinary bladder which also demonstrates wall   thickening.  REPRODUCTIVE ORGANS: Prostate gland is mildly enlarged.    BOWEL: Moderate-sized hiatal hernia. Sigmoid diverticulosis with an   enflamed diverticulum in the mid to distal sigmoid colon consistent with   acute diverticulitis. Wall thickening of the rectosigmoid colonwith   associated inflammatory change and suggestion of a fistulous connection   with the urinary bladder (sequence 8, image [id number]). There is marked wall   thickening of the redundant distal sigmoid colon. No bowel obstruction.   Appendix is normal.  PERITONEUM: No ascites.  VESSELS: Within normal limits.  RETROPERITONEUM/LYMPH NODES: No pathologic lymphadenopathy.  ABDOMINAL WALL: Small fat-containing umbilical and paraumbilical hernias.   Small bilateral fat-containing inguinal hernias.  BONES:Degenerative changes of the spine. Chronic left rib fracture.    IMPRESSION:  Acute sigmoid diverticulitis with suggestion of a fistulous connection   with the urinary bladder as described above. There is marked wall   thickening of the redundant distal sigmoid colon. Ischemia and/or   underlying neoplasm should be considered. Please note that active GI   bleed cannot be excluded on this noncontrast study.    Markedly distended gallbladder. No calcified gallstones. Abdominal   ultrasound suggested for further evaluation.      The findings were discussed with DR HAWKINS on 2023 2:12 AM.  Hospital policies for call back including read back policies were   followed. The verbal communication call back supplements this written   report.    --- End of Report ---            MAVIS LI MD; Attending Radiologist  This document has been electronically signed. May 16 2023  2:22AM    < end of copied text >  -------------------------------------------------------------------------------------------------------------------------------------------------------------------------------------------------------------------------------------------------  ACC: [id number] EXAM:  XR CHEST PORTABLE URGENT 1V   ORDERED BY: JACK HAWKINS     PROCEDURE DATE:  2023          INTERPRETATION:  Portable chest radiograph    CLINICAL INFORMATION: Free air.    TECHNIQUE:  Portable  AP chest radiograph.    COMPARISON: 3/28/2023 chest x-ray .    FINDINGS:  CATHETERS AND TUBES: None    PULMONARY: No subdiaphragmatic free air. The visualized lungs are clear   of airspace consolidations or pleural effusions.   No pneumothorax.    HEART/VASCULAR: The heart and mediastinum size and configuration are   within normal limits.    BONES: Visualized osseous thorax intact.    IMPRESSION:   No radiographic evidence of active chest disease.    --- End of Report ---            ALON HAWKINS MD; Attending Radiologist  This document has been electronically signed. May 16 2023  4:04PM    < end of copied text >        ROS  [  ] UNABLE TO ELICIT               HPI:  Patient is 89 years old male from [facility] with past medical history of hypertension, hyperlipidemia. glaucoma, GERD, BPH, hypothyroidism, and prostate cancer, baseline A&Ox1 who was transferred due to rectal bleeding. His BP was around 85/50. Patient also has elevated WBCs and elevated lactate. Patient was admitted in April with suspected bacteremia and dysphagia for which he had esophagogram that showed Mildly dilated proximal and midesophagus with mild narrowing of the distal esophagus with mild irregularity of esophageal wall. In ED, Patient received 1L IV bolus of NS. DDAVPx1 and started on PPI drip. Patient doesn't seem to have chest pain, abdominal pain or vomiting or shortness of breath.          History as above, asked to see this patient was sent in from a NH who had rectal bleeding and hypotension and was found to have an elevated WBC count and lactate level. He is lethargic though arousable and not talking to me at all and makes some grunting sounds, I spoke with his nurse who states that rarely he occasionally says a few words. He was found to have acute Sigmoid diverticulitis with a possible colovesicular fistula. He has a mildly elevated WBC count but no fevers.           PAST MEDICAL & SURGICAL HISTORY:  Hypothyroidism      HTN (hypertension)      HLD (hyperlipidemia)      Glaucoma      GERD (gastroesophageal reflux disease)      BPH (benign prostatic hyperplasia)      Prostate cancer      No significant past surgical history          No Known Allergies      Meds:  ascorbic acid 500 milliGRAM(s) Oral daily  finasteride 5 milliGRAM(s) Oral daily  levothyroxine 25 MICROGram(s) Oral daily  pantoprazole  Injectable 40 milliGRAM(s) IV Push two times a day  piperacillin/tazobactam IVPB.. 3.375 Gram(s) IV Intermittent every 8 hours  simvastatin 20 milliGRAM(s) Oral at bedtime  sodium chloride 0.9%. 1000 milliLiter(s) IV Continuous <Continuous>  tamsulosin 0.4 milliGRAM(s) Oral at bedtime      SOCIAL HISTORY: unknown    FAMILY HISTORY: unknown      VITALS:  Vital Signs Last 24 Hrs  T(C): 36.4 (16 May 2023 12:41), Max: 36.7 (15 May 2023 22:08)  T(F): 97.6 (16 May 2023 12:41), Max: 98.1 (15 May 2023 22:08)  HR: 83 (16 May 2023 12:41) (83 - 104)  BP: 111/59 (16 May 2023 12:41) (82/55 - 127/74)  BP(mean): --  RR: 20 (16 May 2023 12:41) (14 - 20)  SpO2: 99% (16 May 2023 12:41) (95% - 99%)    Parameters below as of 16 May 2023 12:41  Patient On (Oxygen Delivery Method): room air        LABS/DIAGNOSTIC TESTS:                          8.7    12.75 )-----------( 375      ( 16 May 2023 05:36 )             29.5     WBC Count: 12.75 K/uL ( @ 05:36)  WBC Count: 13.35 K/uL (05-15 @ 22:43)          140  |  111<H>  |  9   ----------------------------<  145<H>  3.2<L>   |  22  |  0.82    Ca    8.2<L>      16 May 2023 05:36  Phos  2.3       Mg     6.4         TPro  5.3<L>  /  Alb  1.2<L>  /  TBili  0.5  /  DBili  x   /  AST  5<L>  /  ALT  11  /  AlkPhos  82  16      Urinalysis Basic - ( 16 May 2023 05:27 )    Color: Angelique / Appearance: very cloudy / S.015 / pH: x  Gluc: x / Ketone: Negative  / Bili: Small / Urobili: 8 mg/dL   Blood: x / Protein: 100 mg/dL / Nitrite: Negative   Leuk Esterase: Moderate / RBC: 5-10 /HPF / WBC 26-50 /HPF   Sq Epi: x / Non Sq Epi: x / Bacteria: Many /HPF        LIVER FUNCTIONS - ( 16 May 2023 05:36 )  Alb: 1.2 g/dL / Pro: 5.3 g/dL / ALK PHOS: 82 U/L / ALT: 11 U/L DA / AST: 5 U/L / GGT: x             PT/INR - ( 15 May 2023 22:43 )   PT: 17.1 sec;   INR: 1.43 ratio         PTT - ( 15 May 2023 22:43 )  PTT:37.0 sec    LACTATE:Lactate, Blood: 2.1 mmol/L ( @ 05:30)  Lactate, Blood: 3.0 mmol/L (05-15 @ 22:43)      ABG -     CULTURES:   .Urine   @ 14:55   No growth  --  --      .Blood   @ 13:45   No Growth Final  --  --          RADIOLOGY:< from: CT Angio Abdomen and Pelvis w/ IV Cont (23 @ 01:13) >  ACC: [id number] EXAM:  CT ANGIO ABD PELV (W)AW IC   ORDERED BY: JACK HAWKINS     PROCEDURE DATE:  2023          INTERPRETATION:  CLINICAL INDICATION:  GI bleed    COMPARISON: GI bleed    TECHNIQUE: CT imaging of the abdomen and pelvis was performed without IV   contrast.    FINDINGS:    LOWER CHEST: within normal limits.    ABDOMEN:    Evaluation of the visceral organs and bowel is limited due to lack of IV   contrast.    LOWER CHEST: Subsegmental atelectasis.    LIVER: Within normal limits.  BILE DUCTS: Normal caliber.  GALLBLADDER: Markedly distended gallbladder. No calcified gallstones.  SPLEEN: Within normal limits.  PANCREAS: Within normal limits.  ADRENALS: Within normal limits.  KIDNEYS/URETERS: Bilateral renal cysts. Mild hydroureter with a moderate   amount of air noted in both ureters, retrograde from the urinary bladder.   Subcentimeter nonobstructing intrarenal calculi.    BLADDER: Air noted in the urinary bladder which also demonstrates wall   thickening.  REPRODUCTIVE ORGANS: Prostate gland is mildly enlarged.    BOWEL: Moderate-sized hiatal hernia. Sigmoid diverticulosis with an   enflamed diverticulum in the mid to distal sigmoid colon consistent with   acute diverticulitis. Wall thickening of the rectosigmoid colonwith   associated inflammatory change and suggestion of a fistulous connection   with the urinary bladder (sequence 8, image [id number]). There is marked wall   thickening of the redundant distal sigmoid colon. No bowel obstruction.   Appendix is normal.  PERITONEUM: No ascites.  VESSELS: Within normal limits.  RETROPERITONEUM/LYMPH NODES: No pathologic lymphadenopathy.  ABDOMINAL WALL: Small fat-containing umbilical and paraumbilical hernias.   Small bilateral fat-containing inguinal hernias.  BONES:Degenerative changes of the spine. Chronic left rib fracture.    IMPRESSION:  Acute sigmoid diverticulitis with suggestion of a fistulous connection   with the urinary bladder as described above. There is marked wall   thickening of the redundant distal sigmoid colon. Ischemia and/or   underlying neoplasm should be considered. Please note that active GI   bleed cannot be excluded on this noncontrast study.    Markedly distended gallbladder. No calcified gallstones. Abdominal   ultrasound suggested for further evaluation.      The findings were discussed with DR HAWKINS on 2023 2:12 AM.  Hospital policies for call back including read back policies were   followed. The verbal communication call back supplements this written   report.    --- End of Report ---            MAVIS LI MD; Attending Radiologist  This document has been electronically signed. May 16 2023  2:22AM    < end of copied text >  -------------------------------------------------------------------------------------------------------------------------------------------------------------------------------------------------------------------------------------------------  ACC: [id number] EXAM:  XR CHEST PORTABLE URGENT 1V   ORDERED BY: JACK HAWKINS     PROCEDURE DATE:  2023          INTERPRETATION:  Portable chest radiograph    CLINICAL INFORMATION: Free air.    TECHNIQUE:  Portable  AP chest radiograph.    COMPARISON: 3/28/2023 chest x-ray .    FINDINGS:  CATHETERS AND TUBES: None    PULMONARY: No subdiaphragmatic free air. The visualized lungs are clear   of airspace consolidations or pleural effusions.   No pneumothorax.    HEART/VASCULAR: The heart and mediastinum size and configuration are   within normal limits.    BONES: Visualized osseous thorax intact.    IMPRESSION:   No radiographic evidence of active chest disease.    --- End of Report ---            ALON HAWKINS MD; Attending Radiologist  This document has been electronically signed. May 16 2023  4:04PM    < end of copied text >        ROS  [ x ] UNABLE TO ELICIT

## 2023-05-16 NOTE — CONSULT NOTE ADULT - SUBJECTIVE AND OBJECTIVE BOX
HPI:   89M from nursing home, brought to ED after bloody bowel movement witnessed by nursing home.  Patient with significant dementia at baseline, AAOx1, additional history obtained from chart.  Patient in no acute distress.  Afebrile, hemodynamically stable at time of exam.   In ED, Patient received 1L IV bolus of NS. DDAVPx1 and started on PPI drip. CT scan showing acute diverticulitis with air in the bladder and collecting system, +UTI concerning for possible colovesicular fistula. General Surgery consulted to evaluate. Per chart, patient full code.       PMH:  hypertension, hyperlipidemia. glaucoma, GERD, BPH, hypothyroidism, and prostate cancer  PSH: unknown  NKDA    Vital Signs Last 24 Hrs  T(C): 36.4 (16 May 2023 05:10), Max: 36.7 (15 May 2023 22:08)  T(F): 97.5 (16 May 2023 05:10), Max: 98.1 (15 May 2023 22:08)  HR: 98 (16 May 2023 05:10) (92 - 104)  BP: 121/94 (16 May 2023 05:10) (82/55 - 127/74)  BP(mean): --  RR: 16 (16 May 2023 05:10) (14 - 18)  SpO2: 99% (16 May 2023 05:10) (97% - 99%)    Parameters below as of 16 May 2023 05:10  Patient On (Oxygen Delivery Method): room air    Physical:  GA: AAOx1, NAD  CVS: RRR  HEENT: normocephalic, atraumatic  Pulm: nonlabored breathing  Abd: soft, nondistended.  appears to be tender in LLQ, however exam limited by mental status. no guarding.  : normal ext. genitalia                          9.7    13.35 )-----------( 415      ( 15 May 2023 22:43 )             32.1   05-15    139  |  108  |  11  ----------------------------<  131<H>  3.4<L>   |  25  |  0.93    Ca    8.3<L>      15 May 2023 22:43    TPro  6.1  /  Alb  1.4<L>  /  TBili  0.4  /  DBili  x   /  AST  8<L>  /  ALT  13  /  AlkPhos  95  05-15    Urinalysis Basic - ( 16 May 2023 05:27 )    Color: Angelique / Appearance: very cloudy / S.015 / pH: x  Gluc: x / Ketone: Negative  / Bili: Small / Urobili: 8 mg/dL   Blood: x / Protein: 100 mg/dL / Nitrite: Negative   Leuk Esterase: Moderate / RBC: x / WBC x   Sq Epi: x / Non Sq Epi: x / Bacteria: x    < from: CT Angio Abdomen and Pelvis w/ IV Cont (23 @ 01:13) >  ABDOMEN:    Evaluation of the visceral organs and bowel is limited due to lack of IV   contrast.    LOWER CHEST: Subsegmental atelectasis.    LIVER: Within normal limits.  BILE DUCTS: Normal caliber.  GALLBLADDER: Markedly distended gallbladder. No calcified gallstones.  SPLEEN: Within normal limits.  PANCREAS: Within normal limits.  ADRENALS: Within normal limits.  KIDNEYS/URETERS: Bilateral renal cysts. Mild hydroureter with a moderate   amount of air noted in both ureters, retrograde from the urinary bladder.   Subcentimeter nonobstructing intrarenal calculi.    BLADDER: Air noted in the urinary bladder which also demonstrates wall   thickening.  REPRODUCTIVE ORGANS: Prostate gland is mildly enlarged.    BOWEL: Moderate-sized hiatal hernia. Sigmoid diverticulosis with an   enflamed diverticulum in the mid to distal sigmoid colon consistent with   acute diverticulitis. Wall thickening of the rectosigmoid colonwith   associated inflammatory change and suggestion of a fistulous connection   with the urinary bladder (sequence 8, image [id number]). There is marked wall   thickening of the redundant distal sigmoid colon. No bowel obstruction.   Appendix is normal.  PERITONEUM: No ascites.  VESSELS: Within normal limits.  RETROPERITONEUM/LYMPH NODES: No pathologic lymphadenopathy.  ABDOMINAL WALL: Small fat-containing umbilical and paraumbilical hernias.   Small bilateral fat-containing inguinal hernias.  BONES:Degenerative changes of the spine. Chronic left rib fracture.    IMPRESSION:  Acute sigmoid diverticulitis with suggestion of a fistulous connection   with the urinary bladder as described above. There is marked wall   thickening of the redundant distal sigmoid colon. Ischemia and/or   underlying neoplasm should be considered. Please note that active GI   bleed cannot be excluded on this noncontrast study.    < end of copied text >

## 2023-05-16 NOTE — CONSULT NOTE ADULT - CONSULT REASON
Diverticulitis and rectal bleeding
Acute Sigmoid Diverticulitis with possible colovesicular fistula
Diverticulitis with possible colovesicular fistula

## 2023-05-16 NOTE — H&P ADULT - HISTORY OF PRESENT ILLNESS
Patient is 89 years old male from [facility] with past medical history of hypertension, hyperlipidemia. glaucoma, GERD, BPH, hypothyroidism, and prostate cancer, baseline A&Ox1 who was transferred due to rectal bleeding. His BP was around 85/50. Patient also has elevated WBCs and elevated lactate. Patient was admitted in April with suspected bacteremia and dysphagia for which he had esophagogram that showed Mildly dilated proximal and midesophagus with mild narrowing of the distal esophagus with mild irregularity of esophageal wall. In ED, Patient received 1L IV bolus of NS. DDAVPx1 and started on PPI drip. Patient doesn't seem to have chest pain, abdominal pain or vomiting or shortness of breath.    REVIEW OF SYSTEMS:  Limited due to mental status

## 2023-05-16 NOTE — CONSULT NOTE ADULT - ASSESSMENT
Patient is a 89M with a PMHx of HTN, HLD, GERD, BPH, hypothyroidism, glaucoma, ESBL e.coli bacteremia (2020), and prostate CA, baseline A&O x 1, who presented from [facility] for rectal bleeding and hypotension. GI was consulted for rectal bleeding and diverticulitis.    Patient is a poor historian but pleasant, collateral obtained from chart review.   Patient was notably hypotensive 85/50 at his facility as well as reports of rectal bleeding.   Previous admission 3/28-4/6 for UTI and dysphagia.   He was evaluated by inpatient GI (Dr. Sumner, 3/29) for dysphagia, esophagram showed mildly dilated proximal & midesophagus with mild narrowing of distal esophagus with mild irregularity of esophageal wall, tentative EGD planned however unable to reach HCP or obtain 2-MD consent, subsequently discharged back to facility after passing SLP.     In the ED, BP 91/48, Hgb 9.7 (baseline Hgb 10.9 on 4/4/23), leukocytosis 13.35 with neutrophil dominance 86.8, K 3.4, Albumin 1.4, lactate 3.0 -> 2.1, lipase 62, UA+. s/p 1L NS, DDAVP x 1, and started on a PPI gtt. CT Angio notable for markedly distended GB, no gallstones, b/l renal cysts, moderate-sized HH, sigmoid diverticulosis with inflamed diverticulum in mid to distal sigmoid colon c/w acute diverticulitis. wall thickening of rectosigmoid colon a/w inflammatory change & suggestion of a fistulous connection with urinary bladder. marked wall thickening of redundant distal sigmoid colon, no obstruction. Surgery was consulted for diverticulitis with possible colovesicular fistula, however deemed poor candidate and to continue abx.     #Diverticulitis  #Dysphagia  #GERD  #Anemia  #Colovesicular fistula  Patient presented from his facility with reports of rectal bleeding and hypotension. Admission Hgb 9.7 (previously 10.9 on discharge 4/4/23), repeat Hgb 8.7 however no overt signs of bleeding. CT Angio notable for diverticulitis and fistulous connection with urinary bladder. Abdominal exam benign, BP 91/48, leukocytosis 13.35 -> 12.75, elevated lactate 3.0 -> 2.1, UA+. For further workup of fistulous connection, can consider gastrograffin enema study however unclear benefit given patient's advanced age, risk vs benefits of further pursuit including potential interventions. Recommend conservative management at this time.    	- Recommend goals of care discussion with patient's HCP  	- IV Protonix 40mg daily   	- Continue IV abx  	- Surgery following, appreciate recs  	- Maintain active T&S, 2 large bore peripheral IVs, transfuse for goal Hgb >7 or if symptomatic  	- Trend H/H daily  	- Monitor for s/sx of bleeding   	- HD stability per primary team    This note and its recommendations herein are preliminary until such time as cosigned by an attending.    GI will continue to follow.  Thank you for this consult!
89M from nursing home admitted to medicine service for management of GI bleed found to have acute diverticulitis of the sigmoid colon.  CT showing air in the bladder and bilateral ureters concerning for possible colovesicular fistula.   -No acute surgical intervention  -Continue antibiotics  -NPO  -pain control as needed  -IV hydration  -f/u cultures  -Given advanced age, dementia and overall functional status, patient is a poor candidate for surgical intervention at this time  -Goals of care discussion with family in regards to desired level of intervention  -Should family desire further intervention, can consider additional work up to confirm presence of fistula  -Surgery will continue to follow  
Acute Sigmoid Diverticulitis with possible fistula to bladder  Leukocytosis      Plan - Cont Zosyn 3.375 gms iv q8hrs  consider putting in a hendrickson to decompress his bladder   Urology/ surgery eval.

## 2023-05-16 NOTE — CONSULT NOTE ADULT - SUBJECTIVE AND OBJECTIVE BOX
[facility]    Patient is a 89y old  Male who presents with a chief complaint of Lower GI bleed (16 May 2023 05:35)    HPI:  Patient is 89 years old male from [facility] with past medical history of hypertension, hyperlipidemia. glaucoma, GERD, BPH, hypothyroidism, and prostate cancer, baseline A&Ox1 who was transferred due to rectal bleeding. His BP was around 85/50. Patient also has elevated WBCs and elevated lactate. Patient was admitted in April with suspected bacteremia and dysphagia for which he had esophagogram that showed Mildly dilated proximal and midesophagus with mild narrowing of the distal esophagus with mild irregularity of esophageal wall. In ED, Patient received 1L IV bolus of NS. DDAVPx1 and started on PPI drip. Patient doesn't seem to have chest pain, abdominal pain or vomiting or shortness of breath.    REVIEW OF SYSTEMS:  Limited due to mental status      (16 May 2023 01:40)      PMH/PSH:  PAST MEDICAL & SURGICAL HISTORY:  Hypothyroidism      HTN (hypertension)      HLD (hyperlipidemia)      Glaucoma      GERD (gastroesophageal reflux disease)      BPH (benign prostatic hyperplasia)      Prostate cancer    No significant past surgical history          FH:  FAMILY HISTORY:      MEDS:  MEDICATIONS  (STANDING):  ascorbic acid 500 milliGRAM(s) Oral daily  finasteride 5 milliGRAM(s) Oral daily  levothyroxine 25 MICROGram(s) Oral daily  pantoprazole  Injectable 40 milliGRAM(s) IV Push two times a day  piperacillin/tazobactam IVPB.. 3.375 Gram(s) IV Intermittent every 8 hours  potassium chloride  10 mEq/100 mL IVPB 10 milliEquivalent(s) IV Intermittent every 1 hour  simvastatin 20 milliGRAM(s) Oral at bedtime  sodium chloride 0.9%. 1000 milliLiter(s) (100 mL/Hr) IV Continuous <Continuous>  tamsulosin 0.4 milliGRAM(s) Oral at bedtime    MEDICATIONS  (PRN):    Allergies    No Known Allergies    Intolerances          ROS: Unable to obtain ROS iso A&O x 1 at baseline.   ______________________________________________________________________  PHYSICAL EXAM:  T(C): 36.7 (05-16-23 @ 08:31), Max: 36.7 (05-15-23 @ 22:08)  HR: 92 (05-16-23 @ 08:31)  BP: 91/48 (05-16-23 @ 08:31)  RR: 20 (05-16-23 @ 08:31)  SpO2: 95% (05-16-23 @ 08:31)  Wt(kg): --      GEN: NAD  HEENT: EOMI, conjunctivae anicteric, neck supple, dry mucous membranes  PULM: LSCTAB, no wheezing, rales, or rhonchi  CV: RRR, no m/r/b  GI: Soft, NT, ND; +BS in all four quadrants, no ascites, no Colon's sign  MSK: RIZO, no edema  NEURO: A&O x 0  ______________________________________________________________________  LABS:                        8.7    12.75 )-----------( 375      ( 16 May 2023 05:36 )             29.5     05-16    140  |  111<H>  |  9   ----------------------------<  145<H>  3.2<L>   |  22  |  0.82    Ca    8.2<L>      16 May 2023 05:36  Phos  2.3     05-16  Mg     6.4     05-16    TPro  5.3<L>  /  Alb  1.2<L>  /  TBili  0.5  /  DBili  x   /  AST  5<L>  /  ALT  11  /  AlkPhos  82  05-16    LIVER FUNCTIONS - ( 16 May 2023 05:36 )  Alb: 1.2 g/dL / Pro: 5.3 g/dL / ALK PHOS: 82 U/L / ALT: 11 U/L DA / AST: 5 U/L / GGT: x           PT/INR - ( 15 May 2023 22:43 )   PT: 17.1 sec;   INR: 1.43 ratio         PTT - ( 15 May 2023 22:43 )  PTT:37.0 sec  ____________________________________________    IMAGING:    CT ANGIO ABD PELV (W)AW IC   ORDERED BY: JACK HAWKINS     PROCEDURE DATE:  05/16/2023          INTERPRETATION:  CLINICAL INDICATION:  GI bleed    COMPARISON: GI bleed    TECHNIQUE: CT imaging of the abdomen and pelvis was performed without IV   contrast.    FINDINGS:    LOWER CHEST: within normal limits.    ABDOMEN:    Evaluation of the visceral organs and bowel is limited due to lack of IV   contrast.    LOWER CHEST: Subsegmental atelectasis.    LIVER: Within normal limits.  BILE DUCTS: Normal caliber.  GALLBLADDER: Markedly distended gallbladder. No calcified gallstones.  SPLEEN: Within normal limits.  PANCREAS: Within normal limits.  ADRENALS: Within normal limits.  KIDNEYS/URETERS: Bilateral renal cysts. Mild hydroureter with a moderate   amount of air noted in both ureters, retrograde from the urinary bladder.   Subcentimeter nonobstructing intrarenal calculi.    BLADDER: Air noted in the urinary bladder which also demonstrates wall   thickening.  REPRODUCTIVE ORGANS: Prostate gland is mildly enlarged.    BOWEL: Moderate-sized hiatal hernia. Sigmoid diverticulosis with an   enflamed diverticulum in the mid to distal sigmoid colon consistent with   acute diverticulitis. Wall thickening of the rectosigmoid colonwith   associated inflammatory change and suggestion of a fistulous connection   with the urinary bladder (sequence 8, image [id number]). There is marked wall   thickening of the redundant distal sigmoid colon. No bowel obstruction.   Appendix is normal.  PERITONEUM: No ascites.  VESSELS: Within normal limits.  RETROPERITONEUM/LYMPH NODES: No pathologic lymphadenopathy.  ABDOMINAL WALL: Small fat-containing umbilical and paraumbilical hernias.   Small bilateral fat-containing inguinal hernias.  BONES:Degenerative changes of the spine. Chronic left rib fracture.    IMPRESSION:  Acute sigmoid diverticulitis with suggestion of a fistulous connection   with the urinary bladder as described above. There is marked wall   thickening of the redundant distal sigmoid colon. Ischemia and/or   underlying neoplasm should be considered. Please note that active GI   bleed cannot be excluded on this noncontrast study.    Markedly distended gallbladder. No calcified gallstones. Abdominal   ultrasound suggested for further evaluation.

## 2023-05-16 NOTE — H&P ADULT - NSHPPHYSICALEXAM_GEN_ALL_CORE
ICU Vital Signs Last 24 Hrs  T(C): 36.7 (15 May 2023 22:08), Max: 36.7 (15 May 2023 22:08)  T(F): 98.1 (15 May 2023 22:08), Max: 98.1 (15 May 2023 22:08)  HR: 92 (15 May 2023 22:25) (92 - 104)  BP: 127/74 (15 May 2023 22:25) (82/55 - 127/74)  RR: 18 (15 May 2023 22:25) (14 - 18)  SpO2: 99% (15 May 2023 22:25) (97% - 99%)  O2 Parameters below as of 15 May 2023 22:25  Patient On (Oxygen Delivery Method): room air    CONSTITUTIONAL: Well groomed, no apparent distress  EYES: PERRLA and symmetric, EOMI, No conjunctival or scleral injection, non-icteric  ENMT: (+) Oral mucosa with dry mucous membranes. Normal dentition; no pharyngeal injection or exudates  RESP: No respiratory distress, no use of accessory muscles; CTA b/l, no WRR  CV: RRR, +S1S2, no MRG; no JVD; no peripheral edema  GI: Soft, NT, ND, no rebound, no guarding; no palpable masses; no hepatosplenomegaly; no hernia palpated  LYMPH: No cervical LAD or tenderness; no axillary LAD or tenderness; no inguinal LAD or tenderness  MSK: Normal gait; No digital clubbing or cyanosis; examination of the (head/neck/spine/ribs/pelvis, RUE, LUE, RLE, LLE) without misalignment,   SKIN: No rashes or ulcers noted; no subcutaneous nodules or induration palpable  NEURO: CN II-XII intact; normal reflexes in upper and lower extremities, sensation intact in upper and lower extremities b/l to light touch   PSYCH: Appropriate insight/judgment; (+) A+O x 1 oriented to self, mood and affect appropriate, recent/remote memory intact  CHEST/LUNG: Clear to auscultation bilaterally; No rales, rhonchi, wheezing, or rubs. Unlabored respirations  HEART: Regular rate and rhythm; No murmurs, rubs, or gallops  ABDOMEN: Bowel sounds present; Soft, Nontender, Nondistended. No hepatomegally  EXTREMITIES:  2+ Peripheral Pulses, brisk capillary refill. No clubbing, cyanosis, or edema  NERVOUS SYSTEM:  Alert & Oriented X1. speech unclear.   MSK: Unable to assess   SKIN: No rashes or lesions

## 2023-05-16 NOTE — H&P ADULT - ASSESSMENT
Patient is 89 years old male from [facility] with past medical history of hypertension, hyperlipidemia. glaucoma, GERD, BPH, hypothyroidism, and prostate cancer, baseline A&Ox1 who was transferred due to rectal bleeding. His BP was around 85/50. Patient also has elevated WBCs and elevated lactate. In ED, Patient received 1L IV bolus of NS. DDAVPx1 and started on PPI drip. Patient will need CT abdomen and pelvis IV contrast. Patient doesn't seem to have chest pain, abdominal pain or vomiting or shortness of breath.

## 2023-05-16 NOTE — H&P ADULT - ATTENDING COMMENTS
CASE D/W ER MD AND RESIDENT TEAM CASE D/W ER MD AND RESIDENT TEAM    # PROGNOSIS IS POOR. PLANNED FOR GOC CONVERSATION WITH PATIENT AND HCP.     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

## 2023-05-16 NOTE — ADVANCED PRACTICE NURSE CONSULT - RECOMMEDATIONS
-Clean the Coccyx wound with normal saline and apply skin prep to the surrounding skin  -Apply Calcium Alginate (Aquacel) to the wound bed and cover with a Foam dressing Q 72hrs PRN  -Elevate/float the patients heels using heel protectors and reposition the patient Q 2hrs using wedges or pillows

## 2023-05-16 NOTE — H&P ADULT - PROBLEM SELECTOR PLAN 3
- Esophagogram was done on last admission; Noted   - Start on IV fluids   - speech and swallow eval   - GI consult

## 2023-05-16 NOTE — H&P ADULT - PROBLEM SELECTOR PLAN 2
- HR>95  - WBCs 13K   - BP 85/50  - Lactate ~ 3  - Will start IV fluids   - Empirically rocephin; could be due to hypovolemia due to bleeding and dehydration   - Will send Urine and blood culture   - Chest X ray   - f/u cultures

## 2023-05-16 NOTE — CONSULT NOTE ADULT - GASTROINTESTINAL
soft/nondistended/normal active bowel sounds/no guarding/no rigidity/no organomegaly/no masses palpable/tender

## 2023-05-16 NOTE — CONSULT NOTE ADULT - NS ATTEND AMEND GEN_ALL_CORE FT
Pt seen and examined. Non verbal. NAD  Abd- soft, non distended, no tenderness elicited on exam, no guarding no rebound  CT images and labs reviewed. CT with evidence concerning for colovesicular fistula.   Given age and dementia, await goals of care discussion with family.

## 2023-05-16 NOTE — ADVANCED PRACTICE NURSE CONSULT - ASSESSMENT
This is a 89yr old male patient admitted for GI Hemorrhage, presenting with a healing Stage 3 Pressure injury to the Coccyx (3cm x 2cm x 0.1cm) with pink tissue and scant drainage. There is also evidence of blanchable erythema to the Bilateral Lat Feet

## 2023-05-17 DIAGNOSIS — K52.9 NONINFECTIVE GASTROENTERITIS AND COLITIS, UNSPECIFIED: ICD-10-CM

## 2023-05-17 DIAGNOSIS — K63.2 FISTULA OF INTESTINE: ICD-10-CM

## 2023-05-17 DIAGNOSIS — N82.3 FISTULA OF VAGINA TO LARGE INTESTINE: ICD-10-CM

## 2023-05-17 LAB
ALBUMIN SERPL ELPH-MCNC: 1.2 G/DL — LOW (ref 3.5–5)
ALP SERPL-CCNC: 84 U/L — SIGNIFICANT CHANGE UP (ref 40–120)
ALT FLD-CCNC: 9 U/L DA — LOW (ref 10–60)
ANION GAP SERPL CALC-SCNC: 6 MMOL/L — SIGNIFICANT CHANGE UP (ref 5–17)
APPEARANCE UR: ABNORMAL
AST SERPL-CCNC: 10 U/L — SIGNIFICANT CHANGE UP (ref 10–40)
BACTERIA # UR AUTO: ABNORMAL /HPF
BILIRUB SERPL-MCNC: 0.5 MG/DL — SIGNIFICANT CHANGE UP (ref 0.2–1.2)
BILIRUB UR-MCNC: ABNORMAL
BUN SERPL-MCNC: 7 MG/DL — SIGNIFICANT CHANGE UP (ref 7–18)
CALCIUM SERPL-MCNC: 8.2 MG/DL — LOW (ref 8.4–10.5)
CHLORIDE SERPL-SCNC: 114 MMOL/L — HIGH (ref 96–108)
CO2 SERPL-SCNC: 19 MMOL/L — LOW (ref 22–31)
COLOR SPEC: ABNORMAL
COMMENT - URINE: SIGNIFICANT CHANGE UP
CREAT ?TM UR-MCNC: 27 MG/DL — SIGNIFICANT CHANGE UP
CREAT SERPL-MCNC: 0.64 MG/DL — SIGNIFICANT CHANGE UP (ref 0.5–1.3)
DIFF PNL FLD: ABNORMAL
EGFR: 90 ML/MIN/1.73M2 — SIGNIFICANT CHANGE UP
EPI CELLS # UR: ABNORMAL /HPF
GLUCOSE SERPL-MCNC: 91 MG/DL — SIGNIFICANT CHANGE UP (ref 70–99)
GLUCOSE UR QL: NEGATIVE — SIGNIFICANT CHANGE UP
HCT VFR BLD CALC: 30.3 % — LOW (ref 39–50)
HGB BLD-MCNC: 8.7 G/DL — LOW (ref 13–17)
KETONES UR-MCNC: ABNORMAL
LEUKOCYTE ESTERASE UR-ACNC: ABNORMAL
MAGNESIUM SERPL-MCNC: 2 MG/DL — SIGNIFICANT CHANGE UP (ref 1.6–2.6)
MCHC RBC-ENTMCNC: 25 PG — LOW (ref 27–34)
MCHC RBC-ENTMCNC: 28.7 GM/DL — LOW (ref 32–36)
MCV RBC AUTO: 87.1 FL — SIGNIFICANT CHANGE UP (ref 80–100)
NITRITE UR-MCNC: NEGATIVE — SIGNIFICANT CHANGE UP
NRBC # BLD: 0 /100 WBCS — SIGNIFICANT CHANGE UP (ref 0–0)
OSMOLALITY UR: 527 MOS/KG — SIGNIFICANT CHANGE UP (ref 50–1200)
PH UR: 7 — SIGNIFICANT CHANGE UP (ref 5–8)
PHOSPHATE SERPL-MCNC: 2.9 MG/DL — SIGNIFICANT CHANGE UP (ref 2.5–4.5)
PLATELET # BLD AUTO: 369 K/UL — SIGNIFICANT CHANGE UP (ref 150–400)
POTASSIUM SERPL-MCNC: 3.6 MMOL/L — SIGNIFICANT CHANGE UP (ref 3.5–5.3)
POTASSIUM SERPL-SCNC: 3.6 MMOL/L — SIGNIFICANT CHANGE UP (ref 3.5–5.3)
POTASSIUM UR-SCNC: 43 MMOL/L — SIGNIFICANT CHANGE UP
PROT ?TM UR-MCNC: 246 MG/DL — HIGH (ref 0–12)
PROT SERPL-MCNC: 5.2 G/DL — LOW (ref 6–8.3)
PROT UR-MCNC: 100 MG/DL
RBC # BLD: 3.48 M/UL — LOW (ref 4.2–5.8)
RBC # FLD: 17.6 % — HIGH (ref 10.3–14.5)
RBC CASTS # UR COMP ASSIST: ABNORMAL /HPF (ref 0–2)
SODIUM SERPL-SCNC: 139 MMOL/L — SIGNIFICANT CHANGE UP (ref 135–145)
SODIUM UR-SCNC: 132 MMOL/L — SIGNIFICANT CHANGE UP
SP GR SPEC: 1.02 — SIGNIFICANT CHANGE UP (ref 1.01–1.02)
TRI-PHOS CRY UR QL COMP ASSIST: ABNORMAL /HPF
UROBILINOGEN FLD QL: 8 MG/DL
WBC # BLD: 13.08 K/UL — HIGH (ref 3.8–10.5)
WBC # FLD AUTO: 13.08 K/UL — HIGH (ref 3.8–10.5)
WBC UR QL: ABNORMAL /HPF (ref 0–5)

## 2023-05-17 PROCEDURE — 99232 SBSQ HOSP IP/OBS MODERATE 35: CPT

## 2023-05-17 RX ORDER — ACETAMINOPHEN 500 MG
1000 TABLET ORAL ONCE
Refills: 0 | Status: COMPLETED | OUTPATIENT
Start: 2023-05-17 | End: 2023-05-17

## 2023-05-17 RX ADMIN — PIPERACILLIN AND TAZOBACTAM 25 GRAM(S): 4; .5 INJECTION, POWDER, LYOPHILIZED, FOR SOLUTION INTRAVENOUS at 22:07

## 2023-05-17 RX ADMIN — SIMVASTATIN 20 MILLIGRAM(S): 20 TABLET, FILM COATED ORAL at 22:07

## 2023-05-17 RX ADMIN — PANTOPRAZOLE SODIUM 40 MILLIGRAM(S): 20 TABLET, DELAYED RELEASE ORAL at 17:07

## 2023-05-17 RX ADMIN — SODIUM CHLORIDE 100 MILLILITER(S): 9 INJECTION INTRAMUSCULAR; INTRAVENOUS; SUBCUTANEOUS at 11:25

## 2023-05-17 RX ADMIN — Medication 25 MICROGRAM(S): at 05:52

## 2023-05-17 RX ADMIN — SODIUM CHLORIDE 100 MILLILITER(S): 9 INJECTION INTRAMUSCULAR; INTRAVENOUS; SUBCUTANEOUS at 22:07

## 2023-05-17 RX ADMIN — PIPERACILLIN AND TAZOBACTAM 25 GRAM(S): 4; .5 INJECTION, POWDER, LYOPHILIZED, FOR SOLUTION INTRAVENOUS at 05:52

## 2023-05-17 RX ADMIN — SODIUM CHLORIDE 100 MILLILITER(S): 9 INJECTION INTRAMUSCULAR; INTRAVENOUS; SUBCUTANEOUS at 17:07

## 2023-05-17 RX ADMIN — PANTOPRAZOLE SODIUM 40 MILLIGRAM(S): 20 TABLET, DELAYED RELEASE ORAL at 05:52

## 2023-05-17 RX ADMIN — Medication 500 MILLIGRAM(S): at 11:25

## 2023-05-17 RX ADMIN — FINASTERIDE 5 MILLIGRAM(S): 5 TABLET, FILM COATED ORAL at 11:25

## 2023-05-17 RX ADMIN — PIPERACILLIN AND TAZOBACTAM 25 GRAM(S): 4; .5 INJECTION, POWDER, LYOPHILIZED, FOR SOLUTION INTRAVENOUS at 13:43

## 2023-05-17 RX ADMIN — Medication 1000 MILLIGRAM(S): at 06:49

## 2023-05-17 RX ADMIN — Medication 400 MILLIGRAM(S): at 06:23

## 2023-05-17 RX ADMIN — SODIUM CHLORIDE 100 MILLILITER(S): 9 INJECTION INTRAMUSCULAR; INTRAVENOUS; SUBCUTANEOUS at 05:53

## 2023-05-17 RX ADMIN — TAMSULOSIN HYDROCHLORIDE 0.4 MILLIGRAM(S): 0.4 CAPSULE ORAL at 22:07

## 2023-05-17 NOTE — PROGRESS NOTE ADULT - SUBJECTIVE AND OBJECTIVE BOX
GI Progress Note    Patient is a 89y old  Male who presents with a chief complaint of Lower GI bleed (17 May 2023 13:59)    GI was consulted for diverticulitis and rectal bleeding.    24-HOUR INTERVAL EVENTS: Urinary incontinence with fecal particles, Hgb stable, no overt signs of bleeding, more lethargic. Prognosis guarded, patient may benefit from palliative care consult. Surgery following, no surgical intervention. Continues on IV Zosyn.    MEDICATIONS  (STANDING):  ascorbic acid 500 milliGRAM(s) Oral daily  finasteride 5 milliGRAM(s) Oral daily  levothyroxine 25 MICROGram(s) Oral daily  pantoprazole  Injectable 40 milliGRAM(s) IV Push two times a day  piperacillin/tazobactam IVPB.. 3.375 Gram(s) IV Intermittent every 8 hours  simvastatin 20 milliGRAM(s) Oral at bedtime  sodium chloride 0.9%. 1000 milliLiter(s) (100 mL/Hr) IV Continuous <Continuous>  tamsulosin 0.4 milliGRAM(s) Oral at bedtime    MEDICATIONS  (PRN):    __________________________________________________  REVIEW OF SYSTEMS:  Unable to obtain ROS iso lethargy/AMS/baseline A&O x 1.   ________________________________________________  PHYSICAL EXAM    Vital Signs Last 24 Hrs  T(C): 36.5 (17 May 2023 13:53), Max: 36.5 (17 May 2023 13:53)  T(F): 97.7 (17 May 2023 13:53), Max: 97.7 (17 May 2023 13:53)  HR: 107 (17 May 2023 13:53) (96 - 107)  BP: 174/77 (17 May 2023 13:53) (103/50 - 174/77)  BP(mean): --  RR: 20 (17 May 2023 13:53) (20 - 20)  SpO2: 100% (17 May 2023 13:53) (100% - 100%)    Parameters below as of 17 May 2023 13:53  Patient On (Oxygen Delivery Method): room air      GEN: NAD, lethargic  HEENT: EOMI, conjunctivae anicteric, neck supple, dry mucous membranes  PULM: LCTAB, no wheezing, rales, or rhonchi  CV: RRR, no m/r/g  GI: soft, tender on deep palpation (facial grimaces noted), ND; +BS in all four quadrants, no ascites  MSK: RIZO  NEURO: A&O x 0, nonverbal, moaning noted  _________________________________________________  LABS:                        8.7    13.08 )-----------( 369      ( 17 May 2023 06:46 )             30.3     05-    139  |  114<H>  |  7   ----------------------------<  91  3.6   |  19<L>  |  0.64    Ca    8.2<L>      17 May 2023 06:46  Phos  2.9       Mg     2.0         TPro  5.2<L>  /  Alb  1.2<L>  /  TBili  0.5  /  DBili  x   /  AST  10  /  ALT  9<L>  /  AlkPhos  84  05-    PT/INR - ( 15 May 2023 22:43 )   PT: 17.1 sec;   INR: 1.43 ratio         PTT - ( 15 May 2023 22:43 )  PTT:37.0 sec  Urinalysis Basic - ( 16 May 2023 05:27 )    Color: Angelique / Appearance: very cloudy / S.015 / pH: x  Gluc: x / Ketone: Negative  / Bili: Small / Urobili: 8 mg/dL   Blood: x / Protein: 100 mg/dL / Nitrite: Negative   Leuk Esterase: Moderate / RBC: 5-10 /HPF / WBC 26-50 /HPF   Sq Epi: x / Non Sq Epi: x / Bacteria: Many /HPF      CAPILLARY BLOOD GLUCOSE      POCT Blood Glucose.: 94 mg/dL (16 May 2023 17:08)    COVID-19 PCR: NotDetec (2023 10:35)  SARS-CoV-2: NotDete (28 Mar 2023 13:45)      RADIOLOGY & ADDITIONAL TESTS:      CT ANGIO ABD PELV (W)AW IC   ORDERED BY: JACK HAWKINS     PROCEDURE DATE:  2023          INTERPRETATION:  CLINICAL INDICATION:  GI bleed    COMPARISON: GI bleed    TECHNIQUE: CT imaging of the abdomen and pelvis was performed without IV   contrast.    FINDINGS:    LOWER CHEST: within normal limits.    ABDOMEN:    Evaluation of the visceral organs and bowel is limited due to lack of IV   contrast.    LOWER CHEST: Subsegmental atelectasis.    LIVER: Within normal limits.  BILE DUCTS: Normal caliber.  GALLBLADDER: Markedly distended gallbladder. No calcified gallstones.  SPLEEN: Within normal limits.  PANCREAS: Within normal limits.  ADRENALS: Within normal limits.  KIDNEYS/URETERS: Bilateral renal cysts. Mild hydroureter with a moderate   amount of air noted in both ureters, retrograde from the urinary bladder.   Subcentimeter nonobstructing intrarenal calculi.    BLADDER: Air noted in the urinary bladder which also demonstrates wall   thickening.  REPRODUCTIVE ORGANS: Prostate gland is mildly enlarged.    BOWEL: Moderate-sized hiatal hernia. Sigmoid diverticulosis with an   enflamed diverticulum in the mid to distal sigmoid colon consistent with   acute diverticulitis. Wall thickening of the rectosigmoid colonwith   associated inflammatory change and suggestion of a fistulous connection   with the urinary bladder (sequence 8, image [id number]). There is marked wall   thickening of the redundant distal sigmoid colon. No bowel obstruction.   Appendix is normal.  PERITONEUM: No ascites.  VESSELS: Within normal limits.  RETROPERITONEUM/LYMPH NODES: No pathologic lymphadenopathy.  ABDOMINAL WALL: Small fat-containing umbilical and paraumbilical hernias.   Small bilateral fat-containing inguinal hernias.  BONES:Degenerative changes of the spine. Chronic left rib fracture.    IMPRESSION:  Acute sigmoid diverticulitis with suggestion of a fistulous connection   with the urinary bladder as described above. There is marked wall   thickening of the redundant distal sigmoid colon. Ischemia and/or   underlying neoplasm should be considered. Please note that active GI   bleed cannot be excluded on this noncontrast study.    Markedly distended gallbladder. No calcified gallstones. Abdominal   ultrasound suggested for further evaluation.

## 2023-05-17 NOTE — PROGRESS NOTE ADULT - ASSESSMENT
Patient is a 89M with a PMHx of HTN, HLD, GERD, BPH, hypothyroidism, glaucoma, ESBL e.coli bacteremia (2020), and prostate CA, baseline A&O x 1, who presented from [facility] for rectal bleeding and hypotension. GI was consulted for rectal bleeding and diverticulitis.    #Diverticulitis  #Dysphagia  #GERD  #Anemia  #Colovesicular fistula  Patient presented from his facility with reports of rectal bleeding and hypotension. Admission Hgb 9.7 (previously 10.9 on discharge 4/4/23), repeat Hgb 8.7 however no overt signs of bleeding. CT Angio notable for diverticulitis and fistulous connection with urinary bladder. Abdominal exam benign, BP 91/48, leukocytosis 13.35 -> 12.75, elevated lactate 3.0 -> 2.1, UA+. For further workup of fistulous connection, can consider gastrograffin enema study however unclear benefit given patient's advanced age, risk vs benefits of further pursuit including potential interventions. Recommend conservative management at this time. ID following for abx management, continues on Zosyn.   5/17: Hgb 8.7 (from 8.7), stable, no overt signs of bleeding. Urinary incontinence with ? fecal particles, lethargic, does not follow commands. Low suspicion of active GIB at this time. BP labile 103/50 -> 174/77, . Leukocytosis 13.08 (uptrend from 12.75). Afebrile. Abd tender to deep palpation (facial grimaces), otherwise ROS limited.     	- Recommend goals of care discussion with patient's HCP  	- Please obtain palliative care consult  	- IV Protonix 40mg daily   	- ID following for abx management, remains on IV Zosyn  	- Surgery following: no surgical intervention  	- Maintain active T&S, 2 large bore peripheral IVs, transfuse for goal Hgb >7 or if symptomatic  	- Trend H/H daily  	- Monitor for s/sx of bleeding   	- Serial abdominal exams  	- HD stability per primary team  	- No acute GI intervention given no s/sx of GIB    This note and its recommendations herein are preliminary until such time as cosigned by an attending.    GI will sign off at this time.  Thank you for involving us in the care of Mr. Jarrod Valera.  Please re-consult GI PRN.

## 2023-05-17 NOTE — PROGRESS NOTE ADULT - ASSESSMENT
Patient is 89 years old male from [facility] with past medical history of hypertension, hyperlipidemia. glaucoma, GERD, BPH, hypothyroidism, and prostate cancer, baseline A&Ox1 who was transferred due to rectal bleeding. His BP was around 85/50. Patient also has elevated WBCs and elevated lactate. In ED, Patient received 1L IV bolus of NS. DDAVPx1 and started on PPI drip. 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 Admitted for further management    Patient is 89 years old male from [facility] with past medical history of hypertension, hyperlipidemia. glaucoma, GERD, BPH, hypothyroidism, and prostate cancer, baseline A&Ox1 who was transferred due to rectal bleeding. His BP was around 85/50.  Patient also has elevated WBCs and elevated lactate. In ED, Patient received 1L IV bolus of NS, BP improved appropriately. DDAVPx1 and started on PPI drip. 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 Admitted for further management. Surgery evaluated the pt and recommended conservative management. Pt started on IV PPI 40mg BID, NPO, IVF, rocephin. BCx and UCx sent. Pending GOC and palliative discussion with HCP.

## 2023-05-17 NOTE — PROGRESS NOTE ADULT - PROBLEM SELECTOR PLAN 2
- Patient brought to ED due to rectal bleeding in [facility]  - No more episodes since admission  - Hb 9.5; Baseline 13 >> 8.7  - C/W PPI  - Hold aspirin  - IV fluids 0.9% NS maintainance fluids  - F/u CT abdomen and pelvis IV contrast   - CBC q6  - GI consult for possible colonoscope

## 2023-05-17 NOTE — PROGRESS NOTE ADULT - PROBLEM SELECTOR PLAN 3
- HR>95  - WBCs 13K   - BP 85/50 > IMPROVED WITH IVF   - Lactate ~ 3  - Empirically rocephin; could be due to hypovolemia due to bleeding and dehydration   - Will send Urine and blood culture   - Chest X ray   - f/u cultures

## 2023-05-17 NOTE — PROGRESS NOTE ADULT - SUBJECTIVE AND OBJECTIVE BOX
89y Male    Meds:  piperacillin/tazobactam IVPB.. 3.375 Gram(s) IV Intermittent every 8 hours    Allergies    No Known Allergies    Intolerances        VITALS:  Vital Signs Last 24 Hrs  T(C): 36.5 (17 May 2023 13:53), Max: 36.5 (17 May 2023 13:53)  T(F): 97.7 (17 May 2023 13:53), Max: 97.7 (17 May 2023 13:53)  HR: 107 (17 May 2023 13:53) (96 - 107)  BP: 135/58 (17 May 2023 17:05) (103/50 - 174/77)  BP(mean): --  RR: 20 (17 May 2023 13:53) (20 - 20)  SpO2: 100% (17 May 2023 13:53) (100% - 100%)    Parameters below as of 17 May 2023 13:53  Patient On (Oxygen Delivery Method): room air        LABS/DIAGNOSTIC TESTS:                          8.7    13.08 )-----------( 369      ( 17 May 2023 06:46 )             30.3         05-17    139  |  114<H>  |  7   ----------------------------<  91  3.6   |  19<L>  |  0.64    Ca    8.2<L>      17 May 2023 06:46  Phos  2.9     05-17  Mg     2.0     05-17    TPro  5.2<L>  /  Alb  1.2<L>  /  TBili  0.5  /  DBili  x   /  AST  10  /  ALT  9<L>  /  AlkPhos  84  05-17      LIVER FUNCTIONS - ( 17 May 2023 06:46 )  Alb: 1.2 g/dL / Pro: 5.2 g/dL / ALK PHOS: 84 U/L / ALT: 9 U/L DA / AST: 10 U/L / GGT: x             CULTURES: .Blood Blood-Peripheral  05-15 @ 22:25   No growth to date.  --  --      .Blood Blood-Peripheral  05-15 @ 22:10   No growth to date.  --  --              RADIOLOGY:      ROS:  [  ] UNABLE TO ELICIT 89y Male who is doing about the same , he apparently had some faeces in his urine secondary to his fistula. He has no fevers. He is still not talking or answering any questions. He is likely going to be transitioned to residential hospice at a NH as he is not a surgical candidate given his age.    Meds:  piperacillin/tazobactam IVPB.. 3.375 Gram(s) IV Intermittent every 8 hours    Allergies    No Known Allergies    Intolerances        VITALS:  Vital Signs Last 24 Hrs  T(C): 36.5 (17 May 2023 13:53), Max: 36.5 (17 May 2023 13:53)  T(F): 97.7 (17 May 2023 13:53), Max: 97.7 (17 May 2023 13:53)  HR: 107 (17 May 2023 13:53) (96 - 107)  BP: 135/58 (17 May 2023 17:05) (103/50 - 174/77)  BP(mean): --  RR: 20 (17 May 2023 13:53) (20 - 20)  SpO2: 100% (17 May 2023 13:53) (100% - 100%)    Parameters below as of 17 May 2023 13:53  Patient On (Oxygen Delivery Method): room air        LABS/DIAGNOSTIC TESTS:                          8.7    13.08 )-----------( 369      ( 17 May 2023 06:46 )             30.3         05-17    139  |  114<H>  |  7   ----------------------------<  91  3.6   |  19<L>  |  0.64    Ca    8.2<L>      17 May 2023 06:46  Phos  2.9     05-17  Mg     2.0     05-17    TPro  5.2<L>  /  Alb  1.2<L>  /  TBili  0.5  /  DBili  x   /  AST  10  /  ALT  9<L>  /  AlkPhos  84  05-17      LIVER FUNCTIONS - ( 17 May 2023 06:46 )  Alb: 1.2 g/dL / Pro: 5.2 g/dL / ALK PHOS: 84 U/L / ALT: 9 U/L DA / AST: 10 U/L / GGT: x             CULTURES: .Blood Blood-Peripheral  05-15 @ 22:25   No growth to date.  --  --      .Blood Blood-Peripheral  05-15 @ 22:10   No growth to date.  --  --              RADIOLOGY:      ROS:  [ x ] UNABLE TO ELICIT

## 2023-05-17 NOTE — PROGRESS NOTE ADULT - PROBLEM SELECTOR PLAN 1
CT abdomen/pelvis w/ iv contrast 5/16- Acute sigmoid diverticulitis with suggestion of a fistulous connection   with the urinary bladder as described above. There is marked wall thickening of the redundant distal sigmoid colon. Ischemia and/or underlying neoplasm should be considered.  - surgery consulted- do not recommend surgical intervention  - GI consulted  - c/w  IV Protonix 40mg daily  - Continue IV abx rocephin   - Maintain active T&S, 2 large bore peripheral IVs, transfuse for goal Hgb >7 or if symptomatic  - F/U GOC CONVERSATION ***, CONSULT PALLIATIVE

## 2023-05-17 NOTE — PROGRESS NOTE ADULT - CONVERSATION/DISCUSSION
Patient is calling again. Patient would like to talk with a nurse regarding surgery. Patient states she has not received a call regarding scheduling surgery, PSR gave patient surgery scheduler's phone number. Please give Carrie a call back at [phone].   Diagnosis/Prognosis/MOLST Discussed

## 2023-05-17 NOTE — PROGRESS NOTE ADULT - SUBJECTIVE AND OBJECTIVE BOX
Patient is a 89y old  Male who presents with a chief complaint of Lower GI bleed (16 May 2023 17:58)    PATIENT IS SEEN AND EXAMINED IN MEDICAL FLOOR.  JOANNT [    ]    IVETT [   ]      GT [   ]    ALLERGIES:  No Known Allergies      Daily     Daily     VITALS:    Vital Signs Last 24 Hrs  T(C): 36.4 (17 May 2023 04:45), Max: 36.4 (16 May 2023 12:41)  T(F): 97.6 (17 May 2023 04:45), Max: 97.6 (16 May 2023 12:41)  HR: 96 (17 May 2023 04:45) (83 - 98)  BP: 103/50 (17 May 2023 04:45) (103/50 - 148/86)  BP(mean): --  RR: 20 (17 May 2023 04:45) (20 - 20)  SpO2: 100% (17 May 2023 04:45) (99% - 100%)    Parameters below as of 17 May 2023 04:45  Patient On (Oxygen Delivery Method): room air        LABS:    CBC Full  -  ( 17 May 2023 06:46 )  WBC Count : 13.08 K/uL  RBC Count : 3.48 M/uL  Hemoglobin : 8.7 g/dL  Hematocrit : 30.3 %  Platelet Count - Automated : 369 K/uL  Mean Cell Volume : 87.1 fl  Mean Cell Hemoglobin : 25.0 pg  Mean Cell Hemoglobin Concentration : 28.7 gm/dL  Auto Neutrophil # : x  Auto Lymphocyte # : x  Auto Monocyte # : x  Auto Eosinophil # : x  Auto Basophil # : x  Auto Neutrophil % : x  Auto Lymphocyte % : x  Auto Monocyte % : x  Auto Eosinophil % : x  Auto Basophil % : x    PT/INR - ( 15 May 2023 22:43 )   PT: 17.1 sec;   INR: 1.43 ratio         PTT - ( 15 May 2023 22:43 )  PTT:37.0 sec  05-17    139  |  114<H>  |  7   ----------------------------<  91  3.6   |  19<L>  |  0.64    Ca    8.2<L>      17 May 2023 06:46  Phos  2.9     05-17  Mg     2.0     05-17    TPro  5.2<L>  /  Alb  1.2<L>  /  TBili  0.5  /  DBili  x   /  AST  10  /  ALT  9<L>  /  AlkPhos  84  05-17    CAPILLARY BLOOD GLUCOSE      POCT Blood Glucose.: 94 mg/dL (16 May 2023 17:08)        LIVER FUNCTIONS - ( 17 May 2023 06:46 )  Alb: 1.2 g/dL / Pro: 5.2 g/dL / ALK PHOS: 84 U/L / ALT: 9 U/L DA / AST: 10 U/L / GGT: x           Creatinine Trend: 0.64<--, 0.82<--, 0.93<--  I&O's Summary          .Blood Blood-Peripheral  05-15 @ 22:25   No growth to date.  --  --      .Blood Blood-Peripheral  05-15 @ 22:10   No growth to date.  --  --      .Urine  03-28 @ 14:55   No growth  --  --      .Blood  03-28 @ 13:45   No Growth Final  --  --          MEDICATIONS:    MEDICATIONS  (STANDING):  ascorbic acid 500 milliGRAM(s) Oral daily  finasteride 5 milliGRAM(s) Oral daily  levothyroxine 25 MICROGram(s) Oral daily  pantoprazole  Injectable 40 milliGRAM(s) IV Push two times a day  piperacillin/tazobactam IVPB.. 3.375 Gram(s) IV Intermittent every 8 hours  simvastatin 20 milliGRAM(s) Oral at bedtime  sodium chloride 0.9%. 1000 milliLiter(s) (100 mL/Hr) IV Continuous <Continuous>  tamsulosin 0.4 milliGRAM(s) Oral at bedtime      MEDICATIONS  (PRN):      REVIEW OF SYSTEMS:                           ALL ROS DONE [ X   ]    CONSTITUTIONAL:  LETHARGIC [   ], FEVER [   ], UNRESPONSIVE [   ]  CVS:  CP  [   ], SOB, [   ], PALPITATIONS [   ], DIZZYNESS [   ]  RS: COUGH [   ], SPUTUM [   ]  GI: ABDOMINAL PAIN [   ], NAUSEA [   ], VOMITINGS [   ], DIARRHEA [   ], CONSTIPATION [   ]  :  DYSURIA [   ], NOCTURIA [   ], INCREASED FREQUENCY [   ], DRIBLING [   ],  SKELETAL: PAINFUL JOINTS [   ], SWOLLEN JOINTS [   ], NECK ACHE [   ], LOW BACK ACHE [   ],  SKIN : ULCERS [   ], RASH [   ], ITCHING [   ]  CNS: HEAD ACHE [   ], DOUBLE VISION [   ], BLURRED VISION [   ], AMS / CONFUSION [   ], SEIZURES [   ], WEAKNESS [   ],TINGLING / NUMBNESS [   ]    PHYSICAL EXAMINATION:  GENERAL APPEARANCE: NO DISTRESS  HEENT:  NO PALLOR, NO  JVD,  NO   NODES, NECK SUPPLE  CVS: S1 +, S2 +,   RS: AEEB,  OCCASIONAL  RALES +,   NO RONCHI  ABD: SOFT, NT, NO, BS +  EXT: NO PE  SKIN: WARM,   SKELETAL:  ROM ACCEPTABLE  CNS:  AAO X    ,   DEFICITS    RADIOLOGY :      ASSESSMENT :     Gastrointestinal hemorrhage    No pertinent past medical history    Hypothyroidism    HTN (hypertension)    HLD (hyperlipidemia)    Glaucoma    GERD (gastroesophageal reflux disease)    BPH (benign prostatic hyperplasia)    Prostate cancer    No significant past surgical history        PLAN:  HPI:  Patient is 89 years old male from [facility] with past medical history of hypertension, hyperlipidemia. glaucoma, GERD, BPH, hypothyroidism, and prostate cancer, baseline A&Ox1 who was transferred due to rectal bleeding. His BP was around 85/50. Patient also has elevated WBCs and elevated lactate. Patient was admitted in April with suspected bacteremia and dysphagia for which he had esophagogram that showed Mildly dilated proximal and midesophagus with mild narrowing of the distal esophagus with mild irregularity of esophageal wall. In ED, Patient received 1L IV bolus of NS. DDAVPx1 and started on PPI drip. Patient doesn't seem to have chest pain, abdominal pain or vomiting or shortness of breath.    REVIEW OF SYSTEMS:  Limited due to mental status      (16 May 2023 01:40)    # PROGNOSIS IS POOR. PLANNED FOR GOC CONVERSATION WITH PATIENT AND HCP.     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        Patient is a 89y old  Male who presents with a chief complaint of Lower GI bleed (16 May 2023 17:58)    PATIENT IS SEEN AND EXAMINED IN MEDICAL FLOOR.    ALLERGIES:  No Known Allergies      VITALS:    Vital Signs Last 24 Hrs  T(C): 36.4 (17 May 2023 04:45), Max: 36.4 (16 May 2023 12:41)  T(F): 97.6 (17 May 2023 04:45), Max: 97.6 (16 May 2023 12:41)  HR: 96 (17 May 2023 04:45) (83 - 98)  BP: 103/50 (17 May 2023 04:45) (103/50 - 148/86)  BP(mean): --  RR: 20 (17 May 2023 04:45) (20 - 20)  SpO2: 100% (17 May 2023 04:45) (99% - 100%)    Parameters below as of 17 May 2023 04:45  Patient On (Oxygen Delivery Method): room air        LABS:    CBC Full  -  ( 17 May 2023 06:46 )  WBC Count : 13.08 K/uL  RBC Count : 3.48 M/uL  Hemoglobin : 8.7 g/dL  Hematocrit : 30.3 %  Platelet Count - Automated : 369 K/uL  Mean Cell Volume : 87.1 fl  Mean Cell Hemoglobin : 25.0 pg  Mean Cell Hemoglobin Concentration : 28.7 gm/dL  Auto Neutrophil # : x  Auto Lymphocyte # : x  Auto Monocyte # : x  Auto Eosinophil # : x  Auto Basophil # : x  Auto Neutrophil % : x  Auto Lymphocyte % : x  Auto Monocyte % : x  Auto Eosinophil % : x  Auto Basophil % : x    PT/INR - ( 15 May 2023 22:43 )   PT: 17.1 sec;   INR: 1.43 ratio         PTT - ( 15 May 2023 22:43 )  PTT:37.0 sec  -    139  |  114<H>  |  7   ----------------------------<  91  3.6   |  19<L>  |  0.64    Ca    8.2<L>      17 May 2023 06:46  Phos  2.9     05-17  Mg     2.0     05-17    TPro  5.2<L>  /  Alb  1.2<L>  /  TBili  0.5  /  DBili  x   /  AST  10  /  ALT  9<L>  /  AlkPhos  84      CAPILLARY BLOOD GLUCOSE      POCT Blood Glucose.: 94 mg/dL (16 May 2023 17:08)        LIVER FUNCTIONS - ( 17 May 2023 06:46 )  Alb: 1.2 g/dL / Pro: 5.2 g/dL / ALK PHOS: 84 U/L / ALT: 9 U/L DA / AST: 10 U/L / GGT: x           Creatinine Trend: 0.64<--, 0.82<--, 0.93<--  I&O's Summary          .Blood Blood-Peripheral  05-15 @ 22:25   No growth to date.  --  --      .Blood Blood-Peripheral  05-15 @ 22:10   No growth to date.  --  --      .Urine   @ 14:55   No growth  --  --      .Blood   @ 13:45   No Growth Final  --  --          MEDICATIONS:    MEDICATIONS  (STANDING):  ascorbic acid 500 milliGRAM(s) Oral daily  finasteride 5 milliGRAM(s) Oral daily  levothyroxine 25 MICROGram(s) Oral daily  pantoprazole  Injectable 40 milliGRAM(s) IV Push two times a day  piperacillin/tazobactam IVPB.. 3.375 Gram(s) IV Intermittent every 8 hours  simvastatin 20 milliGRAM(s) Oral at bedtime  sodium chloride 0.9%. 1000 milliLiter(s) (100 mL/Hr) IV Continuous <Continuous>  tamsulosin 0.4 milliGRAM(s) Oral at bedtime      MEDICATIONS  (PRN):      REVIEW OF SYSTEMS:                           ALL ROS DONE [ X   ]    CONSTITUTIONAL:  LETHARGIC [   ], FEVER [   ], UNRESPONSIVE [   ]  CVS:  CP  [   ], SOB, [   ], PALPITATIONS [   ], DIZZYNESS [   ]  RS: COUGH [   ], SPUTUM [   ]  GI: ABDOMINAL PAIN [   ], NAUSEA [   ], VOMITINGS [   ], DIARRHEA [   ], CONSTIPATION [   ]  :  DYSURIA [   ], NOCTURIA [   ], INCREASED FREQUENCY [   ], DRIBLING [   ],  SKELETAL: PAINFUL JOINTS [   ], SWOLLEN JOINTS [   ], NECK ACHE [   ], LOW BACK ACHE [   ],  SKIN : ULCERS [   ], RASH [   ], ITCHING [   ]  CNS: HEAD ACHE [   ], DOUBLE VISION [   ], BLURRED VISION [   ], AMS / CONFUSION [   ], SEIZURES [   ], WEAKNESS [   ],TINGLING / NUMBNESS [   ]    PHYSICAL EXAMINATION:  GENERAL APPEARANCE: NO DISTRESS  HEENT:  NO PALLOR, NO  JVD,  NO   NODES, NECK SUPPLE  CVS: S1 +, S2 +,   RS: AEEB,  OCCASIONAL  RALES +,   NO RONCHI  ABD: SOFT, NO, BS +  ; TTP OVER SUPRAPUBIC AREA+  EXT: NO PE  SKIN: WARM,   SKELETAL:  REDUCE ROM OF CERVICAL AND LUMBOSACRAL SPINE  CNS:  AAO X 1    RADIOLOGY :    ACC: [id number] EXAM:  CT ANGIO ABD PELV (W)AW IC   ORDERED BY: JACK HAWKINS     IMPRESSION:  Acute sigmoid diverticulitis with suggestion of a fistulous connection   with the urinary bladder as described above. There is marked wall   thickening of the redundant distal sigmoid colon. Ischemia and/or   underlying neoplasm should be considered. Please note that active GI   bleed cannot be excluded on this noncontrast study.    Markedly distended gallbladder. No calcified gallstones. Abdominal   ultrasound suggested for further evaluation.      ASSESSMENT :     Gastrointestinal hemorrhage    No pertinent past medical history    Hypothyroidism    HTN (hypertension)    HLD (hyperlipidemia)    Glaucoma    GERD (gastroesophageal reflux disease)    BPH (benign prostatic hyperplasia)    Prostate cancer    No significant past surgical history        PLAN:  HPI:  Patient is 89 years old male from [facility] with past medical history of hypertension, hyperlipidemia. glaucoma, GERD, BPH, hypothyroidism, and prostate cancer, baseline A&Ox1 who was transferred due to rectal bleeding. His BP was around 85/50. Patient also has elevated WBCs and elevated lactate. Patient was admitted in April with suspected bacteremia and dysphagia for which he had esophagogram that showed Mildly dilated proximal and midesophagus with mild narrowing of the distal esophagus with mild irregularity of esophageal wall. In ED, Patient received 1L IV bolus of NS. DDAVPx1 and started on PPI drip. Patient doesn't seem to have chest pain, abdominal pain or vomiting or shortness of breath.    REVIEW OF SYSTEMS:  Limited due to mental status      (16 May 2023 01:40)    # PROGNOSIS IS POOR.  # [] - CASE DISCUSSED AT LENGTH WITH MANA BALL @ [phone] [ OF MARIA INES BALL, PATIENT'S  ?COUSIN] - ALL QUESTIONS ANSWERED. HE EXPRESSED THAT PATIENT DOES NOT HAVE ANY OTHER RELATIVES OR FRIENDS WHO ARE INVOLVED IN HIS CARE. HE IS AGREEABLE TO SERVE AS PATIENT'S SURROGATE DECISION MAKER GIVEN THAT PATIENT DOES NOT HAVE MEDICAL CAPACITY FOR DECISION MAKING [GIVEN DEMENTIA].     D/W MR. BALL THAT PATIENT'S PROGNOSIS IS POOR GIVEN CURRENT CLINICAL CONDITION - SEPSIS S/T UNDERLYING ACUTE DIVERTICULITIS, UTI - COMPLICATED BY COLOVESICULAR FISTULA. DISCUSSED THAT PATIENT IS HIGH SURGICAL RISK AND THAT HE HAS HAD [PER REVIEW] DECLINING FUNCTIONAL STATUS. MR. BALL EXPRESSED UNDERSTANDING AND THAT PATIENT WOULD LIKELY HAVE WISHED TO BE KEPT COMFORTABLE IN THIS CIRCUMSTANCE AND WOULD DEFER AGGRESSIVE MEDICAL INTERVENTIONS. HE ADDRESSED THAT HE WISHES FOR GOC OF DNR/DNI/COMFORT CARE [MOLST FILLED] AND IS AGREEABLE FOR RETURNING TO [facility] WITH HOSPICE.    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

## 2023-05-17 NOTE — PROGRESS NOTE ADULT - SUBJECTIVE AND OBJECTIVE BOX
INTERVAL HPI/OVERNIGHT EVENTS:  Seen and examined at bedside.   Pt resting comfortably.     MEDICATIONS  (STANDING):  ascorbic acid 500 milliGRAM(s) Oral daily  finasteride 5 milliGRAM(s) Oral daily  levothyroxine 25 MICROGram(s) Oral daily  pantoprazole  Injectable 40 milliGRAM(s) IV Push two times a day  piperacillin/tazobactam IVPB.. 3.375 Gram(s) IV Intermittent every 8 hours  simvastatin 20 milliGRAM(s) Oral at bedtime  sodium chloride 0.9%. 1000 milliLiter(s) (100 mL/Hr) IV Continuous <Continuous>  tamsulosin 0.4 milliGRAM(s) Oral at bedtime    MEDICATIONS  (PRN):      Vital Signs Last 24 Hrs  T(C): 36.4 (17 May 2023 04:45), Max: 36.4 (16 May 2023 12:41)  T(F): 97.6 (17 May 2023 04:45), Max: 97.6 (16 May 2023 12:41)  HR: 96 (17 May 2023 04:45) (83 - 98)  BP: 103/50 (17 May 2023 04:45) (103/50 - 148/86)  BP(mean): --  RR: 20 (17 May 2023 04:45) (20 - 20)  SpO2: 100% (17 May 2023 04:45) (99% - 100%)    Parameters below as of 17 May 2023 04:45  Patient On (Oxygen Delivery Method): room air        Physical:  General: NAD  Respirations: Unlabored   Abdomen: Soft nondistended, grimacing with deep palpation, no diffuse peritonitis     I&O's Detail      LABS:                        8.7    13.08 )-----------( 369      ( 17 May 2023 06:46 )             30.3             05-17    139  |  114<H>  |  7   ----------------------------<  91  3.6   |  19<L>  |  0.64    Ca    8.2<L>      17 May 2023 06:46  Phos  2.9     05-17  Mg     2.0     05-17    TPro  5.2<L>  /  Alb  1.2<L>  /  TBili  0.5  /  DBili  x   /  AST  10  /  ALT  9<L>  /  AlkPhos  84  05-17

## 2023-05-17 NOTE — PROGRESS NOTE ADULT - ASSESSMENT
Acute Sigmoid Diverticulitis with possible fistula to bladder  Leukocytosis      Plan - Cont Zosyn 3.375 gms iv q8hrs  consider putting in a hendrickson to decompress his bladder

## 2023-05-17 NOTE — PROGRESS NOTE ADULT - CONVERSATION DETAILS
CASE DISCUSSED AT LENGTH WITH MANA BALL @ [phone] [ OF MARIA INES BALL, PATIENT'S  ?COUSIN] - ALL QUESTIONS ANSWERED. HE EXPRESSED THAT PATIENT DOES NOT HAVE ANY OTHER RELATIVES OR FRIENDS WHO ARE INVOLVED IN HIS CARE. HE IS AGREEABLE TO SERVE AS PATIENT'S SURROGATE DECISION MAKER GIVEN THAT PATIENT DOES NOT HAVE MEDICAL CAPACITY FOR DECISION MAKING [GIVEN DEMENTIA].     D/W MR. BALL THAT PATIENT'S PROGNOSIS IS POOR GIVEN CURRENT CLINICAL CONDITION - SEPSIS S/T UNDERLYING ACUTE DIVERTICULITIS, UTI - COMPLICATED BY COLOVESICULAR FISTULA. DISCUSSED THAT PATIENT IS HIGH SURGICAL RISK AND THAT HE HAS HAD [PER REVIEW] DECLINING FUNCTIONAL STATUS. MR. BALL EXPRESSED UNDERSTANDING AND THAT PATIENT WOULD LIKELY HAVE WISHED TO BE KEPT COMFORTABLE IN THIS CIRCUMSTANCE AND WOULD DEFER AGGRESSIVE MEDICAL INTERVENTIONS. HE ADDRESSED THAT HE WISHES FOR GOC OF DNR/DNI/COMFORT CARE [MOLST FILLED] AND IS AGREEABLE FOR RETURNING TO [facility] WITH HOSPICE.

## 2023-05-17 NOTE — PROGRESS NOTE ADULT - SUBJECTIVE AND OBJECTIVE BOX
PGY-1 Progress Note discussed with attending    PLEASE CONTACT ON CALL TEAM:  - On Call Team (Please refer to Clarissa) FROM 5:00 PM - 8:30PM  - Nightfloat Team FROM 8:30 -7:30 AM    CHIEF COMPLAINT & BRIEF HOSPITAL COURSE:   Patient is 89 years old male from [facility] with past medical history of hypertension, hyperlipidemia. glaucoma, GERD, BPH, hypothyroidism, and prostate cancer, baseline A&Ox1 who was transferred due to rectal bleeding. His BP was around 85/50. Patient also has elevated WBCs and elevated lactate. In ED, Patient received 1L IV bolus of NS. DDAVPx1 and started on PPI drip. 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 Admitted for further management     INTERVAL HPI/OVERNIGHT EVENTS: No acute events overnight. Pt A&Ox0, unable to answer my questions, just moaning in bed, wetting himself with notable stool particles in his urine. Plan to discuss goals of care with family. NPO for now.     MEDICATIONS  (STANDING):  ascorbic acid 500 milliGRAM(s) Oral daily  finasteride 5 milliGRAM(s) Oral daily  levothyroxine 25 MICROGram(s) Oral daily  pantoprazole  Injectable 40 milliGRAM(s) IV Push two times a day  piperacillin/tazobactam IVPB.. 3.375 Gram(s) IV Intermittent every 8 hours  simvastatin 20 milliGRAM(s) Oral at bedtime  sodium chloride 0.9%. 1000 milliLiter(s) (100 mL/Hr) IV Continuous <Continuous>  tamsulosin 0.4 milliGRAM(s) Oral at bedtime    MEDICATIONS  (PRN):      REVIEW OF SYSTEMS: unable to obtain     Vital Signs Last 24 Hrs  T(C): 36.5 (17 May 2023 13:53), Max: 36.5 (17 May 2023 13:53)  T(F): 97.7 (17 May 2023 13:53), Max: 97.7 (17 May 2023 13:53)  HR: 107 (17 May 2023 13:53) (96 - 107)  BP: 174/77 (17 May 2023 13:53) (103/50 - 174/77)  BP(mean): --  RR: 20 (17 May 2023 13:53) (20 - 20)  SpO2: 100% (17 May 2023 13:53) (100% - 100%)    Parameters below as of 17 May 2023 13:53  Patient On (Oxygen Delivery Method): room air        PHYSICAL EXAMINATION:  GENERAL: NAD, well built  HEAD:  Atraumatic, Normocephalic  EYES:  conjunctiva and sclera clear  NECK: Supple, No JVD, Normal thyroid  CHEST/LUNG: Clear to auscultation. Clear to percussion bilaterally; No rales, rhonchi, wheezing, or rubs  HEART: Regular rate and rhythm; No murmurs, rubs, or gallops  ABDOMEN: Soft, (+) slight tenderness to palpation of the abdomen in b/l lower quadrants, Nondistended; Bowel sounds present  NERVOUS SYSTEM:  Alert & Oriented X0    EXTREMITIES:  2+ Peripheral Pulses, No clubbing, cyanosis, or edema  SKIN: warm dry                          8.7    13.08 )-----------( 369      ( 17 May 2023 06:46 )             30.3     05-17    139  |  114<H>  |  7   ----------------------------<  91  3.6   |  19<L>  |  0.64    Ca    8.2<L>      17 May 2023 06:46  Phos  2.9     05-17  Mg     2.0     05-17    TPro  5.2<L>  /  Alb  1.2<L>  /  TBili  0.5  /  DBili  x   /  AST  10  /  ALT  9<L>  /  AlkPhos  84  05-17    LIVER FUNCTIONS - ( 17 May 2023 06:46 )  Alb: 1.2 g/dL / Pro: 5.2 g/dL / ALK PHOS: 84 U/L / ALT: 9 U/L DA / AST: 10 U/L / GGT: x               PT/INR - ( 15 May 2023 22:43 )   PT: 17.1 sec;   INR: 1.43 ratio         PTT - ( 15 May 2023 22:43 )  PTT:37.0 sec    CAPILLARY BLOOD GLUCOSE      RADIOLOGY & ADDITIONAL TESTS:

## 2023-05-17 NOTE — PROGRESS NOTE ADULT - ASSESSMENT
89M from nursing home admitted to medicine service for management of GI bleed found to have acute diverticulitis of the sigmoid colon.  CT showing air in the bladder and bilateral ureters concerning for possible colovesicular fistula.   -NPO, IVF  -F/u GOC   -Abx per ID   -Care per primary team   -No acute surgical intervention planned at this time, pt poor surgical candidate, pending discussing with family for GOC   -Discussed with attending

## 2023-05-18 LAB
-  AMIKACIN: SIGNIFICANT CHANGE UP
-  AMIKACIN: SIGNIFICANT CHANGE UP
-  AMOXICILLIN/CLAVULANIC ACID: SIGNIFICANT CHANGE UP
-  AMOXICILLIN/CLAVULANIC ACID: SIGNIFICANT CHANGE UP
-  AMPICILLIN/SULBACTAM: SIGNIFICANT CHANGE UP
-  AMPICILLIN/SULBACTAM: SIGNIFICANT CHANGE UP
-  AMPICILLIN: SIGNIFICANT CHANGE UP
-  AMPICILLIN: SIGNIFICANT CHANGE UP
-  AZTREONAM: SIGNIFICANT CHANGE UP
-  AZTREONAM: SIGNIFICANT CHANGE UP
-  CEFAZOLIN: SIGNIFICANT CHANGE UP
-  CEFAZOLIN: SIGNIFICANT CHANGE UP
-  CEFEPIME: SIGNIFICANT CHANGE UP
-  CEFEPIME: SIGNIFICANT CHANGE UP
-  CEFOXITIN: SIGNIFICANT CHANGE UP
-  CEFOXITIN: SIGNIFICANT CHANGE UP
-  CEFTRIAXONE: SIGNIFICANT CHANGE UP
-  CEFTRIAXONE: SIGNIFICANT CHANGE UP
-  CEFUROXIME: SIGNIFICANT CHANGE UP
-  CEFUROXIME: SIGNIFICANT CHANGE UP
-  CIPROFLOXACIN: SIGNIFICANT CHANGE UP
-  CIPROFLOXACIN: SIGNIFICANT CHANGE UP
-  ERTAPENEM: SIGNIFICANT CHANGE UP
-  ERTAPENEM: SIGNIFICANT CHANGE UP
-  GENTAMICIN: SIGNIFICANT CHANGE UP
-  GENTAMICIN: SIGNIFICANT CHANGE UP
-  IMIPENEM: SIGNIFICANT CHANGE UP
-  IMIPENEM: SIGNIFICANT CHANGE UP
-  LEVOFLOXACIN: SIGNIFICANT CHANGE UP
-  LEVOFLOXACIN: SIGNIFICANT CHANGE UP
-  MEROPENEM: SIGNIFICANT CHANGE UP
-  MEROPENEM: SIGNIFICANT CHANGE UP
-  NITROFURANTOIN: SIGNIFICANT CHANGE UP
-  NITROFURANTOIN: SIGNIFICANT CHANGE UP
-  PIPERACILLIN/TAZOBACTAM: SIGNIFICANT CHANGE UP
-  PIPERACILLIN/TAZOBACTAM: SIGNIFICANT CHANGE UP
-  TOBRAMYCIN: SIGNIFICANT CHANGE UP
-  TOBRAMYCIN: SIGNIFICANT CHANGE UP
-  TRIMETHOPRIM/SULFAMETHOXAZOLE: SIGNIFICANT CHANGE UP
-  TRIMETHOPRIM/SULFAMETHOXAZOLE: SIGNIFICANT CHANGE UP
ALBUMIN SERPL ELPH-MCNC: 1.1 G/DL — LOW (ref 3.5–5)
ALP SERPL-CCNC: 94 U/L — SIGNIFICANT CHANGE UP (ref 40–120)
ALT FLD-CCNC: 10 U/L DA — SIGNIFICANT CHANGE UP (ref 10–60)
ANION GAP SERPL CALC-SCNC: 8 MMOL/L — SIGNIFICANT CHANGE UP (ref 5–17)
AST SERPL-CCNC: 17 U/L — SIGNIFICANT CHANGE UP (ref 10–40)
BASOPHILS # BLD AUTO: 0.03 K/UL — SIGNIFICANT CHANGE UP (ref 0–0.2)
BASOPHILS NFR BLD AUTO: 0.3 % — SIGNIFICANT CHANGE UP (ref 0–2)
BILIRUB SERPL-MCNC: 0.5 MG/DL — SIGNIFICANT CHANGE UP (ref 0.2–1.2)
BUN SERPL-MCNC: 8 MG/DL — SIGNIFICANT CHANGE UP (ref 7–18)
CALCIUM SERPL-MCNC: 8.2 MG/DL — LOW (ref 8.4–10.5)
CHLORIDE SERPL-SCNC: 113 MMOL/L — HIGH (ref 96–108)
CO2 SERPL-SCNC: 18 MMOL/L — LOW (ref 22–31)
CREAT SERPL-MCNC: 0.59 MG/DL — SIGNIFICANT CHANGE UP (ref 0.5–1.3)
CULTURE RESULTS: SIGNIFICANT CHANGE UP
EGFR: 92 ML/MIN/1.73M2 — SIGNIFICANT CHANGE UP
EOSINOPHIL # BLD AUTO: 0.02 K/UL — SIGNIFICANT CHANGE UP (ref 0–0.5)
EOSINOPHIL NFR BLD AUTO: 0.2 % — SIGNIFICANT CHANGE UP (ref 0–6)
GLUCOSE SERPL-MCNC: 75 MG/DL — SIGNIFICANT CHANGE UP (ref 70–99)
HCT VFR BLD CALC: 31.7 % — LOW (ref 39–50)
HGB BLD-MCNC: 9.5 G/DL — LOW (ref 13–17)
IMM GRANULOCYTES NFR BLD AUTO: 2.2 % — HIGH (ref 0–0.9)
LYMPHOCYTES # BLD AUTO: 0.85 K/UL — LOW (ref 1–3.3)
LYMPHOCYTES # BLD AUTO: 7.6 % — LOW (ref 13–44)
MAGNESIUM SERPL-MCNC: 2.2 MG/DL — SIGNIFICANT CHANGE UP (ref 1.6–2.6)
MCHC RBC-ENTMCNC: 25.3 PG — LOW (ref 27–34)
MCHC RBC-ENTMCNC: 30 GM/DL — LOW (ref 32–36)
MCV RBC AUTO: 84.5 FL — SIGNIFICANT CHANGE UP (ref 80–100)
METHOD TYPE: SIGNIFICANT CHANGE UP
METHOD TYPE: SIGNIFICANT CHANGE UP
MONOCYTES # BLD AUTO: 0.62 K/UL — SIGNIFICANT CHANGE UP (ref 0–0.9)
MONOCYTES NFR BLD AUTO: 5.6 % — SIGNIFICANT CHANGE UP (ref 2–14)
NEUTROPHILS # BLD AUTO: 9.38 K/UL — HIGH (ref 1.8–7.4)
NEUTROPHILS NFR BLD AUTO: 84.1 % — HIGH (ref 43–77)
NRBC # BLD: 0 /100 WBCS — SIGNIFICANT CHANGE UP (ref 0–0)
ORGANISM # SPEC MICROSCOPIC CNT: SIGNIFICANT CHANGE UP
PHOSPHATE SERPL-MCNC: 2.6 MG/DL — SIGNIFICANT CHANGE UP (ref 2.5–4.5)
PLATELET # BLD AUTO: 373 K/UL — SIGNIFICANT CHANGE UP (ref 150–400)
POTASSIUM SERPL-MCNC: 4.1 MMOL/L — SIGNIFICANT CHANGE UP (ref 3.5–5.3)
POTASSIUM SERPL-SCNC: 4.1 MMOL/L — SIGNIFICANT CHANGE UP (ref 3.5–5.3)
PROT SERPL-MCNC: 5.2 G/DL — LOW (ref 6–8.3)
RBC # BLD: 3.75 M/UL — LOW (ref 4.2–5.8)
RBC # FLD: 17.3 % — HIGH (ref 10.3–14.5)
SODIUM SERPL-SCNC: 139 MMOL/L — SIGNIFICANT CHANGE UP (ref 135–145)
SPECIMEN SOURCE: SIGNIFICANT CHANGE UP
UUN UR-MCNC: 225 MG/DL — SIGNIFICANT CHANGE UP
WBC # BLD: 11.14 K/UL — HIGH (ref 3.8–10.5)
WBC # FLD AUTO: 11.14 K/UL — HIGH (ref 3.8–10.5)

## 2023-05-18 RX ORDER — MORPHINE SULFATE 50 MG/1
1 CAPSULE, EXTENDED RELEASE ORAL EVERY 6 HOURS
Refills: 0 | Status: DISCONTINUED | OUTPATIENT
Start: 2023-05-18 | End: 2023-05-20

## 2023-05-18 RX ADMIN — MORPHINE SULFATE 1 MILLIGRAM(S): 50 CAPSULE, EXTENDED RELEASE ORAL at 18:05

## 2023-05-18 RX ADMIN — TAMSULOSIN HYDROCHLORIDE 0.4 MILLIGRAM(S): 0.4 CAPSULE ORAL at 21:17

## 2023-05-18 RX ADMIN — MORPHINE SULFATE 1 MILLIGRAM(S): 50 CAPSULE, EXTENDED RELEASE ORAL at 17:25

## 2023-05-18 RX ADMIN — SIMVASTATIN 20 MILLIGRAM(S): 20 TABLET, FILM COATED ORAL at 21:17

## 2023-05-18 RX ADMIN — PIPERACILLIN AND TAZOBACTAM 25 GRAM(S): 4; .5 INJECTION, POWDER, LYOPHILIZED, FOR SOLUTION INTRAVENOUS at 21:16

## 2023-05-18 RX ADMIN — PANTOPRAZOLE SODIUM 40 MILLIGRAM(S): 20 TABLET, DELAYED RELEASE ORAL at 05:39

## 2023-05-18 RX ADMIN — SODIUM CHLORIDE 100 MILLILITER(S): 9 INJECTION INTRAMUSCULAR; INTRAVENOUS; SUBCUTANEOUS at 05:37

## 2023-05-18 RX ADMIN — PIPERACILLIN AND TAZOBACTAM 25 GRAM(S): 4; .5 INJECTION, POWDER, LYOPHILIZED, FOR SOLUTION INTRAVENOUS at 14:15

## 2023-05-18 RX ADMIN — PANTOPRAZOLE SODIUM 40 MILLIGRAM(S): 20 TABLET, DELAYED RELEASE ORAL at 17:25

## 2023-05-18 RX ADMIN — FINASTERIDE 5 MILLIGRAM(S): 5 TABLET, FILM COATED ORAL at 11:12

## 2023-05-18 RX ADMIN — Medication 500 MILLIGRAM(S): at 11:12

## 2023-05-18 RX ADMIN — MORPHINE SULFATE 1 MILLIGRAM(S): 50 CAPSULE, EXTENDED RELEASE ORAL at 14:08

## 2023-05-18 RX ADMIN — MORPHINE SULFATE 1 MILLIGRAM(S): 50 CAPSULE, EXTENDED RELEASE ORAL at 13:39

## 2023-05-18 RX ADMIN — Medication 25 MICROGRAM(S): at 05:40

## 2023-05-18 RX ADMIN — PIPERACILLIN AND TAZOBACTAM 25 GRAM(S): 4; .5 INJECTION, POWDER, LYOPHILIZED, FOR SOLUTION INTRAVENOUS at 05:39

## 2023-05-18 NOTE — PROGRESS NOTE ADULT - SUBJECTIVE AND OBJECTIVE BOX
89y Male    Meds:  piperacillin/tazobactam IVPB.. 3.375 Gram(s) IV Intermittent every 8 hours    Allergies    No Known Allergies    Intolerances        VITALS:  Vital Signs Last 24 Hrs  T(C): 36.7 (18 May 2023 13:45), Max: 36.8 (18 May 2023 05:18)  T(F): 98 (18 May 2023 13:45), Max: 98.2 (18 May 2023 05:18)  HR: 105 (18 May 2023 13:45) (101 - 105)  BP: 146/77 (18 May 2023 13:45) (132/52 - 146/77)  BP(mean): --  RR: 20 (18 May 2023 13:45) (20 - 20)  SpO2: 97% (18 May 2023 13:45) (97% - 100%)    Parameters below as of 18 May 2023 13:45  Patient On (Oxygen Delivery Method): room air        LABS/DIAGNOSTIC TESTS:                          9.5    11.14 )-----------( 373      ( 18 May 2023 08:40 )             31.7         05-18    139  |  113<H>  |  8   ----------------------------<  75  4.1   |  18<L>  |  0.59    Ca    8.2<L>      18 May 2023 08:40  Phos  2.6     05-18  Mg     2.2     05-18    TPro  5.2<L>  /  Alb  1.1<L>  /  TBili  0.5  /  DBili  x   /  AST  17  /  ALT  10  /  AlkPhos  94  05-18      LIVER FUNCTIONS - ( 18 May 2023 08:40 )  Alb: 1.1 g/dL / Pro: 5.2 g/dL / ALK PHOS: 94 U/L / ALT: 10 U/L DA / AST: 17 U/L / GGT: x             CULTURES: Clean Catch Clean Catch (Midstream)  05-16 @ 05:27   >100,000 CFU/ml Escherichia coli  --  --      .Blood Blood-Peripheral  05-15 @ 22:25   No growth to date.  --  --      .Blood Blood-Peripheral  05-15 @ 22:10   No growth to date.  --  --              RADIOLOGY:      ROS:  [  ] UNABLE TO ELICIT 89y Male who is stable and is a little more responsive, he is opening his eyes when I talk to him, he is not talking at all, he has no fevers and his WBC count is only marginally elevated. He had a hendrickson placed and has lorrie stool in the hendrickson catheter.    Meds:  piperacillin/tazobactam IVPB.. 3.375 Gram(s) IV Intermittent every 8 hours    Allergies    No Known Allergies    Intolerances        VITALS:  Vital Signs Last 24 Hrs  T(C): 36.7 (18 May 2023 13:45), Max: 36.8 (18 May 2023 05:18)  T(F): 98 (18 May 2023 13:45), Max: 98.2 (18 May 2023 05:18)  HR: 105 (18 May 2023 13:45) (101 - 105)  BP: 146/77 (18 May 2023 13:45) (132/52 - 146/77)  BP(mean): --  RR: 20 (18 May 2023 13:45) (20 - 20)  SpO2: 97% (18 May 2023 13:45) (97% - 100%)    Parameters below as of 18 May 2023 13:45  Patient On (Oxygen Delivery Method): room air        LABS/DIAGNOSTIC TESTS:                          9.5    11.14 )-----------( 373      ( 18 May 2023 08:40 )             31.7         05-18    139  |  113<H>  |  8   ----------------------------<  75  4.1   |  18<L>  |  0.59    Ca    8.2<L>      18 May 2023 08:40  Phos  2.6     05-18  Mg     2.2     05-18    TPro  5.2<L>  /  Alb  1.1<L>  /  TBili  0.5  /  DBili  x   /  AST  17  /  ALT  10  /  AlkPhos  94  05-18      LIVER FUNCTIONS - ( 18 May 2023 08:40 )  Alb: 1.1 g/dL / Pro: 5.2 g/dL / ALK PHOS: 94 U/L / ALT: 10 U/L DA / AST: 17 U/L / GGT: x             CULTURES: Clean Catch Clean Catch (Midstream)  05-16 @ 05:27   >100,000 CFU/ml Escherichia coli  --  --      .Blood Blood-Peripheral  05-15 @ 22:25   No growth to date.  --  --      .Blood Blood-Peripheral  05-15 @ 22:10   No growth to date.  --  --              RADIOLOGY:      ROS:  [ x ] UNABLE TO ELICIT

## 2023-05-18 NOTE — PROGRESS NOTE ADULT - PROBLEM SELECTOR PLAN 2
- Patient brought to ED due to rectal bleeding in [place]  - No more episodes since admission  - Hb 9.5; Baseline 13 >> 8.7  - C/W PPI  - Hold aspirin  - IV fluids 0.9% NS maintainance fluids  - F/u CT abdomen and pelvis IV contrast   - CBC q6  - GI consult for possible colonoscope - Patient brought to ED due to rectal bleeding in [place]  - No more episodes since admission  - Hb 9.5; Baseline 13 >> 8.7 > 9.5   - C/W PPI  - Hold aspirin  - IV fluids 0.9% NS maintenance fluids  - CT abdomen/pelvis w/ iv contrast 5/16- Acute sigmoid diverticulitis with suggestion of a fistulous connection   with the urinary bladder as described above. There is marked wall thickening of the redundant distal sigmoid colon. Ischemia and/or underlying neoplasm should be considered.  - surgery consulted- do not recommend surgical intervention

## 2023-05-18 NOTE — PROGRESS NOTE ADULT - SUBJECTIVE AND OBJECTIVE BOX
Patient is a 89y old  Male who presents with a chief complaint of Lower GI bleed (18 May 2023 08:22)    PATIENT IS SEEN AND EXAMINED IN MEDICAL FLOOR.  JOANNT [    ]    IVETT [   ]      GT [   ]    ALLERGIES:  No Known Allergies      Daily     Daily     VITALS:    Vital Signs Last 24 Hrs  T(C): 36.8 (18 May 2023 05:18), Max: 36.8 (18 May 2023 05:18)  T(F): 98.2 (18 May 2023 05:18), Max: 98.2 (18 May 2023 05:18)  HR: 101 (18 May 2023 05:18) (101 - 107)  BP: 132/52 (18 May 2023 05:18) (132/52 - 174/77)  BP(mean): --  RR: 20 (18 May 2023 05:18) (20 - 20)  SpO2: 97% (18 May 2023 05:18) (97% - 100%)    Parameters below as of 18 May 2023 05:18  Patient On (Oxygen Delivery Method): room air        LABS:    CBC Full  -  ( 18 May 2023 08:40 )  WBC Count : 11.14 K/uL  RBC Count : 3.75 M/uL  Hemoglobin : 9.5 g/dL  Hematocrit : 31.7 %  Platelet Count - Automated : 373 K/uL  Mean Cell Volume : 84.5 fl  Mean Cell Hemoglobin : 25.3 pg  Mean Cell Hemoglobin Concentration : 30.0 gm/dL  Auto Neutrophil # : 9.38 K/uL  Auto Lymphocyte # : 0.85 K/uL  Auto Monocyte # : 0.62 K/uL  Auto Eosinophil # : 0.02 K/uL  Auto Basophil # : 0.03 K/uL  Auto Neutrophil % : 84.1 %  Auto Lymphocyte % : 7.6 %  Auto Monocyte % : 5.6 %  Auto Eosinophil % : 0.2 %  Auto Basophil % : 0.3 %      -17    139  |  114<H>  |  7   ----------------------------<  91  3.6   |  19<L>  |  0.64    Ca    8.2<L>      17 May 2023 06:46  Phos  2.9     05-17  Mg     2.0     05-17    TPro  5.2<L>  /  Alb  1.2<L>  /  TBili  0.5  /  DBili  x   /  AST  10  /  ALT  9<L>  /  AlkPhos  84  05-17    CAPILLARY BLOOD GLUCOSE            LIVER FUNCTIONS - ( 17 May 2023 06:46 )  Alb: 1.2 g/dL / Pro: 5.2 g/dL / ALK PHOS: 84 U/L / ALT: 9 U/L DA / AST: 10 U/L / GGT: x           Creatinine Trend: 0.64<--, 0.82<--, 0.93<--  I&O's Summary          Clean Catch Clean Catch (Midstream)   @ 05:27   >100,000 CFU/ml Gram Negative Rods  --  --      .Blood Blood-Peripheral  05-15 @ 22:25   No growth to date.  --  --      .Blood Blood-Peripheral  05-15 @ 22:10   No growth to date.  --  --      .Urine   @ 14:55   No growth  --  --      .Blood   @ 13:45   No Growth Final  --  --          MEDICATIONS:    MEDICATIONS  (STANDING):  ascorbic acid 500 milliGRAM(s) Oral daily  finasteride 5 milliGRAM(s) Oral daily  levothyroxine 25 MICROGram(s) Oral daily  pantoprazole  Injectable 40 milliGRAM(s) IV Push two times a day  piperacillin/tazobactam IVPB.. 3.375 Gram(s) IV Intermittent every 8 hours  simvastatin 20 milliGRAM(s) Oral at bedtime  sodium chloride 0.9%. 1000 milliLiter(s) (100 mL/Hr) IV Continuous <Continuous>  tamsulosin 0.4 milliGRAM(s) Oral at bedtime      MEDICATIONS  (PRN):      REVIEW OF SYSTEMS:                           ALL ROS DONE [ X   ]    CONSTITUTIONAL:  LETHARGIC [   ], FEVER [   ], UNRESPONSIVE [   ]  CVS:  CP  [   ], SOB, [   ], PALPITATIONS [   ], DIZZYNESS [   ]  RS: COUGH [   ], SPUTUM [   ]  GI: ABDOMINAL PAIN [   ], NAUSEA [   ], VOMITINGS [   ], DIARRHEA [   ], CONSTIPATION [   ]  :  DYSURIA [   ], NOCTURIA [   ], INCREASED FREQUENCY [   ], DRIBLING [   ],  SKELETAL: PAINFUL JOINTS [   ], SWOLLEN JOINTS [   ], NECK ACHE [   ], LOW BACK ACHE [   ],  SKIN : ULCERS [   ], RASH [   ], ITCHING [   ]  CNS: HEAD ACHE [   ], DOUBLE VISION [   ], BLURRED VISION [   ], AMS / CONFUSION [   ], SEIZURES [   ], WEAKNESS [   ],TINGLING / NUMBNESS [   ]    PHYSICAL EXAMINATION:  GENERAL APPEARANCE: NO DISTRESS  HEENT:  NO PALLOR, NO  JVD,  NO   NODES, NECK SUPPLE  CVS: S1 +, S2 +,   RS: AEEB,  OCCASIONAL  RALES +,   NO RONCHI  ABD: SOFT, NO, BS +  ; TTP OVER SUPRAPUBIC AREA+  EXT: NO PE  SKIN: WARM,   SKELETAL:  REDUCE ROM OF CERVICAL AND LUMBOSACRAL SPINE  CNS:  AAO X 1    RADIOLOGY :    ACC: [id number] EXAM:  CT ANGIO ABD PELV (W)AW IC   ORDERED BY: JACK HAWKINS     IMPRESSION:  Acute sigmoid diverticulitis with suggestion of a fistulous connection   with the urinary bladder as described above. There is marked wall   thickening of the redundant distal sigmoid colon. Ischemia and/or   underlying neoplasm should be considered. Please note that active GI   bleed cannot be excluded on this noncontrast study.    Markedly distended gallbladder. No calcified gallstones. Abdominal   ultrasound suggested for further evaluation.      ASSESSMENT :     Gastrointestinal hemorrhage    No pertinent past medical history    Hypothyroidism    HTN (hypertension)    HLD (hyperlipidemia)    Glaucoma    GERD (gastroesophageal reflux disease)    BPH (benign prostatic hyperplasia)    Prostate cancer    No significant past surgical history        PLAN:  HPI:  Patient is 89 years old male from [facility] with past medical history of hypertension, hyperlipidemia. glaucoma, GERD, BPH, hypothyroidism, and prostate cancer, baseline A&Ox1 who was transferred due to rectal bleeding. His BP was around 85/50. Patient also has elevated WBCs and elevated lactate. Patient was admitted in April with suspected bacteremia and dysphagia for which he had esophagogram that showed Mildly dilated proximal and midesophagus with mild narrowing of the distal esophagus with mild irregularity of esophageal wall. In ED, Patient received 1L IV bolus of NS. DDAVPx1 and started on PPI drip. Patient doesn't seem to have chest pain, abdominal pain or vomiting or shortness of breath.    REVIEW OF SYSTEMS:  Limited due to mental status      (16 May 2023 01:40)    # PROGNOSIS IS POOR.  # [] - CASE DISCUSSED AT LENGTH WITH MANA BALL @ [phone] [ OF MARIA INES BALL, PATIENT'S  ?COUSIN] - ALL QUESTIONS ANSWERED. HE EXPRESSED THAT PATIENT DOES NOT HAVE ANY OTHER RELATIVES OR FRIENDS WHO ARE INVOLVED IN HIS CARE. HE IS AGREEABLE TO SERVE AS PATIENT'S SURROGATE DECISION MAKER GIVEN THAT PATIENT DOES NOT HAVE MEDICAL CAPACITY FOR DECISION MAKING [GIVEN DEMENTIA].     D/W MR. BALL THAT PATIENT'S PROGNOSIS IS POOR GIVEN CURRENT CLINICAL CONDITION - SEPSIS S/T UNDERLYING ACUTE DIVERTICULITIS, UTI - COMPLICATED BY COLOVESICULAR FISTULA. DISCUSSED THAT PATIENT IS HIGH SURGICAL RISK AND THAT HE HAS HAD [PER REVIEW] DECLINING FUNCTIONAL STATUS. MR. BALL EXPRESSED UNDERSTANDING AND THAT PATIENT WOULD LIKELY HAVE WISHED TO BE KEPT COMFORTABLE IN THIS CIRCUMSTANCE AND WOULD DEFER AGGRESSIVE MEDICAL INTERVENTIONS. HE ADDRESSED THAT HE WISHES FOR GOC OF DNR/DNI/COMFORT CARE [MOLST FILLED] AND IS AGREEABLE FOR RETURNING TO [facility] WITH HOSPICE.    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    Patient is a 89y old  Male who presents with a chief complaint of Lower GI bleed (18 May 2023 08:22)    PATIENT IS SEEN AND EXAMINED IN MEDICAL FLOOR.    ALLERGIES:  No Known Allergies    VITALS:    Vital Signs Last 24 Hrs  T(C): 36.8 (18 May 2023 05:18), Max: 36.8 (18 May 2023 05:18)  T(F): 98.2 (18 May 2023 05:18), Max: 98.2 (18 May 2023 05:18)  HR: 101 (18 May 2023 05:18) (101 - 107)  BP: 132/52 (18 May 2023 05:18) (132/52 - 174/77)  BP(mean): --  RR: 20 (18 May 2023 05:18) (20 - 20)  SpO2: 97% (18 May 2023 05:18) (97% - 100%)    Parameters below as of 18 May 2023 05:18  Patient On (Oxygen Delivery Method): room air        LABS:    CBC Full  -  ( 18 May 2023 08:40 )  WBC Count : 11.14 K/uL  RBC Count : 3.75 M/uL  Hemoglobin : 9.5 g/dL  Hematocrit : 31.7 %  Platelet Count - Automated : 373 K/uL  Mean Cell Volume : 84.5 fl  Mean Cell Hemoglobin : 25.3 pg  Mean Cell Hemoglobin Concentration : 30.0 gm/dL  Auto Neutrophil # : 9.38 K/uL  Auto Lymphocyte # : 0.85 K/uL  Auto Monocyte # : 0.62 K/uL  Auto Eosinophil # : 0.02 K/uL  Auto Basophil # : 0.03 K/uL  Auto Neutrophil % : 84.1 %  Auto Lymphocyte % : 7.6 %  Auto Monocyte % : 5.6 %  Auto Eosinophil % : 0.2 %  Auto Basophil % : 0.3 %          139  |  114<H>  |  7   ----------------------------<  91  3.6   |  19<L>  |  0.64    Ca    8.2<L>      17 May 2023 06:46  Phos  2.9     05-  Mg     2.0     -    TPro  5.2<L>  /  Alb  1.2<L>  /  TBili  0.5  /  DBili  x   /  AST  10  /  ALT  9<L>  /  AlkPhos  84  -17    CAPILLARY BLOOD GLUCOSE            LIVER FUNCTIONS - ( 17 May 2023 06:46 )  Alb: 1.2 g/dL / Pro: 5.2 g/dL / ALK PHOS: 84 U/L / ALT: 9 U/L DA / AST: 10 U/L / GGT: x           Creatinine Trend: 0.64<--, 0.82<--, 0.93<--  I&O's Summary          Clean Catch Clean Catch (Midstream)   @ 05:27   >100,000 CFU/ml Gram Negative Rods  --  --      .Blood Blood-Peripheral  05-15 @ 22:25   No growth to date.  --  --      .Blood Blood-Peripheral  05-15 @ 22:10   No growth to date.  --  --      .Urine   @ 14:55   No growth  --  --      .Blood   @ 13:45   No Growth Final  --  --          MEDICATIONS:    MEDICATIONS  (STANDING):  ascorbic acid 500 milliGRAM(s) Oral daily  finasteride 5 milliGRAM(s) Oral daily  levothyroxine 25 MICROGram(s) Oral daily  pantoprazole  Injectable 40 milliGRAM(s) IV Push two times a day  piperacillin/tazobactam IVPB.. 3.375 Gram(s) IV Intermittent every 8 hours  simvastatin 20 milliGRAM(s) Oral at bedtime  sodium chloride 0.9%. 1000 milliLiter(s) (100 mL/Hr) IV Continuous <Continuous>  tamsulosin 0.4 milliGRAM(s) Oral at bedtime      MEDICATIONS  (PRN):      REVIEW OF SYSTEMS:                           ALL ROS DONE [ X   ]    CONSTITUTIONAL:  LETHARGIC [   ], FEVER [   ], UNRESPONSIVE [   ]  CVS:  CP  [   ], SOB, [   ], PALPITATIONS [   ], DIZZYNESS [   ]  RS: COUGH [   ], SPUTUM [   ]  GI: ABDOMINAL PAIN [   ], NAUSEA [   ], VOMITINGS [   ], DIARRHEA [   ], CONSTIPATION [   ]  :  DYSURIA [   ], NOCTURIA [   ], INCREASED FREQUENCY [   ], DRIBLING [   ],  SKELETAL: PAINFUL JOINTS [   ], SWOLLEN JOINTS [   ], NECK ACHE [   ], LOW BACK ACHE [   ],  SKIN : ULCERS [   ], RASH [   ], ITCHING [   ]  CNS: HEAD ACHE [   ], DOUBLE VISION [   ], BLURRED VISION [   ], AMS / CONFUSION [   ], SEIZURES [   ], WEAKNESS [   ],TINGLING / NUMBNESS [   ]    PHYSICAL EXAMINATION:  GENERAL APPEARANCE: NO DISTRESS  HEENT:  NO PALLOR, NO  JVD,  NO   NODES, NECK SUPPLE  CVS: S1 +, S2 +,   RS: AEEB,  OCCASIONAL  RALES +,   NO RONCHI  ABD: SOFT, NO, BS +  ; TTP OVER SUPRAPUBIC AREA+  EXT: NO PE  SKIN: WARM,   SKELETAL:  REDUCE ROM OF CERVICAL AND LUMBOSACRAL SPINE  CNS:  AAO X 1    RADIOLOGY :    ACC: [id number] EXAM:  CT ANGIO ABD PELV (W)AW IC   ORDERED BY: JACK HAWKINS     IMPRESSION:  Acute sigmoid diverticulitis with suggestion of a fistulous connection   with the urinary bladder as described above. There is marked wall   thickening of the redundant distal sigmoid colon. Ischemia and/or   underlying neoplasm should be considered. Please note that active GI   bleed cannot be excluded on this noncontrast study.    Markedly distended gallbladder. No calcified gallstones. Abdominal   ultrasound suggested for further evaluation.      ASSESSMENT :     Gastrointestinal hemorrhage    No pertinent past medical history    Hypothyroidism    HTN (hypertension)    HLD (hyperlipidemia)    Glaucoma    GERD (gastroesophageal reflux disease)    BPH (benign prostatic hyperplasia)    Prostate cancer    No significant past surgical history        PLAN:  HPI:  Patient is 89 years old male from [facility] with past medical history of hypertension, hyperlipidemia. glaucoma, GERD, BPH, hypothyroidism, and prostate cancer, baseline A&Ox1 who was transferred due to rectal bleeding. His BP was around 85/50. Patient also has elevated WBCs and elevated lactate. Patient was admitted in April with suspected bacteremia and dysphagia for which he had esophagogram that showed Mildly dilated proximal and midesophagus with mild narrowing of the distal esophagus with mild irregularity of esophageal wall. In ED, Patient received 1L IV bolus of NS. DDAVPx1 and started on PPI drip. Patient doesn't seem to have chest pain, abdominal pain or vomiting or shortness of breath.    REVIEW OF SYSTEMS:  Limited due to mental status      (16 May 2023 01:40)    # PROGNOSIS IS POOR.  # [] - CASE DISCUSSED AT LENGTH WITH MANA BALL @ [phone] [ OF MARIA INES BALL, PATIENT'S  ?COUSIN] - ALL QUESTIONS ANSWERED. HE EXPRESSED THAT PATIENT DOES NOT HAVE ANY OTHER RELATIVES OR FRIENDS WHO ARE INVOLVED IN HIS CARE. HE IS AGREEABLE TO SERVE AS PATIENT'S SURROGATE DECISION MAKER GIVEN THAT PATIENT DOES NOT HAVE MEDICAL CAPACITY FOR DECISION MAKING [GIVEN DEMENTIA].     D/W MR. BALL THAT PATIENT'S PROGNOSIS IS POOR GIVEN CURRENT CLINICAL CONDITION - SEPSIS S/T UNDERLYING ACUTE DIVERTICULITIS, UTI - COMPLICATED BY COLOVESICULAR FISTULA. DISCUSSED THAT PATIENT IS HIGH SURGICAL RISK AND THAT HE HAS HAD [PER REVIEW] DECLINING FUNCTIONAL STATUS. MR. BALL EXPRESSED UNDERSTANDING AND THAT PATIENT WOULD LIKELY HAVE WISHED TO BE KEPT COMFORTABLE IN THIS CIRCUMSTANCE AND WOULD DEFER AGGRESSIVE MEDICAL INTERVENTIONS. HE ADDRESSED THAT HE WISHES FOR GOC OF DNR/DNI/COMFORT CARE [MOLST FILLED] AND IS AGREEABLE FOR RETURNING TO [facility] WITH HOSPICE.    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  - NOTED CT A/P  - ZOSYN, F/U BCX AND UCX  - PRN PAIN CONTROL  - ID CONSULT  - SURGERY CONSULT  - GI CONSULT    # R/O GI BLEED  - S/P DDAVP  - PPI DRIP  - NPO  - TYPE AND SCREEN  - HOLD A/C  - GI CONSULT    # ELEVATED LACTIC ACID  - TREND LACTIC ACID    # DYSPHAGIA  - NPO  - S/P RECENT GI AND ST EVALUATIONS  - GI CONSULT    # ABNORMAL GALLBLADDER   - F/U GB U/S    # HYPOKALEMIA  - REPLETING WITH SUPPLEMENT    # AMBULATORY DYSFUNCTION S/T OA, OP  - F/U PT EVAL    # LIKELY SEVERE PROTEIN CALORIE MALNUTRITION  - NUTRITIONAL SUPPLEMENT    # HTN  # HLD  # GERD  # BPH, HX OF PROSTATE CA  # HYPOTHYROIDISM  # GLAUCOMA  # VASCULAR DEMENTIA  # GI AND DVT PPX.    Patient is a 89y old  Male who presents with a chief complaint of Lower GI bleed (18 May 2023 08:22)    PATIENT IS SEEN AND EXAMINED IN MEDICAL FLOOR.    ALLERGIES:  No Known Allergies    VITALS:    Vital Signs Last 24 Hrs  T(C): 36.8 (18 May 2023 05:18), Max: 36.8 (18 May 2023 05:18)  T(F): 98.2 (18 May 2023 05:18), Max: 98.2 (18 May 2023 05:18)  HR: 101 (18 May 2023 05:18) (101 - 107)  BP: 132/52 (18 May 2023 05:18) (132/52 - 174/77)  BP(mean): --  RR: 20 (18 May 2023 05:18) (20 - 20)  SpO2: 97% (18 May 2023 05:18) (97% - 100%)    Parameters below as of 18 May 2023 05:18  Patient On (Oxygen Delivery Method): room air        LABS:    CBC Full  -  ( 18 May 2023 08:40 )  WBC Count : 11.14 K/uL  RBC Count : 3.75 M/uL  Hemoglobin : 9.5 g/dL  Hematocrit : 31.7 %  Platelet Count - Automated : 373 K/uL  Mean Cell Volume : 84.5 fl  Mean Cell Hemoglobin : 25.3 pg  Mean Cell Hemoglobin Concentration : 30.0 gm/dL  Auto Neutrophil # : 9.38 K/uL  Auto Lymphocyte # : 0.85 K/uL  Auto Monocyte # : 0.62 K/uL  Auto Eosinophil # : 0.02 K/uL  Auto Basophil # : 0.03 K/uL  Auto Neutrophil % : 84.1 %  Auto Lymphocyte % : 7.6 %  Auto Monocyte % : 5.6 %  Auto Eosinophil % : 0.2 %  Auto Basophil % : 0.3 %          139  |  114<H>  |  7   ----------------------------<  91  3.6   |  19<L>  |  0.64    Ca    8.2<L>      17 May 2023 06:46  Phos  2.9     05-  Mg     2.0     -    TPro  5.2<L>  /  Alb  1.2<L>  /  TBili  0.5  /  DBili  x   /  AST  10  /  ALT  9<L>  /  AlkPhos  84  -17    CAPILLARY BLOOD GLUCOSE            LIVER FUNCTIONS - ( 17 May 2023 06:46 )  Alb: 1.2 g/dL / Pro: 5.2 g/dL / ALK PHOS: 84 U/L / ALT: 9 U/L DA / AST: 10 U/L / GGT: x           Creatinine Trend: 0.64<--, 0.82<--, 0.93<--  I&O's Summary          Clean Catch Clean Catch (Midstream)   @ 05:27   >100,000 CFU/ml Gram Negative Rods  --  --      .Blood Blood-Peripheral  05-15 @ 22:25   No growth to date.  --  --      .Blood Blood-Peripheral  05-15 @ 22:10   No growth to date.  --  --      .Urine   @ 14:55   No growth  --  --      .Blood   @ 13:45   No Growth Final  --  --          MEDICATIONS:    MEDICATIONS  (STANDING):  ascorbic acid 500 milliGRAM(s) Oral daily  finasteride 5 milliGRAM(s) Oral daily  levothyroxine 25 MICROGram(s) Oral daily  pantoprazole  Injectable 40 milliGRAM(s) IV Push two times a day  piperacillin/tazobactam IVPB.. 3.375 Gram(s) IV Intermittent every 8 hours  simvastatin 20 milliGRAM(s) Oral at bedtime  sodium chloride 0.9%. 1000 milliLiter(s) (100 mL/Hr) IV Continuous <Continuous>  tamsulosin 0.4 milliGRAM(s) Oral at bedtime      MEDICATIONS  (PRN):      REVIEW OF SYSTEMS:                           ALL ROS DONE [ X   ]    CONSTITUTIONAL:  LETHARGIC [   ], FEVER [   ], UNRESPONSIVE [   ]  CVS:  CP  [   ], SOB, [   ], PALPITATIONS [   ], DIZZYNESS [   ]  RS: COUGH [   ], SPUTUM [   ]  GI: ABDOMINAL PAIN [   ], NAUSEA [   ], VOMITINGS [   ], DIARRHEA [   ], CONSTIPATION [   ]  :  DYSURIA [   ], NOCTURIA [   ], INCREASED FREQUENCY [   ], DRIBLING [   ],  SKELETAL: PAINFUL JOINTS [   ], SWOLLEN JOINTS [   ], NECK ACHE [   ], LOW BACK ACHE [   ],  SKIN : ULCERS [   ], RASH [   ], ITCHING [   ]  CNS: HEAD ACHE [   ], DOUBLE VISION [   ], BLURRED VISION [   ], AMS / CONFUSION [   ], SEIZURES [   ], WEAKNESS [   ],TINGLING / NUMBNESS [   ]    PHYSICAL EXAMINATION:  GENERAL APPEARANCE: NO DISTRESS  HEENT:  NO PALLOR, NO  JVD,  NO   NODES, NECK SUPPLE  CVS: S1 +, S2 +,   RS: AEEB,  OCCASIONAL  RALES +,   NO RONCHI  ABD: SOFT, NO, BS +  ; TTP OVER SUPRAPUBIC AREA+  EXT: NO PE  SKIN: WARM,   SKELETAL:  REDUCE ROM OF CERVICAL AND LUMBOSACRAL SPINE  CNS:  AAO X 1    RADIOLOGY :    ACC: [id number] EXAM:  CT ANGIO ABD PELV (W)AW IC   ORDERED BY: JACK HAWKINS     IMPRESSION:  Acute sigmoid diverticulitis with suggestion of a fistulous connection   with the urinary bladder as described above. There is marked wall   thickening of the redundant distal sigmoid colon. Ischemia and/or   underlying neoplasm should be considered. Please note that active GI   bleed cannot be excluded on this noncontrast study.    Markedly distended gallbladder. No calcified gallstones. Abdominal   ultrasound suggested for further evaluation.      ASSESSMENT :     Gastrointestinal hemorrhage    No pertinent past medical history    Hypothyroidism    HTN (hypertension)    HLD (hyperlipidemia)    Glaucoma    GERD (gastroesophageal reflux disease)    BPH (benign prostatic hyperplasia)    Prostate cancer    No significant past surgical history        PLAN:  HPI:  Patient is 89 years old male from [facility] with past medical history of hypertension, hyperlipidemia. glaucoma, GERD, BPH, hypothyroidism, and prostate cancer, baseline A&Ox1 who was transferred due to rectal bleeding. His BP was around 85/50. Patient also has elevated WBCs and elevated lactate. Patient was admitted in April with suspected bacteremia and dysphagia for which he had esophagogram that showed Mildly dilated proximal and midesophagus with mild narrowing of the distal esophagus with mild irregularity of esophageal wall. In ED, Patient received 1L IV bolus of NS. DDAVPx1 and started on PPI drip. Patient doesn't seem to have chest pain, abdominal pain or vomiting or shortness of breath.    REVIEW OF SYSTEMS:  Limited due to mental status      (16 May 2023 01:40)    # PROGNOSIS IS POOR.  # [] - CASE DISCUSSED AT LENGTH WITH MANA BALL @ [phone] [ OF MARIA INES BALL, PATIENT'S  ?COUSIN] - ALL QUESTIONS ANSWERED. HE EXPRESSED THAT PATIENT DOES NOT HAVE ANY OTHER RELATIVES OR FRIENDS WHO ARE INVOLVED IN HIS CARE. HE IS AGREEABLE TO SERVE AS PATIENT'S SURROGATE DECISION MAKER GIVEN THAT PATIENT DOES NOT HAVE MEDICAL CAPACITY FOR DECISION MAKING [GIVEN DEMENTIA].     D/W MR. BALL THAT PATIENT'S PROGNOSIS IS POOR GIVEN CURRENT CLINICAL CONDITION - SEPSIS S/T UNDERLYING ACUTE DIVERTICULITIS, UTI - COMPLICATED BY COLOVESICULAR FISTULA. DISCUSSED THAT PATIENT IS HIGH SURGICAL RISK AND THAT HE HAS HAD [PER REVIEW] DECLINING FUNCTIONAL STATUS. MR. BALL EXPRESSED UNDERSTANDING AND THAT PATIENT WOULD LIKELY HAVE WISHED TO BE KEPT COMFORTABLE IN THIS CIRCUMSTANCE AND WOULD DEFER AGGRESSIVE MEDICAL INTERVENTIONS. HE ADDRESSED THAT HE WISHES FOR GOC OF DNR/DNI/COMFORT CARE [MOLST FILLED] AND IS AGREEABLE FOR RETURNING TO [facility] WITH HOSPICE.    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  - NOTED CT A/P  - ZOSYN, F/U BCX AND UCX  - PRN PAIN CONTROL  - ID CONSULT  - SURGERY CONSULT  - GI CONSULT    # R/O GI BLEED  - S/P DDAVP  - PPI DRIP  - NPO  - TYPE AND SCREEN  - HOLD A/C  - GI CONSULT    # ELEVATED LACTIC ACID  - TREND LACTIC ACID    # DYSPHAGIA  - RESUME DIET AS TOLERATED, GIVEN GOC PER SDM  - S/P RECENT GI AND ST EVALUATIONS  - GI CONSULT    # ABNORMAL GALLBLADDER   - F/U GB U/S    # HYPOKALEMIA  - REPLETING WITH SUPPLEMENT    # AMBULATORY DYSFUNCTION S/T OA, OP  - F/U PT EVAL    # LIKELY SEVERE PROTEIN CALORIE MALNUTRITION  - NUTRITIONAL SUPPLEMENT    # HTN  # HLD  # GERD  # BPH, HX OF PROSTATE CA  # HYPOTHYROIDISM  # GLAUCOMA  # VASCULAR DEMENTIA  # GI AND DVT PPX.

## 2023-05-18 NOTE — PROGRESS NOTE ADULT - ASSESSMENT
89M from nursing home admitted to medicine service for management of GI bleed found to have acute diverticulitis of the sigmoid colon.  CT showing air in the bladder and bilateral ureters concerning for possible colovesicular fistula.   5/17 GOC:  patients surrogate decision maker states that "patient would likely have wished to be kept comfortable in this circumstance and would defer aggressive medical interventions. He addressed that he wished for GOC of DNR/DNI/comfort care and is agreeable for returning to [facility] with hospice."    - Abx per ID   - Care per primary team    - reconsult surgery as needed 89M from nursing home admitted to medicine service for management of GI bleed found to have acute diverticulitis of the sigmoid colon.  CT showing air in the bladder and bilateral ureters concerning for possible colovesicular fistula.   5/17 GOC:  patients surrogate decision maker states that "patient would likely have wished to be kept comfortable in this circumstance and would defer aggressive medical interventions. He addressed that he wished for GOC of DNR/DNI/comfort care and is agreeable for returning to [facility] with hospice."  H/H stable    - Abx per ID  - f/u GI recs   - Care per primary team    - reconsult surgery as needed

## 2023-05-18 NOTE — PROGRESS NOTE ADULT - PROBLEM SELECTOR PLAN 3
- HR>95  - WBCs 13K   - BP 85/50 > IMPROVED WITH IVF   - Lactate ~ 3  - Empirically rocephin; could be due to hypovolemia due to bleeding and dehydration   - Will send Urine and blood culture   - Chest X ray   - f/u cultures - HR>95  - WBCs 13K   - BP 85/50 > IMPROVED WITH IVF   - Lactate ~ 3  - c/w zosyn (s/p rocephin 1 dose)  - BCx NGTD  - UCx- Ecoil >100k

## 2023-05-18 NOTE — PROGRESS NOTE ADULT - ASSESSMENT
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  prognosis is poor.

## 2023-05-18 NOTE — PROGRESS NOTE ADULT - SUBJECTIVE AND OBJECTIVE BOX
PGY-1 Progress Note discussed with attending    PAGER #: [[phone]] TILL 5:00 PM  PLEASE CONTACT ON CALL TEAM:  - On Call Team (Please refer to Clarissa) FROM 5:00 PM - 8:30PM  - Nightfloat Team FROM 8:30 -7:30 AM    CHIEF COMPLAINT & BRIEF HOSPITAL COURSE:    INTERVAL HPI/OVERNIGHT EVENTS:   MEDICATIONS  (STANDING):  ascorbic acid 500 milliGRAM(s) Oral daily  finasteride 5 milliGRAM(s) Oral daily  levothyroxine 25 MICROGram(s) Oral daily  pantoprazole  Injectable 40 milliGRAM(s) IV Push two times a day  piperacillin/tazobactam IVPB.. 3.375 Gram(s) IV Intermittent every 8 hours  simvastatin 20 milliGRAM(s) Oral at bedtime  sodium chloride 0.9%. 1000 milliLiter(s) (100 mL/Hr) IV Continuous <Continuous>  tamsulosin 0.4 milliGRAM(s) Oral at bedtime    MEDICATIONS  (PRN):      REVIEW OF SYSTEMS:  CONSTITUTIONAL: No fever, weight loss, or fatigue  RESPIRATORY: No cough, wheezing, chills or hemoptysis; No shortness of breath  CARDIOVASCULAR: No chest pain, palpitations, dizziness, or leg swelling  GASTROINTESTINAL: No abdominal pain. No nausea, vomiting, or hematemesis; No diarrhea or constipation. No melena or hematochezia.  GENITOURINARY: No dysuria or hematuria, urinary frequency  NEUROLOGICAL: No headaches, memory loss, loss of strength, numbness, or tremors  SKIN: No itching, burning, rashes, or lesions     Vital Signs Last 24 Hrs  T(C): 36.8 (18 May 2023 05:18), Max: 36.8 (18 May 2023 05:18)  T(F): 98.2 (18 May 2023 05:18), Max: 98.2 (18 May 2023 05:18)  HR: 101 (18 May 2023 05:18) (101 - 107)  BP: 132/52 (18 May 2023 05:18) (132/52 - 174/77)  BP(mean): --  RR: 20 (18 May 2023 05:18) (20 - 20)  SpO2: 97% (18 May 2023 05:18) (97% - 100%)    Parameters below as of 18 May 2023 05:18  Patient On (Oxygen Delivery Method): room air        PHYSICAL EXAMINATION:  GENERAL: NAD, well built  HEAD:  Atraumatic, Normocephalic  EYES:  conjunctiva and sclera clear  NECK: Supple, No JVD, Normal thyroid  CHEST/LUNG: Clear to auscultation. Clear to percussion bilaterally; No rales, rhonchi, wheezing, or rubs  HEART: Regular rate and rhythm; No murmurs, rubs, or gallops  ABDOMEN: Soft, Nontender, Nondistended; Bowel sounds present  NERVOUS SYSTEM:  Alert & Oriented X3,    EXTREMITIES:  2+ Peripheral Pulses, No clubbing, cyanosis, or edema  SKIN: warm dry                          9.5    11.14 )-----------( 373      ( 18 May 2023 08:40 )             31.7     05-18    139  |  113<H>  |  8   ----------------------------<  75  4.1   |  18<L>  |  0.59    Ca    8.2<L>      18 May 2023 08:40  Phos  2.6     05-18  Mg     2.2     05-18    TPro  5.2<L>  /  Alb  1.1<L>  /  TBili  0.5  /  DBili  x   /  AST  17  /  ALT  10  /  AlkPhos  94  05-18    LIVER FUNCTIONS - ( 18 May 2023 08:40 )  Alb: 1.1 g/dL / Pro: 5.2 g/dL / ALK PHOS: 94 U/L / ALT: 10 U/L DA / AST: 17 U/L / GGT: x                   CAPILLARY BLOOD GLUCOSE      RADIOLOGY & ADDITIONAL TESTS:                   PGY-1 Progress Note discussed with attending    PLEASE CONTACT ON CALL TEAM:  - On Call Team (Please refer to Clarissa) FROM 5:00 PM - 8:30PM  - Nightfloat Team FROM 8:30 -7:30 AM    CHIEF COMPLAINT & BRIEF HOSPITAL COURSE: No acute events overnight. Hendrickson placed by urology 5/18. pending bladder US to confirm position of hendrickson. GOC discussed yesterday, for hospice in [facility].     INTERVAL HPI/OVERNIGHT EVENTS:   MEDICATIONS  (STANDING):  ascorbic acid 500 milliGRAM(s) Oral daily  finasteride 5 milliGRAM(s) Oral daily  levothyroxine 25 MICROGram(s) Oral daily  pantoprazole  Injectable 40 milliGRAM(s) IV Push two times a day  piperacillin/tazobactam IVPB.. 3.375 Gram(s) IV Intermittent every 8 hours  simvastatin 20 milliGRAM(s) Oral at bedtime  sodium chloride 0.9%. 1000 milliLiter(s) (100 mL/Hr) IV Continuous <Continuous>  tamsulosin 0.4 milliGRAM(s) Oral at bedtime    MEDICATIONS  (PRN):      REVIEW OF SYSTEMS: unable to obtain due to mental status     Vital Signs Last 24 Hrs  T(C): 36.8 (18 May 2023 05:18), Max: 36.8 (18 May 2023 05:18)  T(F): 98.2 (18 May 2023 05:18), Max: 98.2 (18 May 2023 05:18)  HR: 101 (18 May 2023 05:18) (101 - 107)  BP: 132/52 (18 May 2023 05:18) (132/52 - 174/77)  BP(mean): --  RR: 20 (18 May 2023 05:18) (20 - 20)  SpO2: 97% (18 May 2023 05:18) (97% - 100%)    Parameters below as of 18 May 2023 05:18  Patient On (Oxygen Delivery Method): room air        PHYSICAL EXAMINATION:  GENERAL: NAD, elderly male  HEAD:  Atraumatic, Normocephalic  EYES:  conjunctiva and sclera clear  NECK: Supple, No JVD, Normal thyroid  CHEST/LUNG: Clear to auscultation. Clear to percussion bilaterally; No rales, rhonchi, wheezing, or rubs  HEART: Regular rate and rhythm; No murmurs, rubs, or gallops  ABDOMEN: Soft, (+) TTP in b/l LQ, Nondistended; Bowel sounds present  NERVOUS SYSTEM:  Alert & Oriented X0    EXTREMITIES:  2+ Peripheral Pulses, No clubbing, cyanosis, or edema  SKIN: warm dry                          9.5    11.14 )-----------( 373      ( 18 May 2023 08:40 )             31.7     05-18    139  |  113<H>  |  8   ----------------------------<  75  4.1   |  18<L>  |  0.59    Ca    8.2<L>      18 May 2023 08:40  Phos  2.6     05-18  Mg     2.2     05-18    TPro  5.2<L>  /  Alb  1.1<L>  /  TBili  0.5  /  DBili  x   /  AST  17  /  ALT  10  /  AlkPhos  94  05-18    LIVER FUNCTIONS - ( 18 May 2023 08:40 )  Alb: 1.1 g/dL / Pro: 5.2 g/dL / ALK PHOS: 94 U/L / ALT: 10 U/L DA / AST: 17 U/L / GGT: x                   CAPILLARY BLOOD GLUCOSE      RADIOLOGY & ADDITIONAL TESTS:

## 2023-05-18 NOTE — PROGRESS NOTE ADULT - PROBLEM SELECTOR PLAN 1
CT abdomen/pelvis w/ iv contrast 5/16- Acute sigmoid diverticulitis with suggestion of a fistulous connection   with the urinary bladder as described above. There is marked wall thickening of the redundant distal sigmoid colon. Ischemia and/or underlying neoplasm should be considered.  - surgery consulted- do not recommend surgical intervention  - GI consulted  - c/w  IV Protonix 40mg daily  - Continue IV abx rocephin   - Maintain active T&S, 2 large bore peripheral IVs, transfuse for goal Hgb >7 or if symptomatic  - F/U GOC CONVERSATION ***, CONSULT PALLIATIVE CT abdomen/pelvis w/ iv contrast 5/16- Acute sigmoid diverticulitis with suggestion of a fistulous connection   with the urinary bladder as described above. There is marked wall thickening of the redundant distal sigmoid colon. Ischemia and/or underlying neoplasm should be considered.  - surgery consulted- do not recommend surgical intervention  - GI consulted  - c/w  IV Protonix 40mg daily  - Continue IV abx zosyn   - GOC discussed, pt DNR/DNI, dispo to [facility] w/ hospice.  - Hendrickson placed by urology 5/18. pending bladder US to confirm position of hendrickson

## 2023-05-18 NOTE — PROGRESS NOTE ADULT - ASSESSMENT
Patient is 89 years old male from [facility] with past medical history of hypertension, hyperlipidemia. glaucoma, GERD, BPH, hypothyroidism, and prostate cancer, baseline A&Ox1 who was transferred due to rectal bleeding. His BP was around 85/50.  Patient also has elevated WBCs and elevated lactate. In ED, Patient received 1L IV bolus of NS, BP improved appropriately. DDAVPx1 and started on PPI drip. 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 Admitted for further management. Surgery evaluated the pt and recommended conservative management. Pt started on IV PPI 40mg BID, NPO, IVF, rocephin. BCx and UCx sent. Pending GOC and palliative discussion with HCP.     Patient is 89 years old male from [facility] with past medical history of hypertension, hyperlipidemia. glaucoma, GERD, BPH, hypothyroidism, and prostate cancer, baseline A&Ox1 who was transferred due to rectal bleeding. His BP was around 85/50.  Patient also has elevated WBCs and elevated lactate. In ED, Patient received 1L IV bolus of NS, BP improved appropriately. DDAVPx1 and started on PPI drip. 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 Admitted for further management. Surgery evaluated the pt and recommended conservative management. Pt started on IV PPI 40mg BID, NPO, IVF, zosyn. BCx and UCx sent. GOC discussed, pt DNR/DNI, for LTC w/ hospice. Hendrickson placed by urology 5/18. pending bladder US to confirm position of hendrickson.

## 2023-05-18 NOTE — PROGRESS NOTE ADULT - PROBLEM SELECTOR PLAN 4
- Esophagogram was done on last admission; Noted   - Start on IV fluids   - speech and swallow eval   - GI consult - Esophagogram was done on last admission; Noted   - Start on IV fluids   - NPO

## 2023-05-18 NOTE — PROGRESS NOTE ADULT - SUBJECTIVE AND OBJECTIVE BOX
INTERVAL HPI/OVERNIGHT EVENTS:  Patient seen and examined at bedside   Pt resting comfortably. No acute complaints.     Vital Signs Last 24 Hrs  T(C): 36.8 (18 May 2023 05:18), Max: 36.8 (18 May 2023 05:18)  T(F): 98.2 (18 May 2023 05:18), Max: 98.2 (18 May 2023 05:18)  HR: 101 (18 May 2023 05:18) (101 - 107)  BP: 132/52 (18 May 2023 05:18) (132/52 - 174/77)  BP(mean): --  RR: 20 (18 May 2023 05:18) (20 - 20)  SpO2: 97% (18 May 2023 05:18) (97% - 100%)    Parameters below as of 18 May 2023 05:18  Patient On (Oxygen Delivery Method): room air        Physical:  General:  NAD.  Respiratory: non labored  Abdomen: Soft, nondistended, winces to palpation in the LLQ      I&O's Detail      LABS:                        8.7    13.08 )-----------( 369      ( 17 May 2023 06:46 )             30.3             05-17    139  |  114<H>  |  7   ----------------------------<  91  3.6   |  19<L>  |  0.64    Ca    8.2<L>      17 May 2023 06:46  Phos  2.9     05-17  Mg     2.0     05-17    TPro  5.2<L>  /  Alb  1.2<L>  /  TBili  0.5  /  DBili  x   /  AST  10  /  ALT  9<L>  /  AlkPhos  84  05-17

## 2023-05-19 ENCOUNTER — TRANSCRIPTION ENCOUNTER (OUTPATIENT)
Age: 88
End: 2023-05-19

## 2023-05-19 LAB — SARS-COV-2 RNA SPEC QL NAA+PROBE: SIGNIFICANT CHANGE UP

## 2023-05-19 PROCEDURE — 76857 US EXAM PELVIC LIMITED: CPT | Mod: 26

## 2023-05-19 RX ORDER — METRONIDAZOLE 500 MG
500 TABLET ORAL EVERY 8 HOURS
Refills: 0 | Status: DISCONTINUED | OUTPATIENT
Start: 2023-05-19 | End: 2023-05-20

## 2023-05-19 RX ORDER — CEFEPIME 1 G/1
1000 INJECTION, POWDER, FOR SOLUTION INTRAMUSCULAR; INTRAVENOUS EVERY 8 HOURS
Refills: 0 | Status: DISCONTINUED | OUTPATIENT
Start: 2023-05-19 | End: 2023-05-20

## 2023-05-19 RX ADMIN — Medication 100 MILLIGRAM(S): at 21:30

## 2023-05-19 RX ADMIN — PIPERACILLIN AND TAZOBACTAM 25 GRAM(S): 4; .5 INJECTION, POWDER, LYOPHILIZED, FOR SOLUTION INTRAVENOUS at 05:37

## 2023-05-19 RX ADMIN — FINASTERIDE 5 MILLIGRAM(S): 5 TABLET, FILM COATED ORAL at 12:30

## 2023-05-19 RX ADMIN — MORPHINE SULFATE 1 MILLIGRAM(S): 50 CAPSULE, EXTENDED RELEASE ORAL at 13:06

## 2023-05-19 RX ADMIN — Medication 500 MILLIGRAM(S): at 12:31

## 2023-05-19 RX ADMIN — MORPHINE SULFATE 1 MILLIGRAM(S): 50 CAPSULE, EXTENDED RELEASE ORAL at 05:37

## 2023-05-19 RX ADMIN — PANTOPRAZOLE SODIUM 40 MILLIGRAM(S): 20 TABLET, DELAYED RELEASE ORAL at 05:38

## 2023-05-19 RX ADMIN — CEFEPIME 100 MILLIGRAM(S): 1 INJECTION, POWDER, FOR SOLUTION INTRAMUSCULAR; INTRAVENOUS at 21:30

## 2023-05-19 RX ADMIN — Medication 25 MICROGRAM(S): at 05:38

## 2023-05-19 RX ADMIN — MORPHINE SULFATE 1 MILLIGRAM(S): 50 CAPSULE, EXTENDED RELEASE ORAL at 06:05

## 2023-05-19 RX ADMIN — PIPERACILLIN AND TAZOBACTAM 25 GRAM(S): 4; .5 INJECTION, POWDER, LYOPHILIZED, FOR SOLUTION INTRAVENOUS at 14:51

## 2023-05-19 RX ADMIN — MORPHINE SULFATE 1 MILLIGRAM(S): 50 CAPSULE, EXTENDED RELEASE ORAL at 17:55

## 2023-05-19 RX ADMIN — PANTOPRAZOLE SODIUM 40 MILLIGRAM(S): 20 TABLET, DELAYED RELEASE ORAL at 17:54

## 2023-05-19 RX ADMIN — MORPHINE SULFATE 1 MILLIGRAM(S): 50 CAPSULE, EXTENDED RELEASE ORAL at 12:31

## 2023-05-19 RX ADMIN — SODIUM CHLORIDE 100 MILLILITER(S): 9 INJECTION INTRAMUSCULAR; INTRAVENOUS; SUBCUTANEOUS at 21:31

## 2023-05-19 RX ADMIN — MORPHINE SULFATE 1 MILLIGRAM(S): 50 CAPSULE, EXTENDED RELEASE ORAL at 00:34

## 2023-05-19 RX ADMIN — SIMVASTATIN 20 MILLIGRAM(S): 20 TABLET, FILM COATED ORAL at 21:31

## 2023-05-19 RX ADMIN — TAMSULOSIN HYDROCHLORIDE 0.4 MILLIGRAM(S): 0.4 CAPSULE ORAL at 21:31

## 2023-05-19 RX ADMIN — MORPHINE SULFATE 1 MILLIGRAM(S): 50 CAPSULE, EXTENDED RELEASE ORAL at 01:03

## 2023-05-19 RX ADMIN — SODIUM CHLORIDE 100 MILLILITER(S): 9 INJECTION INTRAMUSCULAR; INTRAVENOUS; SUBCUTANEOUS at 00:37

## 2023-05-19 NOTE — PROGRESS NOTE ADULT - SUBJECTIVE AND OBJECTIVE BOX
89y Male    Meds:  cefepime   IVPB 1000 milliGRAM(s) IV Intermittent every 8 hours  metroNIDAZOLE  IVPB 500 milliGRAM(s) IV Intermittent every 8 hours    Allergies    No Known Allergies    Intolerances        VITALS:  Vital Signs Last 24 Hrs  T(C): 36.8 (19 May 2023 13:09), Max: 36.8 (19 May 2023 13:09)  T(F): 98.3 (19 May 2023 13:09), Max: 98.3 (19 May 2023 13:09)  HR: 102 (19 May 2023 13:09) (92 - 102)  BP: 120/58 (19 May 2023 13:09) (105/67 - 120/58)  BP(mean): --  RR: 20 (19 May 2023 13:09) (16 - 20)  SpO2: 96% (19 May 2023 13:09) (95% - 98%)    Parameters below as of 19 May 2023 13:09  Patient On (Oxygen Delivery Method): room air        LABS/DIAGNOSTIC TESTS:                          9.5    11.14 )-----------( 373      ( 18 May 2023 08:40 )             31.7         05-18    139  |  113<H>  |  8   ----------------------------<  75  4.1   |  18<L>  |  0.59    Ca    8.2<L>      18 May 2023 08:40  Phos  2.6     05-18  Mg     2.2     05-18    TPro  5.2<L>  /  Alb  1.1<L>  /  TBili  0.5  /  DBili  x   /  AST  17  /  ALT  10  /  AlkPhos  94  05-18      LIVER FUNCTIONS - ( 18 May 2023 08:40 )  Alb: 1.1 g/dL / Pro: 5.2 g/dL / ALK PHOS: 94 U/L / ALT: 10 U/L DA / AST: 17 U/L / GGT: x             CULTURES: Clean Catch Clean Catch (Midstream)  05-16 @ 05:27   >100,000 CFU/ml Escherichia coli  Multiple Morphological Strains  --  Escherichia coli  Escherichia coli      .Blood Blood-Peripheral  05-15 @ 22:25   No growth to date.  --  --      .Blood Blood-Peripheral  05-15 @ 22:10   No growth to date.  --  --      .Urine  03-28 @ 14:55   No growth  --  --      .Blood  03-28 @ 13:45   No Growth Final  --  --            RADIOLOGY:      ROS:  [  ] UNABLE TO ELICIT 89y Male who is not doing well overall, he is awake but non verbal and only grunts when asked questions. He has no fevers and his WBC count is only marginally elevated. His Zosyn was changed to Maxipime / Flagyl as there was a recall on Zosyn.     Meds:  cefepime   IVPB 1000 milliGRAM(s) IV Intermittent every 8 hours  metroNIDAZOLE  IVPB 500 milliGRAM(s) IV Intermittent every 8 hours    Allergies    No Known Allergies    Intolerances        VITALS:  Vital Signs Last 24 Hrs  T(C): 36.8 (19 May 2023 13:09), Max: 36.8 (19 May 2023 13:09)  T(F): 98.3 (19 May 2023 13:09), Max: 98.3 (19 May 2023 13:09)  HR: 102 (19 May 2023 13:09) (92 - 102)  BP: 120/58 (19 May 2023 13:09) (105/67 - 120/58)  BP(mean): --  RR: 20 (19 May 2023 13:09) (16 - 20)  SpO2: 96% (19 May 2023 13:09) (95% - 98%)    Parameters below as of 19 May 2023 13:09  Patient On (Oxygen Delivery Method): room air        LABS/DIAGNOSTIC TESTS:                          9.5    11.14 )-----------( 373      ( 18 May 2023 08:40 )             31.7         05-18    139  |  113<H>  |  8   ----------------------------<  75  4.1   |  18<L>  |  0.59    Ca    8.2<L>      18 May 2023 08:40  Phos  2.6     05-18  Mg     2.2     05-18    TPro  5.2<L>  /  Alb  1.1<L>  /  TBili  0.5  /  DBili  x   /  AST  17  /  ALT  10  /  AlkPhos  94  05-18      LIVER FUNCTIONS - ( 18 May 2023 08:40 )  Alb: 1.1 g/dL / Pro: 5.2 g/dL / ALK PHOS: 94 U/L / ALT: 10 U/L DA / AST: 17 U/L / GGT: x             CULTURES: Clean Catch Clean Catch (Midstream)  05-16 @ 05:27   >100,000 CFU/ml Escherichia coli  Multiple Morphological Strains  --  Escherichia coli  Escherichia coli      .Blood Blood-Peripheral  05-15 @ 22:25   No growth to date.  --  --      .Blood Blood-Peripheral  05-15 @ 22:10   No growth to date.  --  --              RADIOLOGY:      ROS:  [ x ] UNABLE TO ELICIT

## 2023-05-19 NOTE — DISCHARGE NOTE PROVIDER - CARE PROVIDER_API CALL
Gabe Tomas)  Medicine  [address], [address] [place] [address]  Phone: [phone]  Fax: [phone]  Follow Up Time:

## 2023-05-19 NOTE — PROGRESS NOTE ADULT - REASON FOR ADMISSION
Lower GI bleed

## 2023-05-19 NOTE — PROGRESS NOTE ADULT - TENDERNESS
and suprapubic region/RLQ
and suprapubic region , appears less tender overall/LLQ/RLQ
in suprapubic region

## 2023-05-19 NOTE — PROGRESS NOTE ADULT - PROBLEM SELECTOR PLAN 2
- Patient brought to ED due to rectal bleeding in NH  - No more episodes since admission  - Hb 9.5; Baseline 13 >> 8.7 > 9.5   - C/W PPI  - Hold aspirin  - IV fluids 0.9% NS maintenance fluids  - CT abdomen/pelvis w/ iv contrast 5/16- Acute sigmoid diverticulitis with suggestion of a fistulous connection   with the urinary bladder as described above. There is marked wall thickening of the redundant distal sigmoid colon. Ischemia and/or underlying neoplasm should be considered.  - surgery consulted- do not recommend surgical intervention

## 2023-05-19 NOTE — PROGRESS NOTE ADULT - CARDIOVASCULAR
regular rate and rhythm/no pedal edema
regular rate and rhythm/pedal edema
regular rate and rhythm/no pedal edema

## 2023-05-19 NOTE — DISCHARGE NOTE PROVIDER - PROVIDER TOKENS
Please contact patient to schedule appointment. Dr wyatt is recommending facet injections. Please advise. Referral placed.   PROVIDER:[TOKEN:[[id number]]]

## 2023-05-19 NOTE — PROGRESS NOTE ADULT - PROBLEM SELECTOR PLAN 1
CT abdomen/pelvis w/ iv contrast 5/16- Acute sigmoid diverticulitis with suggestion of a fistulous connection   with the urinary bladder as described above. There is marked wall thickening of the redundant distal sigmoid colon. Ischemia and/or underlying neoplasm should be considered.  - surgery consulted- do not recommend surgical intervention  - GI consulted  - c/w  IV Protonix 40mg daily  - GOC discussed, pt DNR/DNI, dispo to [facility] w/ hospice.  - Hendrickson placed by urology 5/18  - pending bladder US to confirm position of hendrickson  - BCx NGTD and UCx >100K E.Coli.  - Zosyn switched to cefepime/flagyl (due to urgent recall of zosyn in the hospital).   - Started on clear liquid diet 5/19.   - For d/c tomorrow to [facility] for LTC w/ hospice.

## 2023-05-19 NOTE — PROGRESS NOTE ADULT - PROBLEM SELECTOR PLAN 3
- HR>95  - WBCs 13K   - BP 85/50 > IMPROVED WITH IVF   - Lactate ~ 3  - c/w zosyn (s/p rocephin 1 dose)  - BCx NGTD  - UCx- Ecoil >100k Low Risk (score 7-11)

## 2023-05-19 NOTE — DISCHARGE NOTE PROVIDER - ATTENDING ATTESTATION STATEMENT
Patient:   JERAMIE LION            MRN: [id number]            FIN: [id number]              Age:   83 years     Sex:  FEMALE     :  37   Associated Diagnoses:   None   Author:   EDUARD CURRY      09/10/20 00:24 Pt is COVID NEGATIVE. Dr Lutz notified   I have personally seen and examined the patient. I have collaborated with and supervised the

## 2023-05-19 NOTE — PROGRESS NOTE ADULT - RESPIRATORY
clear to auscultation bilaterally/no wheezes/no rales/no rhonchi
no wheezes/rales/rhonchi
clear to auscultation bilaterally/no wheezes/no rales/no rhonchi

## 2023-05-19 NOTE — PROGRESS NOTE ADULT - PROBLEM SELECTOR PROBLEM 1
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

## 2023-05-19 NOTE — PROGRESS NOTE ADULT - MUSCULOSKELETAL
no joint swelling/no joint erythema/no joint warmth

## 2023-05-19 NOTE — PROGRESS NOTE ADULT - GASTROINTESTINAL
soft/nondistended/normal active bowel sounds/no guarding/no rigidity/tender

## 2023-05-19 NOTE — DISCHARGE NOTE PROVIDER - HOSPITAL COURSE
Patient is 89 years old male from [facility] with past medical history of hypertension, hyperlipidemia. glaucoma, GERD, BPH, hypothyroidism, and prostate cancer, baseline A&Ox1 who was transferred due to rectal bleeding. His BP was around 85/50.  Patient also has elevated WBCs and elevated lactate. In ED, Patient received 1L IV bolus of NS, BP improved appropriately. DDAVPx1 and started on PPI drip. 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 Admitted for further management. Surgery evaluated the pt and recommended conservative management. Pt started on IV PPI 40mg BID, NPO, IVF, zosyn. BCx NGTD and UCx >100K E.Coli. GOC discussed, pt DNR/DNI, for LTC w/ hospice. Hendrickson placed by urology 5/18. Pending bladder US to confirm position of hendrickson. Zosyn switched to cefepime/flagyl (due to urgent recall of zosyn in the hospital). Started on clear liquid diet 5/19. For d/c 5/19 to [facility] for LTC w/ hospice.    Patient is 89 years old male from [facility] with past medical history of hypertension, hyperlipidemia. glaucoma, GERD, BPH, hypothyroidism, and prostate cancer, baseline A&Ox1 who was transferred due to rectal bleeding. His BP was around 85/50.  Patient also has elevated WBCs and elevated lactate. In ED, Patient received 1L IV bolus of NS, BP improved appropriately. DDAVPx1 and started on PPI drip. 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 Admitted for further management. Surgery evaluated the pt and recommended conservative management. Pt started on IV PPI 40mg BID, NPO, IVF, zosyn. BCx NGTD and UCx >100K E.Coli. GOC discussed, pt DNR/DNI, for LTC w/ hospice. Hendrickson placed by urology 5/18. Bladder US 5/19 was a limited study due to bowel gas, but hendrickson catheter likely in place. There is low urine out put from the hendrickson since placement 5/18, likely due to renal failure, decreased urine output despite fluid resuscitation. Zosyn switched to cefepime/flagyl (due to urgent recall of zosyn in the hospital). Started on clear liquid diet 5/19. For d/c 5/19 to [facility] for LTC w/ hospice.

## 2023-05-19 NOTE — PROGRESS NOTE ADULT - GENITOURINARY COMMENTS
hendrickson in place with stool in bag
Khanna in place with fecal material coming out along with urine

## 2023-05-19 NOTE — PROGRESS NOTE ADULT - ASSESSMENT
Acute Sigmoid Diverticulitis with  fistula to bladder  Leukocytosis - mild      Plan - Cont Maxipime 1gm iv q8hrs  Cont Flagyl 500mgs iv q8hrs give both for 3-4 days more.  prognosis is poor.  Going to hospice in a NH.

## 2023-05-19 NOTE — PHARMACOTHERAPY INTERVENTION NOTE - COMMENTS
Height updated from [facility] chart
Zosyn for Intra abd infx, day #4, due to Zosyn recall, the recommendation was to reevaluate the need or switch to alternative (Cefepime + Flagyl)

## 2023-05-19 NOTE — DISCHARGE NOTE PROVIDER - NSDCCPCAREPLAN_GEN_ALL_CORE_FT
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 This is a very serious condition that can quickly worsen infection. A hendrickson catheter was placed 5/18. The complications of operating to correct this fisutla on someone of your age and the severity of the infection/fistula, outweigh the benefits of surgery. The surgical team recommended conservative management. You were also seen by a GI team that recommended conservative mangement. A goals of care discussion with a family member was done, and they decided to take the comfort care route, and make you comfortable, with discharge back to your nursing home for long term care with hospice. You were treated with zosyn, then cefepime and flagyl, IV antibiotics for intra-abdominal infection.      SECONDARY DISCHARGE DIAGNOSES  Diagnosis: Hypokalemia  Assessment and Plan of Treatment: You were initially found to have a low potassium, which was then replaced, and corrected.    Diagnosis: Hypertension  Assessment and Plan of Treatment: You have a history of Hypertension.   On this admission, your Blood Pressure was adequately controlled with XXXXX  Your blood pressure target is 120-140/80-90, maintain healthy lifestyle, low salt diet, avoid fatty food, weight loss, exercise regularly or stay active as tolerated 30 mins X 3 times per week.  Notify your doctor if you have any of the following symptoms:   (Dizziness, Lightheadedness, Blurry vision, Headache, Chest pain, Shortness of breath.)  Please continue taking your home medications and follow-up with your PCP in 1 week from discharge to adjust medications as needed.    Diagnosis: Hyperlipidemia  Assessment and Plan of Treatment: You have history of Hyperlipidemia.   Please take your medication as prescribed. Maintain healthy lifestyle, low fat diet, exercise regularly and check your lipid levels routinely.     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 Your CT scan showed diverticulitis complicated by fistula (abnormal connection) between your bladder and colon. This is a very serious condition that can quickly lead to infection. A hendrickson catheter was placed 5/18. The complications of operating to correct this fisutla on someone of your age and the severity of the infection/fistula, outweigh the benefits of surgery. The surgical team recommended conservative management. You were also seen by a GI team that recommended conservative mangement. A goals of care discussion with a family member was done, and they decided to take the comfort care route, and make you comfortable, with discharge back to your nursing home for long term care with hospice. You were treated with zosyn, then cefepime and flagyl, IV antibiotics for intra-abdominal infection. You were also treated with IV fluids, and pain medication to control any pain you were experiencing. Hendrickson catheter was placed 5/18, and you were producing minimal urine since placement. Bladder ultrasound was done 5/19, which was a limited study that showed that the hendrickson catheter was likely placed in the bladder.      SECONDARY DISCHARGE DIAGNOSES  Diagnosis: Hypokalemia  Assessment and Plan of Treatment: You were initially found to have a low potassium, which was then replaced, and corrected.    Diagnosis: Hypertension  Assessment and Plan of Treatment: You have a history of Hypertension, on Metoprolol at [facility]. You were not restarted on your blood pressure medications, due to your rectal bleeding. Your blood pressues were within normal ranges during your hospital stay.   Your blood pressure target is 120-140/80-90, maintain healthy lifestyle, low salt diet, avoid fatty food, weight loss, exercise regularly or stay active as tolerated 30 mins X 3 times per week.  Notify your doctor if you have any of the following symptoms:   (Dizziness, Lightheadedness, Blurry vision, Headache, Chest pain, Shortness of breath.)  Please  follow-up with your PCP in 1 week from discharge to adjust medications as needed.    Diagnosis: Hyperlipidemia  Assessment and Plan of Treatment: You have history of Hyperlipidemia.   Please take your medication as prescribed. Maintain healthy lifestyle, low fat diet, exercise regularly and check your lipid levels routinely.     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 Your CT scan showed diverticulitis complicated by fistula (abnormal connection) between your bladder and colon. This is a very serious condition that can quickly lead to infection. A hendrickson catheter was placed 5/18. The complications of operating to correct this fisutla on someone of your age and the severity of the infection/fistula, outweigh the benefits of surgery. The surgical team recommended conservative management. You were also seen by a GI team that recommended conservative mangement. A goals of care discussion with a family member was done, and they decided to take the comfort care route, and make you comfortable, with discharge back to your nursing home for long term care with hospice. You were treated with zosyn, then cefepime and flagyl, IV antibiotics for intra-abdominal infection. You were also treated with IV fluids, and pain medication to control any pain you were experiencing. Hendrickson catheter was placed 5/18, and you were producing minimal urine since placement. Bladder ultrasound was done 5/19, which was a limited study that showed that the hendrickson catheter was likely placed in the bladder.      SECONDARY DISCHARGE DIAGNOSES  Diagnosis: Palliative care encounter  Assessment and Plan of Treatment: D/W MR. BALL THAT PATIENT'S PROGNOSIS IS POOR GIVEN CURRENT CLINICAL CONDITION - SEPSIS S/T UNDERLYING ACUTE DIVERTICULITIS, UTI - COMPLICATED BY COLOVESICULAR FISTULA. DISCUSSED THAT PATIENT IS HIGH SURGICAL RISK AND THAT HE HAS HAD [PER REVIEW] DECLINING FUNCTIONAL STATUS. MR. BALL EXPRESSED UNDERSTANDING AND THAT PATIENT WOULD LIKELY HAVE WISHED TO BE KEPT COMFORTABLE IN THIS CIRCUMSTANCE AND WOULD DEFER AGGRESSIVE MEDICAL INTERVENTIONS AND EVALUATIONS. HE ADDRESSED THAT HE WISHES FOR GOC OF DNR/DNI/COMFORT CARE [MOLST FILLED] AND IS AGREEABLE FOR RETURNING TO [facility] WITH HOSPICE.    Diagnosis: Hypokalemia  Assessment and Plan of Treatment: You were initially found to have a low potassium, which was then replaced, and corrected.    Diagnosis: Hypertension  Assessment and Plan of Treatment: You have a history of Hypertension, on Metoprolol at [facility]. You were not restarted on your blood pressure medications, due to your rectal bleeding. Your blood pressues were within normal ranges during your hospital stay.   Your blood pressure target is 120-140/80-90, maintain healthy lifestyle, low salt diet, avoid fatty food, weight loss, exercise regularly or stay active as tolerated 30 mins X 3 times per week.  Notify your doctor if you have any of the following symptoms:   (Dizziness, Lightheadedness, Blurry vision, Headache, Chest pain, Shortness of breath.)  Please  follow-up with your PCP in 1 week from discharge to adjust medications as needed.    Diagnosis: Hyperlipidemia  Assessment and Plan of Treatment: You have history of Hyperlipidemia.   Please take your medication as prescribed. Maintain healthy lifestyle, low fat diet, exercise regularly and check your lipid levels routinely.

## 2023-05-19 NOTE — PROGRESS NOTE ADULT - PROVIDER SPECIALTY LIST ADULT
Infectious Disease
Internal Medicine
Internal Medicine
Surgery
Infectious Disease
Internal Medicine
Surgery
Gastroenterology
Infectious Disease
Internal Medicine

## 2023-05-19 NOTE — PROGRESS NOTE ADULT - ASSESSMENT
Patient is 89 years old male from [facility] with past medical history of hypertension, hyperlipidemia. glaucoma, GERD, BPH, hypothyroidism, and prostate cancer, baseline A&Ox1 who was transferred due to rectal bleeding. His BP was around 85/50.  Patient also has elevated WBCs and elevated lactate. In ED, Patient received 1L IV bolus of NS, BP improved appropriately. DDAVPx1 and started on PPI drip. 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 Admitted for further management. Surgery evaluated the pt and recommended conservative management. Pt started on IV PPI 40mg BID, NPO, IVF, zosyn. BCx NGTD and UCx >100K E.Coli. GOC discussed, pt DNR/DNI, for LTC w/ hospice. Hendrickson placed by urology 5/18. Pending bladder US to confirm position of hendrickson. Zosyn switched to cefepime/flagyl (due to urgent recall of zosyn in the hospital). Started on clear liquid diet 5/19. For d/c tomorrow to [facility] for LTC w/ hospice.

## 2023-05-19 NOTE — PROGRESS NOTE ADULT - SUBJECTIVE AND OBJECTIVE BOX
PGY-1 Progress Note discussed with attending    PLEASE CONTACT ON CALL TEAM:  - On Call Team (Please refer to Clarissa) FROM 5:00 PM - 8:30PM  - Nightfloat Team FROM 8:30 -7:30 AM    CHIEF COMPLAINT & BRIEF HOSPITAL COURSE: No acute events overnight. Hendrickson placed by urology 5/18. pending bladder US to confirm position of hendrickson. bladder scan today showed no residual urine, low urine output from hendrickson, and leaking present around catheter.  D/C planned to hospice in [facility].     INTERVAL HPI/OVERNIGHT EVENTS:   MEDICATIONS  (STANDING):  ascorbic acid 500 milliGRAM(s) Oral daily  finasteride 5 milliGRAM(s) Oral daily  levothyroxine 25 MICROGram(s) Oral daily  pantoprazole  Injectable 40 milliGRAM(s) IV Push two times a day  piperacillin/tazobactam IVPB.. 3.375 Gram(s) IV Intermittent every 8 hours  simvastatin 20 milliGRAM(s) Oral at bedtime  sodium chloride 0.9%. 1000 milliLiter(s) (100 mL/Hr) IV Continuous <Continuous>  tamsulosin 0.4 milliGRAM(s) Oral at bedtime    MEDICATIONS  (PRN):      REVIEW OF SYSTEMS: unable to obtain due to mental status     Vital Signs Last 24 Hrs  T(C): 36.8 (18 May 2023 05:18), Max: 36.8 (18 May 2023 05:18)  T(F): 98.2 (18 May 2023 05:18), Max: 98.2 (18 May 2023 05:18)  HR: 101 (18 May 2023 05:18) (101 - 107)  BP: 132/52 (18 May 2023 05:18) (132/52 - 174/77)  BP(mean): --  RR: 20 (18 May 2023 05:18) (20 - 20)  SpO2: 97% (18 May 2023 05:18) (97% - 100%)    Parameters below as of 18 May 2023 05:18  Patient On (Oxygen Delivery Method): room air        PHYSICAL EXAMINATION:  GENERAL: NAD, elderly male  HEAD:  Atraumatic, Normocephalic  EYES:  conjunctiva and sclera clear  NECK: Supple, No JVD, Normal thyroid  CHEST/LUNG: Clear to auscultation. Clear to percussion bilaterally; No rales, rhonchi, wheezing, or rubs  HEART: Regular rate and rhythm; No murmurs, rubs, or gallops  ABDOMEN: Soft, (+) TTP in b/l LQ, Nondistended; Bowel sounds present  NERVOUS SYSTEM:  Alert & Oriented X0    EXTREMITIES:  2+ Peripheral Pulses, No clubbing, cyanosis, or edema  SKIN: warm dry                          9.5    11.14 )-----------( 373      ( 18 May 2023 08:40 )             31.7     05-18    139  |  113<H>  |  8   ----------------------------<  75  4.1   |  18<L>  |  0.59    Ca    8.2<L>      18 May 2023 08:40  Phos  2.6     05-18  Mg     2.2     05-18    TPro  5.2<L>  /  Alb  1.1<L>  /  TBili  0.5  /  DBili  x   /  AST  17  /  ALT  10  /  AlkPhos  94  05-18    LIVER FUNCTIONS - ( 18 May 2023 08:40 )  Alb: 1.1 g/dL / Pro: 5.2 g/dL / ALK PHOS: 94 U/L / ALT: 10 U/L DA / AST: 17 U/L / GGT: x                   CAPILLARY BLOOD GLUCOSE      RADIOLOGY & ADDITIONAL TESTS:

## 2023-05-19 NOTE — DISCHARGE NOTE PROVIDER - NSDCMRMEDTOKEN_GEN_ALL_CORE_FT
aspirin 81 mg oral tablet: 1 orally once a day  finasteride 5 mg oral tablet: 1 tab(s) orally once a day  guaiFENesin 100 mg/5 mL oral liquid: 10 milliliter(s) orally every 6 hours as needed for  cough  latanoprost 0.005% ophthalmic solution: 1 in each eye once a day  levothyroxine 25 mcg (0.025 mg) oral tablet: 1 tab(s) orally once a day  metoprolol succinate 25 mg oral tablet, extended release: 1 tab(s) orally once a day  pantoprazole 40 mg oral delayed release tablet: 1 orally once a day  simvastatin 20 mg oral tablet: 1 tab(s) orally once a day (at bedtime)  tamsulosin 0.4 mg oral capsule: 1 cap(s) orally once a day  triamcinolone 0.1% topical cream: Apply topically to affected area 2 times a day  Vitamin C 500 mg oral capsule: 1 orally once a day   aspirin 81 mg oral tablet: 1 orally once a day  finasteride 5 mg oral tablet: 1 tab(s) orally once a day  guaiFENesin 100 mg/5 mL oral liquid: 10 milliliter(s) orally every 6 hours as needed for  cough  latanoprost 0.005% ophthalmic solution: 1 in each eye once a day  levothyroxine 25 mcg (0.025 mg) oral tablet: 1 tab(s) orally once a day  morphine: 1 milligram(s) intravenous every 6 hours  pantoprazole 40 mg oral delayed release tablet: 1 orally once a day  simvastatin 20 mg oral tablet: 1 tab(s) orally once a day (at bedtime)  tamsulosin 0.4 mg oral capsule: 1 cap(s) orally once a day  triamcinolone 0.1% topical cream: Apply topically to affected area 2 times a day  Vitamin C 500 mg oral capsule: 1 orally once a day

## 2023-05-19 NOTE — PROGRESS NOTE ADULT - SUBJECTIVE AND OBJECTIVE BOX
Patient is a 89y old  Male who presents with a chief complaint of Lower GI bleed (18 May 2023 17:52)    PATIENT IS SEEN AND EXAMINED IN MEDICAL FLOOR.  NIKOLAS [    ]    IVETT [   ]      GT [   ]    ALLERGIES:  No Known Allergies      Daily     Daily     VITALS:    Vital Signs Last 24 Hrs  T(C): 36.4 (19 May 2023 05:37), Max: 36.7 (18 May 2023 13:45)  T(F): 97.5 (19 May 2023 05:37), Max: 98 (18 May 2023 13:45)  HR: 92 (19 May 2023 05:37) (92 - 105)  BP: 105/67 (19 May 2023 05:37) (105/67 - 146/77)  BP(mean): --  RR: 16 (19 May 2023 05:37) (16 - 20)  SpO2: 98% (19 May 2023 05:37) (95% - 98%)    Parameters below as of 19 May 2023 05:37  Patient On (Oxygen Delivery Method): room air        LABS:    CBC Full  -  ( 18 May 2023 08:40 )  WBC Count : 11.14 K/uL  RBC Count : 3.75 M/uL  Hemoglobin : 9.5 g/dL  Hematocrit : 31.7 %  Platelet Count - Automated : 373 K/uL  Mean Cell Volume : 84.5 fl  Mean Cell Hemoglobin : 25.3 pg  Mean Cell Hemoglobin Concentration : 30.0 gm/dL  Auto Neutrophil # : 9.38 K/uL  Auto Lymphocyte # : 0.85 K/uL  Auto Monocyte # : 0.62 K/uL  Auto Eosinophil # : 0.02 K/uL  Auto Basophil # : 0.03 K/uL  Auto Neutrophil % : 84.1 %  Auto Lymphocyte % : 7.6 %  Auto Monocyte % : 5.6 %  Auto Eosinophil % : 0.2 %  Auto Basophil % : 0.3 %      05-18    139  |  113<H>  |  8   ----------------------------<  75  4.1   |  18<L>  |  0.59    Ca    8.2<L>      18 May 2023 08:40  Phos  2.6     05-18  Mg     2.2     05-18    TPro  5.2<L>  /  Alb  1.1<L>  /  TBili  0.5  /  DBili  x   /  AST  17  /  ALT  10  /  AlkPhos  94  05-18    CAPILLARY BLOOD GLUCOSE            LIVER FUNCTIONS - ( 18 May 2023 08:40 )  Alb: 1.1 g/dL / Pro: 5.2 g/dL / ALK PHOS: 94 U/L / ALT: 10 U/L DA / AST: 17 U/L / GGT: x           Creatinine Trend: 0.59<--, 0.64<--, 0.82<--, 0.93<--  I&O's Summary    18 May 2023 07:01  -  19 May 2023 07:00  --------------------------------------------------------  IN: 0 mL / OUT: 100 mL / NET: -100 mL            Clean Catch Clean Catch (Midstream)   @ 05:27   >100,000 CFU/ml Escherichia coli  Multiple Morphological Strains  --  Escherichia coli  Escherichia coli      .Blood Blood-Peripheral  05-15 @ 22:25   No growth to date.  --  --      .Blood Blood-Peripheral  05-15 @ 22:10   No growth to date.  --  --      .Urine   @ 14:55   No growth  --  --      .Blood   @ 13:45   No Growth Final  --  --          MEDICATIONS:    MEDICATIONS  (STANDING):  ascorbic acid 500 milliGRAM(s) Oral daily  finasteride 5 milliGRAM(s) Oral daily  levothyroxine 25 MICROGram(s) Oral daily  morphine  - Injectable 1 milliGRAM(s) IV Push every 6 hours  pantoprazole  Injectable 40 milliGRAM(s) IV Push two times a day  piperacillin/tazobactam IVPB.. 3.375 Gram(s) IV Intermittent every 8 hours  simvastatin 20 milliGRAM(s) Oral at bedtime  sodium chloride 0.9%. 1000 milliLiter(s) (100 mL/Hr) IV Continuous <Continuous>  tamsulosin 0.4 milliGRAM(s) Oral at bedtime      MEDICATIONS  (PRN):      REVIEW OF SYSTEMS:                           ALL ROS DONE [ X   ]    CONSTITUTIONAL:  LETHARGIC [   ], FEVER [   ], UNRESPONSIVE [   ]  CVS:  CP  [   ], SOB, [   ], PALPITATIONS [   ], DIZZYNESS [   ]  RS: COUGH [   ], SPUTUM [   ]  GI: ABDOMINAL PAIN [   ], NAUSEA [   ], VOMITINGS [   ], DIARRHEA [   ], CONSTIPATION [   ]  :  DYSURIA [   ], NOCTURIA [   ], INCREASED FREQUENCY [   ], DRIBLING [   ],  SKELETAL: PAINFUL JOINTS [   ], SWOLLEN JOINTS [   ], NECK ACHE [   ], LOW BACK ACHE [   ],  SKIN : ULCERS [   ], RASH [   ], ITCHING [   ]  CNS: HEAD ACHE [   ], DOUBLE VISION [   ], BLURRED VISION [   ], AMS / CONFUSION [   ], SEIZURES [   ], WEAKNESS [   ],TINGLING / NUMBNESS [   ]      PHYSICAL EXAMINATION:  GENERAL APPEARANCE: NO DISTRESS  HEENT:  NO PALLOR, NO  JVD,  NO   NODES, NECK SUPPLE  CVS: S1 +, S2 +,   RS: AEEB,  OCCASIONAL  RALES +,   NO RONCHI  ABD: SOFT, NO, BS +  ; TTP OVER SUPRAPUBIC AREA+  EXT: NO PE  SKIN: WARM,   SKELETAL:  REDUCE ROM OF CERVICAL AND LUMBOSACRAL SPINE  CNS:  AAO X 1    RADIOLOGY :    ACC: [id number] EXAM:  CT ANGIO ABD PELV (W)AW IC   ORDERED BY: JACK HAWKINS     IMPRESSION:  Acute sigmoid diverticulitis with suggestion of a fistulous connection   with the urinary bladder as described above. There is marked wall   thickening of the redundant distal sigmoid colon. Ischemia and/or   underlying neoplasm should be considered. Please note that active GI   bleed cannot be excluded on this noncontrast study.    Markedly distended gallbladder. No calcified gallstones. Abdominal   ultrasound suggested for further evaluation.      ASSESSMENT :     Gastrointestinal hemorrhage    No pertinent past medical history    Hypothyroidism    HTN (hypertension)    HLD (hyperlipidemia)    Glaucoma    GERD (gastroesophageal reflux disease)    BPH (benign prostatic hyperplasia)    Prostate cancer    No significant past surgical history        PLAN:  HPI:  Patient is 89 years old male from [facility] with past medical history of hypertension, hyperlipidemia. glaucoma, GERD, BPH, hypothyroidism, and prostate cancer, baseline A&Ox1 who was transferred due to rectal bleeding. His BP was around 85/50. Patient also has elevated WBCs and elevated lactate. Patient was admitted in April with suspected bacteremia and dysphagia for which he had esophagogram that showed Mildly dilated proximal and midesophagus with mild narrowing of the distal esophagus with mild irregularity of esophageal wall. In ED, Patient received 1L IV bolus of NS. DDAVPx1 and started on PPI drip. Patient doesn't seem to have chest pain, abdominal pain or vomiting or shortness of breath.    REVIEW OF SYSTEMS:  Limited due to mental status      (16 May 2023 01:40)    # PROGNOSIS IS POOR.  # [] - CASE DISCUSSED AT LENGTH WITH MANA BALL @ [phone] [ OF MARIA INES BALL, PATIENT'S  ?COUSIN] - ALL QUESTIONS ANSWERED. HE EXPRESSED THAT PATIENT DOES NOT HAVE ANY OTHER RELATIVES OR FRIENDS WHO ARE INVOLVED IN HIS CARE. HE IS AGREEABLE TO SERVE AS PATIENT'S SURROGATE DECISION MAKER GIVEN THAT PATIENT DOES NOT HAVE MEDICAL CAPACITY FOR DECISION MAKING [GIVEN DEMENTIA].     D/W MR. BALL THAT PATIENT'S PROGNOSIS IS POOR GIVEN CURRENT CLINICAL CONDITION - SEPSIS S/T UNDERLYING ACUTE DIVERTICULITIS, UTI - COMPLICATED BY COLOVESICULAR FISTULA. DISCUSSED THAT PATIENT IS HIGH SURGICAL RISK AND THAT HE HAS HAD [PER REVIEW] DECLINING FUNCTIONAL STATUS. MR. BALL EXPRESSED UNDERSTANDING AND THAT PATIENT WOULD LIKELY HAVE WISHED TO BE KEPT COMFORTABLE IN THIS CIRCUMSTANCE AND WOULD DEFER AGGRESSIVE MEDICAL INTERVENTIONS. HE ADDRESSED THAT HE WISHES FOR GOC OF DNR/DNI/COMFORT CARE [MOLST FILLED] AND IS AGREEABLE FOR RETURNING TO [facility] WITH HOSPICE.    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  - NOTED CT A/P  - ZOSYN, F/U BCX AND UCX  - PRN PAIN CONTROL  - ID CONSULT  - SURGERY CONSULT  - GI CONSULT    # R/O GI BLEED  - S/P DDAVP  - PPI DRIP  - NPO  - TYPE AND SCREEN  - HOLD A/C  - GI CONSULT    # ELEVATED LACTIC ACID  - TREND LACTIC ACID    # DYSPHAGIA  - RESUME DIET AS TOLERATED, GIVEN GOC PER SDM  - S/P RECENT GI AND ST EVALUATIONS  - GI CONSULT    # ABNORMAL GALLBLADDER   - F/U GB U/S    # HYPOKALEMIA  - REPLETING WITH SUPPLEMENT    # AMBULATORY DYSFUNCTION S/T OA, OP  - F/U PT EVAL    # LIKELY SEVERE PROTEIN CALORIE MALNUTRITION  - NUTRITIONAL SUPPLEMENT    # HTN  # HLD  # GERD  # BPH, HX OF PROSTATE CA  # HYPOTHYROIDISM  # GLAUCOMA  # VASCULAR DEMENTIA  # GI AND DVT PPX.    Patient is a 89y old  Male who presents with a chief complaint of Lower GI bleed (18 May 2023 17:52)    PATIENT IS SEEN AND EXAMINED IN MEDICAL FLOOR.    ALLERGIES:  No Known Allergies      VITALS:    Vital Signs Last 24 Hrs  T(C): 36.4 (19 May 2023 05:37), Max: 36.7 (18 May 2023 13:45)  T(F): 97.5 (19 May 2023 05:37), Max: 98 (18 May 2023 13:45)  HR: 92 (19 May 2023 05:37) (92 - 105)  BP: 105/67 (19 May 2023 05:37) (105/67 - 146/77)  BP(mean): --  RR: 16 (19 May 2023 05:37) (16 - 20)  SpO2: 98% (19 May 2023 05:37) (95% - 98%)    Parameters below as of 19 May 2023 05:37  Patient On (Oxygen Delivery Method): room air        LABS:    CBC Full  -  ( 18 May 2023 08:40 )  WBC Count : 11.14 K/uL  RBC Count : 3.75 M/uL  Hemoglobin : 9.5 g/dL  Hematocrit : 31.7 %  Platelet Count - Automated : 373 K/uL  Mean Cell Volume : 84.5 fl  Mean Cell Hemoglobin : 25.3 pg  Mean Cell Hemoglobin Concentration : 30.0 gm/dL  Auto Neutrophil # : 9.38 K/uL  Auto Lymphocyte # : 0.85 K/uL  Auto Monocyte # : 0.62 K/uL  Auto Eosinophil # : 0.02 K/uL  Auto Basophil # : 0.03 K/uL  Auto Neutrophil % : 84.1 %  Auto Lymphocyte % : 7.6 %  Auto Monocyte % : 5.6 %  Auto Eosinophil % : 0.2 %  Auto Basophil % : 0.3 %      -18    139  |  113<H>  |  8   ----------------------------<  75  4.1   |  18<L>  |  0.59    Ca    8.2<L>      18 May 2023 08:40  Phos  2.6     05-18  Mg     2.2     05-18    TPro  5.2<L>  /  Alb  1.1<L>  /  TBili  0.5  /  DBili  x   /  AST  17  /  ALT  10  /  AlkPhos  94  05-18    CAPILLARY BLOOD GLUCOSE            LIVER FUNCTIONS - ( 18 May 2023 08:40 )  Alb: 1.1 g/dL / Pro: 5.2 g/dL / ALK PHOS: 94 U/L / ALT: 10 U/L DA / AST: 17 U/L / GGT: x           Creatinine Trend: 0.59<--, 0.64<--, 0.82<--, 0.93<--  I&O's Summary    18 May 2023 07:01  -  19 May 2023 07:00  --------------------------------------------------------  IN: 0 mL / OUT: 100 mL / NET: -100 mL            Clean Catch Clean Catch (Midstream)   @ 05:27   >100,000 CFU/ml Escherichia coli  Multiple Morphological Strains  --  Escherichia coli  Escherichia coli      .Blood Blood-Peripheral  05-15 @ 22:25   No growth to date.  --  --      .Blood Blood-Peripheral  05-15 @ 22:10   No growth to date.  --  --      .Urine   @ 14:55   No growth  --  --      .Blood   @ 13:45   No Growth Final  --  --          MEDICATIONS:    MEDICATIONS  (STANDING):  ascorbic acid 500 milliGRAM(s) Oral daily  finasteride 5 milliGRAM(s) Oral daily  levothyroxine 25 MICROGram(s) Oral daily  morphine  - Injectable 1 milliGRAM(s) IV Push every 6 hours  pantoprazole  Injectable 40 milliGRAM(s) IV Push two times a day  piperacillin/tazobactam IVPB.. 3.375 Gram(s) IV Intermittent every 8 hours  simvastatin 20 milliGRAM(s) Oral at bedtime  sodium chloride 0.9%. 1000 milliLiter(s) (100 mL/Hr) IV Continuous <Continuous>  tamsulosin 0.4 milliGRAM(s) Oral at bedtime      MEDICATIONS  (PRN):      REVIEW OF SYSTEMS:                           ALL ROS DONE [ X   ]    CONSTITUTIONAL:  LETHARGIC [   ], FEVER [   ], UNRESPONSIVE [   ]  CVS:  CP  [   ], SOB, [   ], PALPITATIONS [   ], DIZZYNESS [   ]  RS: COUGH [   ], SPUTUM [   ]  GI: ABDOMINAL PAIN [   ], NAUSEA [   ], VOMITINGS [   ], DIARRHEA [   ], CONSTIPATION [   ]  :  DYSURIA [   ], NOCTURIA [   ], INCREASED FREQUENCY [   ], DRIBLING [   ],  SKELETAL: PAINFUL JOINTS [   ], SWOLLEN JOINTS [   ], NECK ACHE [   ], LOW BACK ACHE [   ],  SKIN : ULCERS [   ], RASH [   ], ITCHING [   ]  CNS: HEAD ACHE [   ], DOUBLE VISION [   ], BLURRED VISION [   ], AMS / CONFUSION [   ], SEIZURES [   ], WEAKNESS [   ],TINGLING / NUMBNESS [   ]      PHYSICAL EXAMINATION:  GENERAL APPEARANCE: NO DISTRESS  HEENT:  NO PALLOR, NO  JVD,  NO   NODES, NECK SUPPLE  CVS: S1 +, S2 +,   RS: AEEB,  OCCASIONAL  RALES +,   NO RONCHI  ABD: SOFT, NO, BS +  ; TTP OVER SUPRAPUBIC AREA+  EXT: NO PE  SKIN: WARM,   SKELETAL:  REDUCE ROM OF CERVICAL AND LUMBOSACRAL SPINE  CNS:  AAO X 1    RADIOLOGY :    ACC: [id number] EXAM:  CT ANGIO ABD PELV (W)AW IC   ORDERED BY: AJCK HAWKINS     IMPRESSION:  Acute sigmoid diverticulitis with suggestion of a fistulous connection   with the urinary bladder as described above. There is marked wall   thickening of the redundant distal sigmoid colon. Ischemia and/or   underlying neoplasm should be considered. Please note that active GI   bleed cannot be excluded on this noncontrast study.    Markedly distended gallbladder. No calcified gallstones. Abdominal   ultrasound suggested for further evaluation.      ASSESSMENT :     Gastrointestinal hemorrhage    No pertinent past medical history    Hypothyroidism    HTN (hypertension)    HLD (hyperlipidemia)    Glaucoma    GERD (gastroesophageal reflux disease)    BPH (benign prostatic hyperplasia)    Prostate cancer    No significant past surgical history        PLAN:  HPI:  Patient is 89 years old male from [facility] with past medical history of hypertension, hyperlipidemia. glaucoma, GERD, BPH, hypothyroidism, and prostate cancer, baseline A&Ox1 who was transferred due to rectal bleeding. His BP was around 85/50. Patient also has elevated WBCs and elevated lactate. Patient was admitted in April with suspected bacteremia and dysphagia for which he had esophagogram that showed Mildly dilated proximal and midesophagus with mild narrowing of the distal esophagus with mild irregularity of esophageal wall. In ED, Patient received 1L IV bolus of NS. DDAVPx1 and started on PPI drip. Patient doesn't seem to have chest pain, abdominal pain or vomiting or shortness of breath.    REVIEW OF SYSTEMS:  Limited due to mental status      (16 May 2023 01:40)    # PROGNOSIS IS POOR.  # [] - CASE DISCUSSED AT LENGTH WITH MANA BALL @ [phone] [ OF MARIA INES BALL, PATIENT'S  ?COUSIN] - ALL QUESTIONS ANSWERED. HE EXPRESSED THAT PATIENT DOES NOT HAVE ANY OTHER RELATIVES OR FRIENDS WHO ARE INVOLVED IN HIS CARE. HE IS AGREEABLE TO SERVE AS PATIENT'S SURROGATE DECISION MAKER GIVEN THAT PATIENT DOES NOT HAVE MEDICAL CAPACITY FOR DECISION MAKING [GIVEN DEMENTIA].     D/W MR. BALL THAT PATIENT'S PROGNOSIS IS POOR GIVEN CURRENT CLINICAL CONDITION - SEPSIS S/T UNDERLYING ACUTE DIVERTICULITIS, UTI - COMPLICATED BY COLOVESICULAR FISTULA. DISCUSSED THAT PATIENT IS HIGH SURGICAL RISK AND THAT HE HAS HAD [PER REVIEW] DECLINING FUNCTIONAL STATUS. MR. BALL EXPRESSED UNDERSTANDING AND THAT PATIENT WOULD LIKELY HAVE WISHED TO BE KEPT COMFORTABLE IN THIS CIRCUMSTANCE AND WOULD DEFER AGGRESSIVE MEDICAL INTERVENTIONS AND EVALUATIONS. HE ADDRESSED THAT HE WISHES FOR GOC OF DNR/DNI/COMFORT CARE [MOLST FILLED] AND IS AGREEABLE FOR RETURNING TO [facility] WITH HOSPICE.    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  - NOTED CT A/P  - ZOSYN, F/U BCX AND UCX  - PRN PAIN CONTROL  - ID CONSULT  - SURGERY CONSULT  - GI CONSULT    - ROOT PLACED BY UROLOGY, F/U BLADDER U/S    # R/O GI BLEED  - S/P DDAVP  - PPI DRIP  - NPO  - TYPE AND SCREEN  - HOLD A/C  - GI CONSULT    # ELEVATED LACTIC ACID  - TREND LACTIC ACID    # DYSPHAGIA  - RESUME DIET AS TOLERATED, GIVEN GOC PER SDM  - S/P RECENT GI AND ST EVALUATIONS  - GI CONSULT    # ABNORMAL GALLBLADDER   - F/U GB U/S    # HYPOKALEMIA  - REPLETING WITH SUPPLEMENT    # AMBULATORY DYSFUNCTION S/T OA, OP  - F/U PT EVAL    # LIKELY SEVERE PROTEIN CALORIE MALNUTRITION  - NUTRITIONAL SUPPLEMENT    # HTN  # HLD  # GERD  # BPH, HX OF PROSTATE CA  # HYPOTHYROIDISM  # GLAUCOMA  # VASCULAR DEMENTIA  # GI AND DVT PPX.    Patient is a 89y old  Male who presents with a chief complaint of Lower GI bleed (18 May 2023 17:52)    PATIENT IS SEEN AND EXAMINED IN MEDICAL FLOOR.    ALLERGIES:  No Known Allergies      VITALS:    Vital Signs Last 24 Hrs  T(C): 36.4 (19 May 2023 05:37), Max: 36.7 (18 May 2023 13:45)  T(F): 97.5 (19 May 2023 05:37), Max: 98 (18 May 2023 13:45)  HR: 92 (19 May 2023 05:37) (92 - 105)  BP: 105/67 (19 May 2023 05:37) (105/67 - 146/77)  BP(mean): --  RR: 16 (19 May 2023 05:37) (16 - 20)  SpO2: 98% (19 May 2023 05:37) (95% - 98%)    Parameters below as of 19 May 2023 05:37  Patient On (Oxygen Delivery Method): room air        LABS:    CBC Full  -  ( 18 May 2023 08:40 )  WBC Count : 11.14 K/uL  RBC Count : 3.75 M/uL  Hemoglobin : 9.5 g/dL  Hematocrit : 31.7 %  Platelet Count - Automated : 373 K/uL  Mean Cell Volume : 84.5 fl  Mean Cell Hemoglobin : 25.3 pg  Mean Cell Hemoglobin Concentration : 30.0 gm/dL  Auto Neutrophil # : 9.38 K/uL  Auto Lymphocyte # : 0.85 K/uL  Auto Monocyte # : 0.62 K/uL  Auto Eosinophil # : 0.02 K/uL  Auto Basophil # : 0.03 K/uL  Auto Neutrophil % : 84.1 %  Auto Lymphocyte % : 7.6 %  Auto Monocyte % : 5.6 %  Auto Eosinophil % : 0.2 %  Auto Basophil % : 0.3 %      -18    139  |  113<H>  |  8   ----------------------------<  75  4.1   |  18<L>  |  0.59    Ca    8.2<L>      18 May 2023 08:40  Phos  2.6     05-18  Mg     2.2     05-18    TPro  5.2<L>  /  Alb  1.1<L>  /  TBili  0.5  /  DBili  x   /  AST  17  /  ALT  10  /  AlkPhos  94  05-18    CAPILLARY BLOOD GLUCOSE            LIVER FUNCTIONS - ( 18 May 2023 08:40 )  Alb: 1.1 g/dL / Pro: 5.2 g/dL / ALK PHOS: 94 U/L / ALT: 10 U/L DA / AST: 17 U/L / GGT: x           Creatinine Trend: 0.59<--, 0.64<--, 0.82<--, 0.93<--  I&O's Summary    18 May 2023 07:01  -  19 May 2023 07:00  --------------------------------------------------------  IN: 0 mL / OUT: 100 mL / NET: -100 mL            Clean Catch Clean Catch (Midstream)   @ 05:27   >100,000 CFU/ml Escherichia coli  Multiple Morphological Strains  --  Escherichia coli  Escherichia coli      .Blood Blood-Peripheral  05-15 @ 22:25   No growth to date.  --  --      .Blood Blood-Peripheral  05-15 @ 22:10   No growth to date.  --  --      .Urine   @ 14:55   No growth  --  --      .Blood   @ 13:45   No Growth Final  --  --          MEDICATIONS:    MEDICATIONS  (STANDING):  ascorbic acid 500 milliGRAM(s) Oral daily  finasteride 5 milliGRAM(s) Oral daily  levothyroxine 25 MICROGram(s) Oral daily  morphine  - Injectable 1 milliGRAM(s) IV Push every 6 hours  pantoprazole  Injectable 40 milliGRAM(s) IV Push two times a day  piperacillin/tazobactam IVPB.. 3.375 Gram(s) IV Intermittent every 8 hours  simvastatin 20 milliGRAM(s) Oral at bedtime  sodium chloride 0.9%. 1000 milliLiter(s) (100 mL/Hr) IV Continuous <Continuous>  tamsulosin 0.4 milliGRAM(s) Oral at bedtime      MEDICATIONS  (PRN):      REVIEW OF SYSTEMS:                           ALL ROS DONE [ X   ]    CONSTITUTIONAL:  LETHARGIC [   ], FEVER [   ], UNRESPONSIVE [   ]  CVS:  CP  [   ], SOB, [   ], PALPITATIONS [   ], DIZZYNESS [   ]  RS: COUGH [   ], SPUTUM [   ]  GI: ABDOMINAL PAIN [   ], NAUSEA [   ], VOMITINGS [   ], DIARRHEA [   ], CONSTIPATION [   ]  :  DYSURIA [   ], NOCTURIA [   ], INCREASED FREQUENCY [   ], DRIBLING [   ],  SKELETAL: PAINFUL JOINTS [   ], SWOLLEN JOINTS [   ], NECK ACHE [   ], LOW BACK ACHE [   ],  SKIN : ULCERS [   ], RASH [   ], ITCHING [   ]  CNS: HEAD ACHE [   ], DOUBLE VISION [   ], BLURRED VISION [   ], AMS / CONFUSION [   ], SEIZURES [   ], WEAKNESS [   ],TINGLING / NUMBNESS [   ]      PHYSICAL EXAMINATION:  GENERAL APPEARANCE: NO DISTRESS  HEENT:  NO PALLOR, NO  JVD,  NO   NODES, NECK SUPPLE  CVS: S1 +, S2 +,   RS: AEEB,  OCCASIONAL  RALES +,   NO RONCHI  ABD: SOFT, NO, BS +  ; TTP OVER SUPRAPUBIC AREA +  EXT: NO PE  SKIN: WARM,   SKELETAL:  REDUCE ROM OF CERVICAL AND LUMBOSACRAL SPINE  CNS:  AAO X 1    RADIOLOGY :    ACC: [id number] EXAM:  CT ANGIO ABD PELV (W)AW IC   ORDERED BY: JACK HAWKINS     IMPRESSION:  Acute sigmoid diverticulitis with suggestion of a fistulous connection   with the urinary bladder as described above. There is marked wall   thickening of the redundant distal sigmoid colon. Ischemia and/or   underlying neoplasm should be considered. Please note that active GI   bleed cannot be excluded on this noncontrast study.    Markedly distended gallbladder. No calcified gallstones. Abdominal   ultrasound suggested for further evaluation.      ASSESSMENT :     Gastrointestinal hemorrhage    No pertinent past medical history    Hypothyroidism    HTN (hypertension)    HLD (hyperlipidemia)    Glaucoma    GERD (gastroesophageal reflux disease)    BPH (benign prostatic hyperplasia)    Prostate cancer    No significant past surgical history        PLAN:  HPI:  Patient is 89 years old male from [facility] with past medical history of hypertension, hyperlipidemia. glaucoma, GERD, BPH, hypothyroidism, and prostate cancer, baseline A&Ox1 who was transferred due to rectal bleeding. His BP was around 85/50. Patient also has elevated WBCs and elevated lactate. Patient was admitted in April with suspected bacteremia and dysphagia for which he had esophagogram that showed Mildly dilated proximal and midesophagus with mild narrowing of the distal esophagus with mild irregularity of esophageal wall. In ED, Patient received 1L IV bolus of NS. DDAVPx1 and started on PPI drip. Patient doesn't seem to have chest pain, abdominal pain or vomiting or shortness of breath.    REVIEW OF SYSTEMS:  Limited due to mental status      (16 May 2023 01:40)    # PROGNOSIS IS POOR.  # [] - CASE DISCUSSED AT LENGTH WITH MANA BALL @ [phone] [ OF MARIA INES BALL, PATIENT'S  ?COUSIN] - ALL QUESTIONS ANSWERED. HE EXPRESSED THAT PATIENT DOES NOT HAVE ANY OTHER RELATIVES OR FRIENDS WHO ARE INVOLVED IN HIS CARE. HE IS AGREEABLE TO SERVE AS PATIENT'S SURROGATE DECISION MAKER GIVEN THAT PATIENT DOES NOT HAVE MEDICAL CAPACITY FOR DECISION MAKING [GIVEN DEMENTIA].     D/W MR. BALL THAT PATIENT'S PROGNOSIS IS POOR GIVEN CURRENT CLINICAL CONDITION - SEPSIS S/T UNDERLYING ACUTE DIVERTICULITIS, UTI - COMPLICATED BY COLOVESICULAR FISTULA. DISCUSSED THAT PATIENT IS HIGH SURGICAL RISK AND THAT HE HAS HAD [PER REVIEW] DECLINING FUNCTIONAL STATUS. MR. BALL EXPRESSED UNDERSTANDING AND THAT PATIENT WOULD LIKELY HAVE WISHED TO BE KEPT COMFORTABLE IN THIS CIRCUMSTANCE AND WOULD DEFER AGGRESSIVE MEDICAL INTERVENTIONS AND EVALUATIONS. HE ADDRESSED THAT HE WISHES FOR GOC OF DNR/DNI/COMFORT CARE [MOLST FILLED] AND IS AGREEABLE FOR RETURNING TO [facility] WITH HOSPICE.    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  - NOTED CT A/P  - ZOSYN, F/U BCX [ NGTD ] AND UCX - ECOLI  - PRN PAIN CONTROL  - ID CONSULT  - SURGERY CONSULT  - GI CONSULT    - ROOT PLACED BY UROLOGY, F/U BLADDER U/S    # R/O GI BLEED  - S/P DDAVP  - PPI DRIP  - NPO  - TYPE AND SCREEN  - HOLD A/C  - GI CONSULT    # ELEVATED LACTIC ACID  - TREND LACTIC ACID    # DYSPHAGIA  - RESUME DIET AS TOLERATED, GIVEN GOC PER SDM  - S/P RECENT GI AND ST EVALUATIONS  - GI CONSULT    # ABNORMAL GALLBLADDER   - F/U GB U/S    # HYPOKALEMIA  - REPLETING WITH SUPPLEMENT    # AMBULATORY DYSFUNCTION S/T OA, OP  - F/U PT EVAL    # LIKELY SEVERE PROTEIN CALORIE MALNUTRITION  - NUTRITIONAL SUPPLEMENT    # HTN  # HLD  # GERD  # BPH, HX OF PROSTATE CA  # HYPOTHYROIDISM  # GLAUCOMA  # VASCULAR DEMENTIA  # GI AND DVT PPX.

## 2023-05-20 ENCOUNTER — TRANSCRIPTION ENCOUNTER (OUTPATIENT)
Age: 88
End: 2023-05-20

## 2023-05-20 VITALS
SYSTOLIC BLOOD PRESSURE: 119 MMHG | OXYGEN SATURATION: 98 % | DIASTOLIC BLOOD PRESSURE: 60 MMHG | HEART RATE: 95 BPM | TEMPERATURE: 99 F

## 2023-05-20 PROCEDURE — 87635 SARS-COV-2 COVID-19 AMP PRB: CPT

## 2023-05-20 PROCEDURE — 86900 BLOOD TYPING SEROLOGIC ABO: CPT

## 2023-05-20 PROCEDURE — 85730 THROMBOPLASTIN TIME PARTIAL: CPT

## 2023-05-20 PROCEDURE — 83735 ASSAY OF MAGNESIUM: CPT

## 2023-05-20 PROCEDURE — 83935 ASSAY OF URINE OSMOLALITY: CPT

## 2023-05-20 PROCEDURE — 87077 CULTURE AEROBIC IDENTIFY: CPT

## 2023-05-20 PROCEDURE — 82570 ASSAY OF URINE CREATININE: CPT

## 2023-05-20 PROCEDURE — 36415 COLL VENOUS BLD VENIPUNCTURE: CPT

## 2023-05-20 PROCEDURE — 83605 ASSAY OF LACTIC ACID: CPT

## 2023-05-20 PROCEDURE — 85027 COMPLETE CBC AUTOMATED: CPT

## 2023-05-20 PROCEDURE — 71045 X-RAY EXAM CHEST 1 VIEW: CPT

## 2023-05-20 PROCEDURE — 93005 ELECTROCARDIOGRAM TRACING: CPT

## 2023-05-20 PROCEDURE — 96365 THER/PROPH/DIAG IV INF INIT: CPT

## 2023-05-20 PROCEDURE — 82962 GLUCOSE BLOOD TEST: CPT

## 2023-05-20 PROCEDURE — 81001 URINALYSIS AUTO W/SCOPE: CPT

## 2023-05-20 PROCEDURE — 99285 EMERGENCY DEPT VISIT HI MDM: CPT

## 2023-05-20 PROCEDURE — 84484 ASSAY OF TROPONIN QUANT: CPT

## 2023-05-20 PROCEDURE — 87186 SC STD MICRODIL/AGAR DIL: CPT

## 2023-05-20 PROCEDURE — 84156 ASSAY OF PROTEIN URINE: CPT

## 2023-05-20 PROCEDURE — 84300 ASSAY OF URINE SODIUM: CPT

## 2023-05-20 PROCEDURE — 74174 CTA ABD&PLVS W/CONTRAST: CPT | Mod: MA

## 2023-05-20 PROCEDURE — 85610 PROTHROMBIN TIME: CPT

## 2023-05-20 PROCEDURE — 86901 BLOOD TYPING SEROLOGIC RH(D): CPT

## 2023-05-20 PROCEDURE — 86850 RBC ANTIBODY SCREEN: CPT

## 2023-05-20 PROCEDURE — 76857 US EXAM PELVIC LIMITED: CPT

## 2023-05-20 PROCEDURE — 83690 ASSAY OF LIPASE: CPT

## 2023-05-20 PROCEDURE — 84100 ASSAY OF PHOSPHORUS: CPT

## 2023-05-20 PROCEDURE — 80053 COMPREHEN METABOLIC PANEL: CPT

## 2023-05-20 PROCEDURE — 84540 ASSAY OF URINE/UREA-N: CPT

## 2023-05-20 PROCEDURE — 87040 BLOOD CULTURE FOR BACTERIA: CPT

## 2023-05-20 PROCEDURE — 87086 URINE CULTURE/COLONY COUNT: CPT

## 2023-05-20 PROCEDURE — 96366 THER/PROPH/DIAG IV INF ADDON: CPT

## 2023-05-20 PROCEDURE — 84133 ASSAY OF URINE POTASSIUM: CPT

## 2023-05-20 PROCEDURE — 85025 COMPLETE CBC W/AUTO DIFF WBC: CPT

## 2023-05-20 RX ORDER — MORPHINE SULFATE 50 MG/1
1 CAPSULE, EXTENDED RELEASE ORAL
Qty: 0 | Refills: 0 | DISCHARGE
Start: 2023-05-20

## 2023-05-20 RX ADMIN — Medication 100 MILLIGRAM(S): at 13:01

## 2023-05-20 RX ADMIN — PANTOPRAZOLE SODIUM 40 MILLIGRAM(S): 20 TABLET, DELAYED RELEASE ORAL at 05:33

## 2023-05-20 RX ADMIN — MORPHINE SULFATE 1 MILLIGRAM(S): 50 CAPSULE, EXTENDED RELEASE ORAL at 00:50

## 2023-05-20 RX ADMIN — CEFEPIME 100 MILLIGRAM(S): 1 INJECTION, POWDER, FOR SOLUTION INTRAMUSCULAR; INTRAVENOUS at 05:32

## 2023-05-20 RX ADMIN — Medication 100 MILLIGRAM(S): at 05:32

## 2023-05-20 RX ADMIN — MORPHINE SULFATE 1 MILLIGRAM(S): 50 CAPSULE, EXTENDED RELEASE ORAL at 12:59

## 2023-05-20 RX ADMIN — MORPHINE SULFATE 1 MILLIGRAM(S): 50 CAPSULE, EXTENDED RELEASE ORAL at 12:26

## 2023-05-20 RX ADMIN — Medication 500 MILLIGRAM(S): at 12:28

## 2023-05-20 RX ADMIN — FINASTERIDE 5 MILLIGRAM(S): 5 TABLET, FILM COATED ORAL at 12:27

## 2023-05-20 RX ADMIN — MORPHINE SULFATE 1 MILLIGRAM(S): 50 CAPSULE, EXTENDED RELEASE ORAL at 00:21

## 2023-05-20 RX ADMIN — MORPHINE SULFATE 1 MILLIGRAM(S): 50 CAPSULE, EXTENDED RELEASE ORAL at 05:35

## 2023-05-20 RX ADMIN — MORPHINE SULFATE 1 MILLIGRAM(S): 50 CAPSULE, EXTENDED RELEASE ORAL at 06:00

## 2023-05-20 RX ADMIN — Medication 25 MICROGRAM(S): at 05:33

## 2023-05-20 NOTE — DISCHARGE NOTE NURSING/CASE MANAGEMENT/SOCIAL WORK - NSDCVIVACCINE_GEN_ALL_CORE_FT
Td (adult) preservative free; 20-Oct-2021 19:29; Rosalva Gee (RN); [employer]; [id number] (Exp. Date: 09-Sep-2023); IntraMuscular; Deltoid Left.; 0.5 milliLiter(s); VIS (VIS Published: 20-Oct-2021, VIS Presented: 20-Oct-2021);

## 2023-05-20 NOTE — DISCHARGE NOTE NURSING/CASE MANAGEMENT/SOCIAL WORK - NSDCPEFALRISK_GEN_ALL_CORE
For information on Fall & Injury Prevention, visit: [email or web address] OR  [email or web address] OR  [email or web address]://www.cdc.gov/steadi/patient.html

## 2023-05-20 NOTE — DISCHARGE NOTE NURSING/CASE MANAGEMENT/SOCIAL WORK - PATIENT PORTAL LINK FT
You can access the FollowMyHealth Patient Portal offered by [facility] by registering at the following website: [email or web address]. By joining [facility]’s FollowMyHealth portal, you will also be able to view your health information using other applications (apps) compatible with our system.

## 2023-05-21 LAB
CULTURE RESULTS: SIGNIFICANT CHANGE UP
CULTURE RESULTS: SIGNIFICANT CHANGE UP
SPECIMEN SOURCE: SIGNIFICANT CHANGE UP
SPECIMEN SOURCE: SIGNIFICANT CHANGE UP

## 2023-11-03 NOTE — ED ADULT NURSE NOTE - PRO INTERPRETER NEED 2
English Bed/Stretcher in lowest position, wheels locked, appropriate side rails in place/Call bell, personal items and telephone in reach/Instruct patient to call for assistance before getting out of bed/chair/stretcher/Non-slip footwear applied when patient is off stretcher/[place] to call system/Physically safe environment - no spills, clutter or unnecessary equipment/Purposeful proactive rounding/Room/bathroom lighting operational, light cord in reach

## 2024-01-31 NOTE — ED PROVIDER NOTE - SCRIBE NAME
Debridement Wound Care        Problem List Items Addressed This Visit          Endocrine    Diabetic foot infection (HCC)    Charcot foot due to diabetes mellitus (HCC) - Primary   Was on the feet a lot this past week due to daughters wedding     Completely dehisced closure failed attempt to close the wound with suturing     Procedure Note  Indications:  Based on my examination of this patient's wound(s)/ulcer(s) today, debridement is  required to promote healing and evaluate the wound base.    Performed by: Celso Castaneda DPM    Consent obtained: Yes    Time out taken: Yes    Debridement: excisional    Using tissue nippers the wound(s)/ulcer(s) was/were sharply debrided down through and including the removal of    epidermis, dermis, subcutaneous tissue, and muscle/fascia    Devitalized Tissue Debrided: fibrin, biofilm, and slough    Pre Debridement Measurements:  Are located in the Wound/Ulcer Documentation Flow Sheet    Other non healing post op ulcer and diabetic     Wound/Ulcer #: 1    Post Debridement Measurements:  Wound/Ulcer Descriptions are Pre Debridement except measurements:    Wound Care Documentation:  Negative Pressure Wound Therapy Foot Right;Plantar (Active)   $ Standard NPWT >50 sq cm PER TX $ Yes 01/09/24 1621   Wound Type Diabetic foot ulcer 01/30/24 1535   Unit Type ACT VAC 01/30/24 1535   Dressing Type Black Foam;Other (Comment) 01/30/24 1535   Number of pieces used 2 01/30/24 1535   Number of pieces removed 1 01/30/24 1535   Cycle Continuous 01/30/24 1535   Target Pressure (mmHg) 150 01/30/24 1535   Intensity 5 01/30/24 1535   Canister changed? No 01/22/24 1045   Dressing Status New dressing applied;Clean, dry & intact 01/22/24 1045   Dressing Changed Changed/New 01/22/24 1045   Drainage Amount Copious 01/22/24 1045   Drainage Description Serosanguinous 01/22/24 1045   Wound Assessment Bleeding;Granulation tissue 01/22/24 1045   Allyn-wound Assessment Blanchable erythema;Fragile 01/22/24 
Perlita Cabrera (Scribe)
5

## 2024-05-24 NOTE — PROVIDER CONTACT NOTE (CRITICAL VALUE NOTIFICATION) - TEST AND RESULT REPORTED:
24-May-2024 16:06 Urine C&S final > 100,000 ESBL E. Coli  Blood C&S x 2:  Growth in Anerobic bottle ESBL E.Coli and growth in aerobic bottle gram variable rods.

## 2024-06-05 NOTE — ED ADULT TRIAGE NOTE - BMI (KG/M2)
25.1 Quality 130: Documentation Of Current Medications In The Medical Record: Current Medications Documented Detail Level: Detailed Quality 402: Tobacco Use And Help With Quitting Among Adolescents: Patient screened for tobacco and never smoked

## 2024-12-19 NOTE — H&P ADULT - PROBLEM SELECTOR PLAN 6
IMPROVE VTE Individual Risk Assessment  RISK                                                                Points  [  ] Previous VTE                                                  3  [  ] Thrombophilia                                               2  [  ] Lower limb paralysis                                      2        (unable to hold up >15 seconds)    [  ] Current Cancer                                              2         (within 6 months)  [x  ] Immobilization > 24 hrs                                1  [  ] ICU/CCU stay > 24 hours                              1  [x  ] Age > 60                                                      1  IMPROVE VTE Score _________2, lovenox for DVT proph
117.9